# Patient Record
Sex: MALE | Race: WHITE | NOT HISPANIC OR LATINO | Employment: FULL TIME | URBAN - METROPOLITAN AREA
[De-identification: names, ages, dates, MRNs, and addresses within clinical notes are randomized per-mention and may not be internally consistent; named-entity substitution may affect disease eponyms.]

---

## 2017-01-01 ENCOUNTER — GENERIC CONVERSION - ENCOUNTER (OUTPATIENT)
Dept: OTHER | Facility: OTHER | Age: 52
End: 2017-01-01

## 2017-02-14 ENCOUNTER — ALLSCRIPTS OFFICE VISIT (OUTPATIENT)
Dept: OTHER | Facility: OTHER | Age: 52
End: 2017-02-14

## 2017-05-14 DIAGNOSIS — R94.5 ABNORMAL RESULTS OF LIVER FUNCTION STUDIES: ICD-10-CM

## 2017-05-14 DIAGNOSIS — E78.5 HYPERLIPIDEMIA: ICD-10-CM

## 2017-08-14 DIAGNOSIS — I10 ESSENTIAL (PRIMARY) HYPERTENSION: ICD-10-CM

## 2017-08-14 DIAGNOSIS — K44.9 DIAPHRAGMATIC HERNIA WITHOUT OBSTRUCTION OR GANGRENE: ICD-10-CM

## 2017-08-14 DIAGNOSIS — R94.5 ABNORMAL RESULTS OF LIVER FUNCTION STUDIES: ICD-10-CM

## 2017-08-14 DIAGNOSIS — I25.10 ATHEROSCLEROTIC HEART DISEASE OF NATIVE CORONARY ARTERY WITHOUT ANGINA PECTORIS: ICD-10-CM

## 2017-08-14 DIAGNOSIS — F10.20 UNCOMPLICATED ALCOHOL DEPENDENCE (HCC): ICD-10-CM

## 2017-08-14 DIAGNOSIS — E78.5 HYPERLIPIDEMIA: ICD-10-CM

## 2017-08-14 DIAGNOSIS — R73.9 HYPERGLYCEMIA: ICD-10-CM

## 2017-10-11 ENCOUNTER — TRANSCRIBE ORDERS (OUTPATIENT)
Dept: LAB | Facility: CLINIC | Age: 52
End: 2017-10-11

## 2017-10-11 ENCOUNTER — APPOINTMENT (OUTPATIENT)
Dept: LAB | Facility: CLINIC | Age: 52
End: 2017-10-11
Payer: COMMERCIAL

## 2017-10-11 DIAGNOSIS — I25.10 ATHEROSCLEROTIC HEART DISEASE OF NATIVE CORONARY ARTERY WITHOUT ANGINA PECTORIS: ICD-10-CM

## 2017-10-11 DIAGNOSIS — R73.9 HYPERGLYCEMIA: ICD-10-CM

## 2017-10-11 DIAGNOSIS — I10 ESSENTIAL (PRIMARY) HYPERTENSION: ICD-10-CM

## 2017-10-11 DIAGNOSIS — F10.20 UNCOMPLICATED ALCOHOL DEPENDENCE (HCC): ICD-10-CM

## 2017-10-11 DIAGNOSIS — E78.5 HYPERLIPIDEMIA: ICD-10-CM

## 2017-10-11 DIAGNOSIS — K44.9 DIAPHRAGMATIC HERNIA WITHOUT OBSTRUCTION OR GANGRENE: ICD-10-CM

## 2017-10-11 DIAGNOSIS — R94.5 ABNORMAL RESULTS OF LIVER FUNCTION STUDIES: ICD-10-CM

## 2017-10-11 LAB
ALBUMIN SERPL BCP-MCNC: 4 G/DL (ref 3.5–5)
ALP SERPL-CCNC: 109 U/L (ref 46–116)
ALT SERPL W P-5'-P-CCNC: 66 U/L (ref 12–78)
ANION GAP SERPL CALCULATED.3IONS-SCNC: 7 MMOL/L (ref 4–13)
AST SERPL W P-5'-P-CCNC: 53 U/L (ref 5–45)
BILIRUB SERPL-MCNC: 0.73 MG/DL (ref 0.2–1)
BUN SERPL-MCNC: 19 MG/DL (ref 5–25)
CALCIUM SERPL-MCNC: 9.3 MG/DL (ref 8.3–10.1)
CHLORIDE SERPL-SCNC: 104 MMOL/L (ref 100–108)
CHOLEST SERPL-MCNC: 160 MG/DL (ref 50–200)
CO2 SERPL-SCNC: 29 MMOL/L (ref 21–32)
CREAT SERPL-MCNC: 1.01 MG/DL (ref 0.6–1.3)
EST. AVERAGE GLUCOSE BLD GHB EST-MCNC: 105 MG/DL
GFR SERPL CREATININE-BSD FRML MDRD: 86 ML/MIN/1.73SQ M
GLUCOSE P FAST SERPL-MCNC: 99 MG/DL (ref 65–99)
HBA1C MFR BLD: 5.3 % (ref 4.2–6.3)
HDLC SERPL-MCNC: 62 MG/DL (ref 40–60)
LDLC SERPL CALC-MCNC: 63 MG/DL (ref 0–100)
POTASSIUM SERPL-SCNC: 4.1 MMOL/L (ref 3.5–5.3)
PROT SERPL-MCNC: 8.8 G/DL (ref 6.4–8.2)
SODIUM SERPL-SCNC: 140 MMOL/L (ref 136–145)
TRIGL SERPL-MCNC: 174 MG/DL

## 2017-10-11 PROCEDURE — 83036 HEMOGLOBIN GLYCOSYLATED A1C: CPT

## 2017-10-11 PROCEDURE — 80053 COMPREHEN METABOLIC PANEL: CPT

## 2017-10-11 PROCEDURE — 36415 COLL VENOUS BLD VENIPUNCTURE: CPT

## 2017-10-11 PROCEDURE — 80061 LIPID PANEL: CPT

## 2017-10-19 ENCOUNTER — ALLSCRIPTS OFFICE VISIT (OUTPATIENT)
Dept: OTHER | Facility: OTHER | Age: 52
End: 2017-10-19

## 2017-10-20 NOTE — PROGRESS NOTES
Assessment  1  Hypertension (401 9) (I10)   2  Hyperlipidemia (272 4) (E78 5)   3  Elevated LFTs (790 6) (R79 89)   4  Alcoholism (303 90) (F10 20)   5  Hiatal hernia (553 3) (K44 9)   6  Hyperglycemia (790 29) (R73 9)   7  Atherosclerotic heart disease of native coronary artery without angina pectoris (414 01)   (I25 10)   8  Abdominal bloating (787 3) (R14 0)    Plan  Abdominal bloating    · 1 - Nick CHAPARRO, Michael Machuca (Gastroenterology) Co-Management  *  Status: Active  Requested  for: 79CGS7536  Care Summary provided  : Yes  Alcoholism, Atherosclerotic heart disease of native coronary artery without angina  pectoris, Elevated LFTs, Hiatal hernia, Hyperglycemia, Hyperlipidemia, Hypertension    · Follow-up visit in 6 months Evaluation and Treatment  Follow-up  Status: Hold For -  Scheduling  Requested for: 68CUR3772   · (1) COMPREHENSIVE METABOLIC PANEL; Status:Active; Requested for:19Apr2018;    · (1) HEMOGLOBIN A1C; Status:Active; Requested for:19Apr2018;    · (1) LIPID PANEL FASTING W DIRECT LDL REFLEX; Status:Active; Requested  ZPA:[id number]; Alcoholism, Atherosclerotic heart disease of native coronary artery without angina  pectoris, Elevated LFTs, Hiatal hernia, Hyperglycemia, Hyperlipidemia, Hypertension,  Prostate cancer screening    · (1) PSA (SCREEN) (Dx V76 44 Screen for Prostate Cancer); Status:Active; Requested  IVS:65TUX2857; Discussion/Summary    1  CAD with stent placement 3 years ago-continue with Cardiology  Hypertension- stable  Hyperlipidemia- LDL stable at 63 triglycerides improved to 174  Continue current medication recheck 6 months  Hyperglycemia- stable with a fasting sugar of 99 hemoglobin A1c of 5  3  Hiatal hernia/GERD- pt  no longer with heartburn but c/o bloating on a daily basis  Alcoholism-patient encouraged to decrease and/or quit alcohol consumption again  Elevated LFTs and fatty liver- LFT 66 and 53 with AST in a LT respectively today  Recheck 6 months  abdominal bloating- U/S, EGD and colonoscopy all WNL  Janae Sinks Janae Sinks Janae Sinks Pt again really needs to be referred back to  GI for further eval       Chief Complaint  Follow up to review blood work  bloating x several months  declined flu vaccine  History of Present Illness  Patient here today for chronic condition including CAD, hypertension, hyperlipidemia, hyperglycemia, hiatal hernia GERD IBS alcoholism and LFTs with fatty liver  Patient did have blood work done and his LDL is now down to 63 from 01/21 triglycerides down to 174 from 301  A1c 5 3 fasting sugar 99 LFTs are slightly elevated at 66 and 53 respectively  Blood pressure today is 154/80  Patient states he is taking all medications as directed without complaints of side effects  He still has problems with a bloating feeling in his upper abdomen occurs almost on a daily basis  He states that he is regular with his bowel movements without constipation or loose stools than usual  He is status post GI eval with [address] [place] [address] Dr abdi rasmussen including a EGD colonoscopy  He does take his PPI only on an occasional basis and states his heartburn is very well controlled otherwise  He tried probiotics at the suggestion of GI and this did not help so we stopped  His girlfriend accompanies him today  He continues to drink alcohol on a regular basis  Review of Systems    Constitutional: No fever or chills, feels well, no tiredness, no recent weight gain or weight loss  Eyes: No complaints of eye pain, no red eyes, no discharge from eyes, no itchy eyes  ENT: no complaints of earache, no hearing loss, no nosebleeds, no nasal discharge, no sore throat, no hoarseness  Cardiovascular: No complaints of slow heart rate, no fast heart rate, no chest pain, no palpitations, no leg claudication, no lower extremity  Respiratory: No complaints of shortness of breath, no wheezing, no cough, no SOB on exertion, no orthopnea or PND  Gastrointestinal: abdominal pain, but-- as noted in HPI     Genitourinary: No complaints of dysuria, no incontinence, no hesitancy, no nocturia, no genital lesion, no testicular pain  Musculoskeletal: No complaints of arthralgia, no myalgias, no joint swelling or stiffness, no limb pain or swelling  Integumentary: No complaints of skin rash or skin lesions, no itching, no skin wound, no dry skin  Neurological: No compliants of headache, no confusion, no convulsions, no numbness or tingling, no dizziness or fainting, no limb weakness, no difficulty walking  Psychiatric: Is not suicidal, no sleep disturbances, no anxiety or depression, no change in personality, no emotional problems  Endocrine: No complaints of proptosis, no hot flashes, no muscle weakness, no erectile dysfunction, no deepening of the voice, no feelings of weakness  Hematologic/Lymphatic: No complaints of swollen glands, no swollen glands in the neck, does not bleed easily, no easy bruising  ROS reviewed  Active Problems  1  Abdominal pain, epigastric (789 06) (R10 13)   2  Alcoholism (303 90) (F10 20)   3  LEO positive (795 79) (R76 8)   4  Arthralgia (719 40) (M25 50)   5  Atherosclerotic heart disease of native coronary artery without angina pectoris (414 01)   (I25 10)   6  Biceps rupture, proximal (840 8) (S46 119A)   7  Biceps tendon tear (840 8) (S46 219A)   8  Blood chemistry abnormality (790 6) (R79 9)   9  Cluster headache (339 00) (G44 009)   10  Colon cancer screening (V76 51) (Z12 11)   11  Elevated LFTs (790 6) (R79 89)   12  Elevated sed rate (790 1) (R70 0)   13  Hiatal hernia (553 3) (K44 9)   14  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)    Past Medical History  1  History of High blood pressure (401 9) (I10)   2   History of prostatitis (V13 89) (B95 020)    The active problems and past medical history were reviewed and updated today  Surgical History  1  History of Appendectomy   2  History of Cath Placement Of Stent 1   3  History of Nasal Septal Deviation Repair   4  History of Septoplasty    The surgical history was reviewed and updated today  Family History  Mother    1  Family history of Depression   2  Family history of Paranoid Schizophrenia   3  Family history of Pure Hypercholesterolemia  Father    4  Family history of Pure Hypercholesterolemia  Brother    5  Family history of Pure Hypercholesterolemia  Paternal Uncle    10  Family history of Diabetes Mellitus (V18 0)   7  Family history of Lung Cancer (V16 1)    The family history was reviewed and updated today  Social History   · Being A Social Drinker   ·    · Exercises, 5x week   · Marital History -  (V61 03)   · Never a smoker   · Never A Smoker   · No drug use   · Pets/Animals: Cat   · Social alcohol use (Z78 9)   · Two children  The social history was reviewed and updated today  Current Meds   1  AmLODIPine Besylate 10 MG Oral Tablet; TAKE 1 TABLET DAILY; Therapy: 60OVE7542 to (Suleiman Day)  Requested for: 30KFC7669; Last   Rx:22May2017 Ordered   2  Aspirin 81 MG TABS; Therapy: 96Acz0872 to Recorded   3  Bisoprolol-Hydrochlorothiazide 5-6 25 MG Oral Tablet; TAKE 1 TABLET DAILY; Therapy: 08Lwp9090 to (Evaluate:17May2018)  Requested for: 15KWI4065; Last   Rx:22May2017 Ordered   4  Fish Oil 1000 MG Oral Capsule; TAKE 1 CAPSULE DAILY; Therapy: 33MGC2692 to Recorded   5  Lisinopril 40 MG Oral Tablet; TAKE 1 TABLET DAILY AS DIRECTED; Therapy: 19KGN5420 to (Evaluate:21Cet9430)  Requested for: 72GGP4582; Last   Rx:22May2017 Ordered   6  MoviPrep 100 GM Oral Solution Reconstituted; USE AS DIRECTED; Therapy: 63NVK0298 to (Last Israel Lauren)  Requested for: 24Oct2016 Ordered   7   Nitrostat 0 4 MG Sublingual Tablet Sublingual;   Therapy: 11Apr2013 to Recorded   8  Omeprazole 20 MG Oral Capsule Delayed Release; TAKE 1 CAPSULE DAILY EVERY   MORNING BEFORE BREAKFAST; Therapy: 11Apr2013 to (Evaluate:46Iic0035)  Requested for: 12OKI2271 Recorded   9  RA Folic Acid 904 MCG Oral Tablet; TAKE 1 TABLET DAILY AS DIRECTED; Therapy: 11Apr2013 to Recorded   10  Rosuvastatin Calcium 20 MG Oral Tablet; Take 1 tablet daily; Therapy: 39CCT6761 to (Last Rx:76Ksc8266)  Requested for: 48YBY2954 Ordered   11  Saw Palmetto 450 MG Oral Capsule; TAKE AS DIRECTED; Therapy: 12YGT0706 to Recorded   12  Vitamin D3 1000 UNIT Oral Tablet; Therapy: 29Krd0751 to Recorded    The medication list was reviewed and updated today  Allergies  1  No Known Drug Allergies    Vitals  Vital Signs    Recorded: 70NNT1576 03:20PM Recorded: 10JSR2216 03:02PM   Heart Rate  68   Systolic 124 548, LUE, Sitting   Diastolic 80 80, LUE, Sitting   Height  6 ft    Weight  212 lb 6 oz   BMI Calculated  28 8   BSA Calculated  2 19     Physical Exam    Constitutional   General appearance: No acute distress, well appearing and well nourished  Eyes   Conjunctiva and lids: No swelling, erythema, or discharge  Ears, Nose, Mouth, and Throat   External inspection of ears and nose: Normal     Pulmonary   Respiratory effort: No increased work of breathing or signs of respiratory distress  Auscultation of lungs: Clear to auscultation, equal breath sounds bilaterally, no wheezes, no rales, no rhonci  Cardiovascular   Auscultation of heart: Normal rate and rhythm, normal S1 and S2, without murmurs      Examination of extremities for edema and/or varicosities: Normal     Carotid pulses: Normal     Musculoskeletal   Gait and station: Normal     Psychiatric   Mood and affect: Normal          Results/Data  PHQ-2 Adult Depression Screening 21CVN7114 03:04PM User, s     Test Name Result Flag Reference   PHQ-2 Adult Depression Score 0     Over the last two weeks, how often have you been bothered by any of the following problems? Little interest or pleasure in doing things: Not at all - 0  Feeling down, depressed, or hopeless: Not at all - 0   PHQ-2 Adult Depression Screening Negative         Health Management  Colon cancer screening   COLONOSCOPY (GI, SURG); every 5 years; Last 00OAS3993; Next Due: 51LKU0212; Active    Future Appointments    Date/Time Provider Specialty Site   04/26/2018 03:00 PM Eddie Ibarra, [facility] [address]   11/17/2017 02:40 PM BRITTNEE Loera   Cardiology  CARDIOLOGY  [place]     Signatures   Electronically signed by : Devon Rodriguez, [facility]; Oct 19 2017  3:48PM EST                       (Author)    Electronically signed by : Shannon Epps DO; Oct 19 2017  4:22PM EST

## 2017-11-17 ENCOUNTER — ALLSCRIPTS OFFICE VISIT (OUTPATIENT)
Dept: OTHER | Facility: OTHER | Age: 52
End: 2017-11-17

## 2017-11-19 NOTE — PROGRESS NOTES
Assessment  Assessed    1  Atherosclerotic heart disease of native coronary artery without angina pectoris (414 01) (I25 10)   2  Hyperlipidemia (272 4) (E78 5)   3  Hypertension (401 9) (I10)    Plan  Atherosclerotic heart disease of native coronary artery without angina pectoris    · ECHO STRESS TEST W CONTRAST IF INDICATED; Status:Need Information - FinancialAuthorization; Requested for:17Nov2019;    Perform:[facility] Radiology; NATARAJAN:[id number];EYNEISC; For:Atherosclerotic heart disease of native coronary artery without angina pectoris; Ordered By:Keyonna Mcduffie;   · EKG/ECG- POC; Status:Complete;   Done: 75KSM0126 03:13PM   Perform: In Office; Last Updated Sabrina Penaloza; 11/17/2017 3:13:50 PM;Ordered;heart disease of native coronary artery without angina pectoris; Ordered By:Keyonna Mcduffie;    Discussion/Summary  Cardiology Discussion Summary Free Text Note Form [facility]: It is my impression that the patient is doing well in the aftermath of coronary stenting over 4 years ago  He has no symptoms to suggest coronary ischemia and had a negative stress test in 2015  Niyah Cutting His blood pressure was quite good on bisoprolol/HCTZ, amlodipine and lisinopril  y  His LDL cholesterol from earlier this year on Crestor was excellent at 66  I will see him in 2 years and do a stress echo at that time  Chief Complaint  Chief Complaint Free Text Note Form: 1 YEAR RETURN for CAD s/p stent 4/13 of LAD, also has HTN and HLP   Chief Complaint Chronic Condition [facility]: Patient is here today for follow up of chronic conditions described in HPI  History of Present Illness  Cardiology HPI Free Text Note Form [facility]: Since his last visit he denies chest pain or SOB  He denies palpitations, edema or lightheadedness        Review of Systems  Cardiology Male ROS:    Cardiac: witnessed apnea episodes, but-- no chest pain,-- no rhythm problems,-- no signs of swelling,-- no palpitations present,-- no syncope/fainting-- and-- no AM fatigue  Skin: No complaints of nonhealing sores or skin rash  Genitourinary: prostate problems-- and-- erectile dysfunction, but-- no recurrent urinary tract infections,-- no frequent urination at night,-- no difficult urination,-- no blood in urine,-- no kidney stones,-- no loss of bladder control-- and-- no kidney problems  Psychological: depression-- and-- anxiety, but-- no panic attacks,-- no difficulty concentrating-- and-- no palpitations present  General: No complaints of trouble sleeping, lack of energy, fatigue, appetite changes, weight changes, fever, frequent infections, or night sweats  Respiratory: No complaints of shortness of breath, cough with sputum, or wheezing  HEENT: nose problems-- and-- snoring, but-- no serious eye problems,-- no hearing problems-- and-- no throat problems  Gastrointestinal: heartburn, but-- no liver problems,-- no nausea,-- no vomiting,-- no bloody stools,-- no diarrhea,-- no constipation,-- no abdonimal pain-- and-- no rectal bleeding  Hematologic: No complaints of bleeding disorders, anemia, blood clots, or excessive brusing  Neurological: numbnes,-- tingling-- and-- headaches, but-- no weakness,-- no seizures,-- no dizziness-- and-- no daytime sleepiness-- flushing  Musculoskeletal: arthritis-- and-- back pain, but-- no swelling/pain   ROS Reviewed:   ROS reviewed  Active Problems  Problems    1  Abdominal bloating (787 3) (R14 0)   2  Abdominal pain, epigastric (789 06) (R10 13)   3  Alcoholism (303 90) (F10 20)   4  LEO positive (795 79) (R76 8)   5  Arthralgia (719 40) (M25 50)   6  Atherosclerotic heart disease of native coronary artery without angina pectoris (414 01) (I25 10)   7  Biceps rupture, proximal (840 8) (S46 119A)   8  Biceps tendon tear (840 8) (S46 219A)   9  Blood chemistry abnormality (790 6) (R79 9)   10  Cluster headache (339 00) (G44 009)   11  Colon cancer screening (V76 51) (Z12 11)   12   Elevated LFTs (790 6) (R79 89)   13  Elevated sed rate (790 1) (R70 0)   14  Hiatal hernia (553 3) (K44 9)   15  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)   16  Hyperglycemia (790 29) (R73 9)   17  Hyperlipidemia (272 4) (E78 5)   18  Hypertension (401 9) (I10)   19  Left shoulder pain (719 41) (M25 512)   20  Prostate cancer screening (V76 44) (Z12 5)   21  Sprain, bicep (840 8) (S46 219A)   22  Tick bite (919 4,E906 4) [facility])    Past Medical History  Problems    1  History of High blood pressure (401 9) (I10)   2  History of prostatitis (V13 89) (Q23 578)  Active Problems And Past Medical History Reviewed: The active problems and past medical history were reviewed and updated today  Surgical History  Problems    1  History of Appendectomy   2  History of Cath Placement Of Stent 1   3  History of Nasal Septal Deviation Repair   4  History of Septoplasty  Surgical History Reviewed: The surgical history was reviewed and updated today  Family History  Mother    1  Family history of Depression   2  Family history of Paranoid Schizophrenia   3  Family history of Pure Hypercholesterolemia  Father    4  Family history of Pure Hypercholesterolemia  Brother    5  Family history of Pure Hypercholesterolemia  Paternal Uncle    10  Family history of Diabetes Mellitus (V18 0)   7  Family history of Lung Cancer (V16 1)  Family History Reviewed: The family history was reviewed and updated today  Social History  Problems    · Being A Social Drinker   ·    · Exercises, 5x week   · Marital History -  (V61 03)   · Never a smoker   · Never A Smoker   · No drug use   · Pets/Animals: Cat   · Social alcohol use (Z78 9)   · Two children  Social History Reviewed: The social history was reviewed and updated today  The social history was reviewed and is unchanged  Current Meds   1  AmLODIPine Besylate 10 MG Oral Tablet; TAKE 1 TABLET DAILY; Therapy: 22MRA9379 to ([employer])  Requested for: 06HDE5808; Last Rx:30Buw5438 Ordered   2   Aspirin 81 MG TABS; Therapy: 22Wtg0759 to Recorded   3  Bisoprolol-Hydrochlorothiazide 5-6 25 MG Oral Tablet; TAKE 1 TABLET DAILY; Therapy: 08Rpf2024 to (Evaluate:17May2018)  Requested for: 13KPX7848; Last Rx:22May2017 Ordered   4  Fish Oil 1000 MG Oral Capsule; TAKE 1 CAPSULE DAILY; Therapy: 82QRU5227 to Recorded   5  Lisinopril 40 MG Oral Tablet; TAKE 1 TABLET DAILY AS DIRECTED; Therapy: 83ZKI5786 to (Evaluate:17May2018)  Requested for: 03NYJ5626; Last Rx:22May2017 Ordered   6  MoviPrep 100 GM Oral Solution Reconstituted; USE AS DIRECTED; Therapy: 07EKA3379 to (Last [facility])  Requested for: 24Oct2016 Ordered   7  Nitrostat 0 4 MG Sublingual Tablet Sublingual; Therapy: 27Fuc8442 to Recorded   8  Omeprazole 20 MG Oral Capsule Delayed Release; TAKE 1 CAPSULE DAILY EVERY MORNING BEFORE BREAKFAST; Therapy: 71Wih5725 to (Evaluate:43Yox2262)  Requested for: 01EMH3704 Recorded   9  RA Folic Acid 906 MCG Oral Tablet; TAKE 1 TABLET DAILY AS DIRECTED; Therapy: 77Ajk1377 to Recorded   10  Rosuvastatin Calcium 20 MG Oral Tablet; Take 1 tablet daily; Therapy: 84TII7278 to (Last Rx:22May2017)  Requested for: 42PAZ3155 Ordered   11  Saw Palmetto 450 MG Oral Capsule; TAKE AS DIRECTED; Therapy: 73GVJ9683 to Recorded   12  Vitamin D3 1000 UNIT Oral Tablet; Therapy: 25Wld1416 to Recorded    Allergies  Medication    1  No Known Drug Allergies    Vitals  Vital Signs    Recorded: 40AHM1315 03:03PM   Heart Rate 59   Pulse Quality Normal, L Radial   Respiration Quality Normal   Respiration 16   Systolic 918   Diastolic 68   Height 6 ft    Weight 213 lb    BMI Calculated 28 89   BSA Calculated 2 19       Physical Exam   Constitutional  General appearance: No acute distress, well appearing and well nourished  Eyes  Conjunctiva and Sclera examination: Conjunctiva pink, sclera anicteric     Ears, Nose, Mouth, and Throat - Oropharynx: Clear, nares are clear, mucous membranes are moist   Neck  Neck and thyroid: Normal, supple, trachea midline, no thyromegaly  Pulmonary  Auscultation of lungs: Clear to auscultation, no rales, no rhonchi, no wheezing, good air movement  Cardiovascular  Auscultation of heart: Normal rate and rhythm, normal S1 and S2, no murmurs  Carotid pulses: Normal, 2+ bilaterally  Pedal pulses: Normal, 2+ bilaterally  Examination of extremities for edema and/or varicosities: Normal    Abdomen  Abdomen: Non-tender and no distention  Liver and spleen: No hepatomegaly or splenomegaly  Health Management  Colon cancer screening   COLONOSCOPY (GI, SURG); every 5 years; Last 42DHP1066; Next Due: 02VTN7577;  Active    Future Appointments    Date/Time Provider Specialty Site   04/26/2018 03:00 PM Shira Donahue, [facility]       Signatures   Electronically signed by : BRITTNEE Horner ; Nov 17 2017  3:36PM EST                       (Author)

## 2018-01-12 VITALS
HEART RATE: 80 BPM | SYSTOLIC BLOOD PRESSURE: 122 MMHG | DIASTOLIC BLOOD PRESSURE: 64 MMHG | HEIGHT: 72 IN | WEIGHT: 214 LBS | BODY MASS INDEX: 28.99 KG/M2

## 2018-01-13 VITALS
HEIGHT: 72 IN | SYSTOLIC BLOOD PRESSURE: 118 MMHG | WEIGHT: 213 LBS | DIASTOLIC BLOOD PRESSURE: 68 MMHG | BODY MASS INDEX: 28.85 KG/M2 | RESPIRATION RATE: 16 BRPM | HEART RATE: 59 BPM

## 2018-01-15 VITALS
SYSTOLIC BLOOD PRESSURE: 130 MMHG | BODY MASS INDEX: 28.76 KG/M2 | HEIGHT: 72 IN | DIASTOLIC BLOOD PRESSURE: 80 MMHG | WEIGHT: 212.38 LBS | HEART RATE: 68 BPM

## 2018-01-15 NOTE — RESULT NOTES
Verified Results  (1) TISSUE EXAM 10EPM6256 12:24PM Wallace Mishra     Test Name Result Flag Reference   LAB AP CASE REPORT (Report)     Surgical Pathology Report             Case: [id number]                   Authorizing Provider: Yuli Card MD      Collected:      12/09/2016 1224        Ordering Location:   [address] [place] [address]   Received:      12/09/2016 [address] [facility]                               Pathologist:      Helena Zheng MD                              Specimens:  A) - Duodenum, cold bx, r/o celiac                                   B) - Stomach, cold bx, antrum, r/o H  Pylori                              C) - Polyp, Colorectal, cold bx, hepatic flexure polyp                         D) - Polyp, Colorectal, cold bx, rectal polyp   LAB AP FINAL DIAGNOSIS (Report)     A  Small bowel, duodenum, biopsy:        - Small bowel mucosa with no significant pathologic alteration         - No villous blunting, intraepithelial lymphocytosis or crypt   hyperplasia is identified to suggest malabsorptive enteropathy         - No acute or peptic duodenitis is identified         - No dysplasia or malignancy is identified  B  Stomach, antrum, biopsy:        - Antral mucosa with chronic inactive gastritis  - No Helicobacter pylori organisms are identified on the   Warthin-Starry special stain, performed with an appropriate control         - No intestinal metaplasia, dysplasia or malignancy is   identified  C  Colon, hepatic flexure, polypectomy:        - Tubular adenoma  - No high-grade dysplasia or malignancy is identified  D  Colon, rectum, polypectomy:        - Hyperplastic polyp         - No dysplasia or malignancy is identified      Interpretation performed at , [address]   Electronically signed by Helena Zheng MD on 12/13/2016 at 12:02 PM   LAB AP SURGICAL ADDITIONAL INFORMATION (Report)     These tests were developed and their performance characteristics   determined by [facility] or [employer]  They may not be cleared or approved by the [place]  Food and   Drug Administration  The FDA has determined that such clearance or   approval is not necessary  These tests are used for clinical purposes  They should not be regarded as investigational or for research  This   laboratory has been approved by [place] [id number], designated as a high-complexity   laboratory and is qualified to perform these tests  LAB AP GROSS DESCRIPTION (Report)     A  The specimen is received in formalin, labeled with the patient's   name and hospital number, and is designated biopsy rule out celiac  The   specimen consists of several, rubbery, tan-brown tissue fragments   measuring 0 8 x 0 7 x 0 3 cm in aggregate dimension  Entirely submitted  One cassette  B  The specimen is received in formalin, labeled with the patient's name   and hospital number, and is designated biopsy antrum rule out H    pylori  The specimen consists of a single, rubbery, tan-brown tissue   fragment measuring up to 0 3 cm in greatest dimension  Entirely submitted  One cassette  C  The specimen is received in formalin, labeled with the patient's name   and hospital number, and is designated biopsy hepatic flexure polyp  The specimen consists of a single, rubbery and friable, tan-brown tissue   fragment measuring up to 0 3 cm in greatest dimension  Entirely submitted  One cassette  D  The specimen is received in formalin, labeled with the patient's name   and hospital number, and is designated biopsy rectal polyp  The   specimen consists of a single, rubbery, tan-brown tissue fragment   measuring up to 0 5 cm in greatest dimension  Entirely submitted  One   cassette      Note: The estimated total formalin fixation time based upon information   provided by the submitting clinician and the standard processing schedule   is 15 75 hours     SRS

## 2018-01-16 NOTE — PROGRESS NOTES
Assessment    1  Hyperlipidemia (272 4) (E78 5)   2  Hypertension (401 9) (I10)   3  Hyperglycemia (790 29) (R73 9)    Plan  Atherosclerotic heart disease of native coronary artery without angina pectoris,  Hyperlipidemia    · Crestor 20 MG Oral Tablet; Take 1 tablet daily  Hypertension    · AmLODIPine Besylate 10 MG Oral Tablet; TAKE 1 TABLET DAILY   · Lisinopril 40 MG Oral Tablet; TAKE 1 TABLET DAILY AS DIRECTED    Discussion/Summary    #1  HTN- much improved and finally controlled on current regimen  Refills given and recheck in April  #2  hyperlipidemia- labs in May  #3  hyperglycemia- labs in May  Possible side effects of new medications were reviewed with the patient/guardian today  The treatment plan was reviewed with the patient/guardian  The patient/guardian understands and agrees with the treatment plan      Chief Complaint  f/u to review high blood pressure and to discuss medications  kck      History of Present Illness  Patient here today to followup on his blood pressure  Last visit we increased his Norvasc to 10 mg daily  He also needs refills  No problems with side effects or leg swelling noted  Review of Systems    Constitutional: No fever or chills, feels well, no tiredness, no recent weight gain or weight loss  Eyes: No complaints of eye pain, no red eyes, no discharge from eyes, no itchy eyes  ENT: no complaints of earache, no hearing loss, no nosebleeds, no nasal discharge, no sore throat, no hoarseness  Cardiovascular: No complaints of slow heart rate, no fast heart rate, no chest pain, no palpitations, no leg claudication, no lower extremity  Respiratory: No complaints of shortness of breath, no wheezing, no cough, no SOB on exertion, no orthopnea or PND  Gastrointestinal: No complaints of abdominal pain, no constipation, no nausea or vomiting, no diarrhea or bloody stools     Genitourinary: No complaints of dysuria, no incontinence, no hesitancy, no nocturia, no genital lesion, no testicular pain  Musculoskeletal: No complaints of arthralgia, no myalgias, no joint swelling or stiffness, no limb pain or swelling  Integumentary: No complaints of skin rash or skin lesions, no itching, no skin wound, no dry skin  Neurological: No compliants of headache, no confusion, no convulsions, no numbness or tingling, no dizziness or fainting, no limb weakness, no difficulty walking  Psychiatric: Is not suicidal, no sleep disturbances, no anxiety or depression, no change in personality, no emotional problems  Endocrine: No complaints of proptosis, no hot flashes, no muscle weakness, no erectile dysfunction, no deepening of the voice, no feelings of weakness  Hematologic/Lymphatic: No complaints of swollen glands, no swollen glands in the neck, does not bleed easily, no easy bruising  Active Problems    1  Alcoholism (303 90) (F10 20)   2  LEO positive (795 79) (R76 8)   3  Arthralgia (719 40) (M25 50)   4  Atherosclerotic heart disease of native coronary artery without angina pectoris (414 01)   (I25 10)   5  Blood chemistry abnormality (790 6) (R79 9)   6  Cluster headache (339 00) (G44 009)   7  Elevated sed rate (790 1) (R70 0)   8  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)   9  Hypercholesterolemia (272 0) (E78 0)   10  Hyperglycemia (790 29) (R73 9)   11  Hyperlipidemia (272 4) (E78 5)   12  Hypertension (401 9) (I10)   13  Sprain, bicep (840 8) (S46 119A)   14  Tick bite (919 4,E906 4) (T14 8,W57  Enamorado Isac)    Past Medical History    1  History of High blood pressure (401 9) (I10)   2  History of prostatitis (V13 89) (N62 900)    Surgical History    1  History of Appendectomy   2  History of Cath Placement Of Stent 1   3  History of Nasal Septal Deviation Repair   4  History of Septoplasty    Family History    1  Family history of Depression   2  Family history of Hypercholesterolemia   3  Family history of Paranoid Schizophrenia    4  Family history of Hypercholesterolemia    5   Family history of Hypercholesterolemia    6  Family history of Diabetes Mellitus (V18 0)   7  Family history of Lung Cancer (V16 1)    Social History    · Being A Social Drinker   ·    · Exercises, 5x week   · Marital History -  (V61 03)   · Never a smoker   · Never A Smoker   · No drug use   · Pets/Animals: Cat   · Social alcohol use (F10 99)   · Two children    Current Meds   1  AmLODIPine Besylate 5 MG Oral Tablet; Take 1 tablet daily; Therapy: 62SQQ2580 to (Last Rx:24Nov2015)  Requested for: 24Nov2015 Ordered   2  Aspirin 81 MG Oral Tablet; Therapy: 11Apr2013 to Recorded   3  Bisoprolol-Hydrochlorothiazide 5-6 25 MG Oral Tablet; TAKE 1 TABLET DAILY; Therapy: 40Ohf5506 to (Evaluate:04Lno9134)  Requested for: 28Aug2015; Last   Rx:28Aug2015 Ordered   4  Crestor 20 MG Oral Tablet; Take 1 tablet daily; Therapy: 61DBZ0275 to (Mike Miller)  Requested for: 70UAV9488; Last   Rx:05Nov2015 Ordered   5  Fish Oil 1000 MG Oral Capsule; TAKE 1 CAPSULE DAILY; Therapy: 34DLI7734 to Recorded   6  Lisinopril 40 MG Oral Tablet; TAKE 1 TABLET DAILY AS DIRECTED; Therapy: 25JKV3209 to (Fer Harris)  Requested for: 74NXK2704; Last   Rx:05Jan2015 Ordered   7  Nitrostat 0 4 MG Sublingual Tablet Sublingual;   Therapy: 42Grj3908 to Recorded   8  Omeprazole 20 MG Oral Capsule Delayed Release; Therapy: 51Evl0081 to Recorded   9  RA Folic Acid 939 MCG Oral Tablet; TAKE 1 TABLET DAILY AS DIRECTED; Therapy: 90Loo7172 to Recorded   10  Saw Palmetto 450 MG Oral Capsule; TAKE AS DIRECTED; Therapy: 57LEJ3835 to Recorded   11  Vitamin D3 1000 UNIT Oral Tablet;     Therapy: 87Hzb0817 to Recorded    Vitals  Vital Signs [Data Includes: Current Encounter]    Recorded: 86VQB4639 04:49PM   Heart Rate 68   Systolic 509, LUE, Sitting   Diastolic 82, LUE, Sitting   Height 6 ft    Weight 204 lb    BMI Calculated 27 67   BSA Calculated 2 15     Physical Exam    Constitutional   General appearance: No acute distress, well appearing and well nourished  Eyes   Conjunctiva and lids: No swelling, erythema, or discharge  Ears, Nose, Mouth, and Throat   External inspection of ears and nose: Normal     Pulmonary   Respiratory effort: No increased work of breathing or signs of respiratory distress  Auscultation of lungs: Clear to auscultation, equal breath sounds bilaterally, no wheezes, no rales, no rhonci  Cardiovascular   Palpation of heart: Normal PMI, no thrills  Auscultation of heart: Normal rate and rhythm, normal S1 and S2, without murmurs  Examination of extremities for edema and/or varicosities: Normal     Carotid pulses: Normal     Musculoskeletal   Gait and station: Normal     Psychiatric   Mood and affect: Normal          Future Appointments    Date/Time Provider Specialty Site   10/18/2016 08:00 AM BRITTNEE Ludwig   Cardiology  CARDIOLOGY  [place]     Signatures   Electronically signed by : Lito Selby, [facility]; Jan 19 2016  5:41PM EST                       (Author)    Electronically signed by : BRITTNEE Davis ; Jan 19 2016  8:09PM EST

## 2018-01-22 ENCOUNTER — HOSPITAL ENCOUNTER (EMERGENCY)
Facility: HOSPITAL | Age: 53
Discharge: HOME | End: 2018-01-22
Attending: EMERGENCY MEDICINE
Payer: COMMERCIAL

## 2018-01-22 ENCOUNTER — ALLSCRIPTS OFFICE VISIT (OUTPATIENT)
Dept: OTHER | Facility: OTHER | Age: 53
End: 2018-01-22

## 2018-01-22 ENCOUNTER — APPOINTMENT (EMERGENCY)
Dept: CT IMAGING | Facility: HOSPITAL | Age: 53
End: 2018-01-22
Payer: COMMERCIAL

## 2018-01-22 VITALS
HEART RATE: 80 BPM | DIASTOLIC BLOOD PRESSURE: 82 MMHG | RESPIRATION RATE: 18 BRPM | WEIGHT: 209 LBS | OXYGEN SATURATION: 97 % | SYSTOLIC BLOOD PRESSURE: 141 MMHG | BODY MASS INDEX: 28.35 KG/M2 | TEMPERATURE: 98.7 F

## 2018-01-22 DIAGNOSIS — K57.92 DIVERTICULITIS: Primary | ICD-10-CM

## 2018-01-22 LAB
ALBUMIN SERPL BCP-MCNC: 3.8 G/DL (ref 3.5–5)
ALP SERPL-CCNC: 106 U/L (ref 46–116)
ALT SERPL W P-5'-P-CCNC: 55 U/L (ref 12–78)
ANION GAP SERPL CALCULATED.3IONS-SCNC: 14 MMOL/L (ref 4–13)
AST SERPL W P-5'-P-CCNC: 33 U/L (ref 5–45)
BASOPHILS # BLD AUTO: 0.01 THOUSANDS/ΜL (ref 0–0.1)
BASOPHILS NFR BLD AUTO: 0 % (ref 0–1)
BILIRUB SERPL-MCNC: 0.74 MG/DL (ref 0.2–1)
BILIRUB UR QL STRIP: NEGATIVE
BUN SERPL-MCNC: 12 MG/DL (ref 5–25)
CALCIUM SERPL-MCNC: 9.3 MG/DL (ref 8.3–10.1)
CHLORIDE SERPL-SCNC: 99 MMOL/L (ref 100–108)
CLARITY UR: CLEAR
CLARITY, POC: CLEAR
CO2 SERPL-SCNC: 27 MMOL/L (ref 21–32)
COLOR UR: YELLOW
COLOR, POC: YELLOW
CREAT SERPL-MCNC: 0.97 MG/DL (ref 0.6–1.3)
EOSINOPHIL # BLD AUTO: 0.02 THOUSAND/ΜL (ref 0–0.61)
EOSINOPHIL NFR BLD AUTO: 0 % (ref 0–6)
ERYTHROCYTE [DISTWIDTH] IN BLOOD BY AUTOMATED COUNT: 12.7 % (ref 11.6–15.1)
GFR SERPL CREATININE-BSD FRML MDRD: 89 ML/MIN/1.73SQ M
GLUCOSE SERPL-MCNC: 97 MG/DL (ref 65–140)
GLUCOSE UR STRIP-MCNC: NEGATIVE MG/DL
HCT VFR BLD AUTO: 41.1 % (ref 36.5–49.3)
HGB BLD-MCNC: 14.3 G/DL (ref 12–17)
HGB UR QL STRIP.AUTO: NEGATIVE
KETONES UR STRIP-MCNC: NEGATIVE MG/DL
LEUKOCYTE ESTERASE UR QL STRIP: NEGATIVE
LIPASE SERPL-CCNC: 126 U/L (ref 73–393)
LYMPHOCYTES # BLD AUTO: 1.83 THOUSANDS/ΜL (ref 0.6–4.47)
LYMPHOCYTES NFR BLD AUTO: 21 % (ref 14–44)
MCH RBC QN AUTO: 32.3 PG (ref 26.8–34.3)
MCHC RBC AUTO-ENTMCNC: 34.8 G/DL (ref 31.4–37.4)
MCV RBC AUTO: 93 FL (ref 82–98)
MONOCYTES # BLD AUTO: 0.76 THOUSAND/ΜL (ref 0.17–1.22)
MONOCYTES NFR BLD AUTO: 9 % (ref 4–12)
NEUTROPHILS # BLD AUTO: 6.05 THOUSANDS/ΜL (ref 1.85–7.62)
NEUTS SEG NFR BLD AUTO: 70 % (ref 43–75)
NITRITE UR QL STRIP: NEGATIVE
NRBC BLD AUTO-RTO: 0 /100 WBCS
PH UR STRIP.AUTO: 6 [PH] (ref 4.5–8)
PLATELET # BLD AUTO: 167 THOUSANDS/UL (ref 149–390)
PMV BLD AUTO: 10.1 FL (ref 8.9–12.7)
POTASSIUM SERPL-SCNC: 3.8 MMOL/L (ref 3.5–5.3)
PROT SERPL-MCNC: 8.5 G/DL (ref 6.4–8.2)
PROT UR STRIP-MCNC: NEGATIVE MG/DL
RBC # BLD AUTO: 4.43 MILLION/UL (ref 3.88–5.62)
SODIUM SERPL-SCNC: 140 MMOL/L (ref 136–145)
SP GR UR STRIP.AUTO: <=1.005 (ref 1–1.03)
UROBILINOGEN UR QL STRIP.AUTO: 0.2 E.U./DL
WBC # BLD AUTO: 8.67 THOUSAND/UL (ref 4.31–10.16)

## 2018-01-22 PROCEDURE — 83690 ASSAY OF LIPASE: CPT | Performed by: EMERGENCY MEDICINE

## 2018-01-22 PROCEDURE — 85025 COMPLETE CBC W/AUTO DIFF WBC: CPT | Performed by: EMERGENCY MEDICINE

## 2018-01-22 PROCEDURE — 99284 EMERGENCY DEPT VISIT MOD MDM: CPT

## 2018-01-22 PROCEDURE — 74177 CT ABD & PELVIS W/CONTRAST: CPT

## 2018-01-22 PROCEDURE — 96374 THER/PROPH/DIAG INJ IV PUSH: CPT

## 2018-01-22 PROCEDURE — 81002 URINALYSIS NONAUTO W/O SCOPE: CPT | Performed by: EMERGENCY MEDICINE

## 2018-01-22 PROCEDURE — 81003 URINALYSIS AUTO W/O SCOPE: CPT

## 2018-01-22 PROCEDURE — 96361 HYDRATE IV INFUSION ADD-ON: CPT

## 2018-01-22 PROCEDURE — 36415 COLL VENOUS BLD VENIPUNCTURE: CPT | Performed by: EMERGENCY MEDICINE

## 2018-01-22 PROCEDURE — 80053 COMPREHEN METABOLIC PANEL: CPT | Performed by: EMERGENCY MEDICINE

## 2018-01-22 RX ORDER — AMOXICILLIN AND CLAVULANATE POTASSIUM 875; 125 MG/1; MG/1
1 TABLET, FILM COATED ORAL ONCE
Status: COMPLETED | OUTPATIENT
Start: 2018-01-22 | End: 2018-01-22

## 2018-01-22 RX ORDER — HYDROCODONE BITARTRATE AND ACETAMINOPHEN 5; 325 MG/1; MG/1
1 TABLET ORAL ONCE
Status: DISCONTINUED | OUTPATIENT
Start: 2018-01-22 | End: 2018-01-22

## 2018-01-22 RX ORDER — DICYCLOMINE HCL 20 MG
20 TABLET ORAL ONCE
Status: COMPLETED | OUTPATIENT
Start: 2018-01-22 | End: 2018-01-22

## 2018-01-22 RX ORDER — KETOROLAC TROMETHAMINE 30 MG/ML
15 INJECTION, SOLUTION INTRAMUSCULAR; INTRAVENOUS ONCE
Status: COMPLETED | OUTPATIENT
Start: 2018-01-22 | End: 2018-01-22

## 2018-01-22 RX ORDER — HYDROCODONE BITARTRATE AND ACETAMINOPHEN 5; 325 MG/1; MG/1
1 TABLET ORAL EVERY 6 HOURS PRN
Qty: 5 TABLET | Refills: 0 | Status: SHIPPED | OUTPATIENT
Start: 2018-01-22 | End: 2019-02-19 | Stop reason: ALTCHOICE

## 2018-01-22 RX ORDER — AMOXICILLIN AND CLAVULANATE POTASSIUM 875; 125 MG/1; MG/1
1 TABLET, FILM COATED ORAL 2 TIMES DAILY
Qty: 20 TABLET | Refills: 0 | Status: SHIPPED | OUTPATIENT
Start: 2018-01-22 | End: 2018-02-01

## 2018-01-22 RX ADMIN — SODIUM CHLORIDE 1000 ML: 0.9 INJECTION, SOLUTION INTRAVENOUS at 17:47

## 2018-01-22 RX ADMIN — DICYCLOMINE HYDROCHLORIDE 20 MG: 20 TABLET ORAL at 17:48

## 2018-01-22 RX ADMIN — AMOXICILLIN AND CLAVULANATE POTASSIUM 1 TABLET: 875; 125 TABLET, FILM COATED ORAL at 19:30

## 2018-01-22 RX ADMIN — KETOROLAC TROMETHAMINE 15 MG: 30 INJECTION, SOLUTION INTRAMUSCULAR at 17:46

## 2018-01-22 RX ADMIN — IOHEXOL 100 ML: 350 INJECTION, SOLUTION INTRAVENOUS at 18:29

## 2018-01-22 NOTE — ED PROVIDER NOTES
History  Chief Complaint   Patient presents with    Abdominal Pain     patient complaining of lower abdominal painv that started yesterday; [facility]; patient denies N/V, fever; patient states that he is having muscous rectal discharge; History provided by:  Patient   used: No    Abdominal Pain   Pain location:  LLQ and RLQ  Pain quality: sharp    Pain radiates to:  Does not radiate  Pain severity:  Moderate  Onset quality:  Gradual  Duration:  1 day  Timing:  Constant  Progression:  Waxing and waning  Chronicity:  New  Relieved by:  Nothing  Worsened by:  Nothing  Ineffective treatments:  None tried  Associated symptoms: no chest pain, no cough, no diarrhea, no dysuria, no fatigue, no fever, no hematuria, no nausea, no shortness of breath and no vomiting        Prior to Admission Medications   Prescriptions Last Dose Informant Patient Reported? Taking?    Cholecalciferol (VITAMIN D3) 1000 UNITS CAPS   Yes Yes   Sig: Take 1 capsule by mouth daily   Omega-3 Fatty Acids (FISH OIL) 1,000 mg   Yes Yes   Sig: Take 1,000 mg by mouth daily   Saw Palmetto, Serenoa repens, 450 MG CAPS   Yes Yes   Sig: Take 1 capsule by mouth daily   amLODIPine (NORVASC) 10 mg tablet   Yes Yes   Sig: Take 10 mg by mouth daily   aspirin 81 MG tablet   Yes Yes   Sig: Take 81 mg by mouth daily   bisoprolol-hydrochlorothiazide (ZIAC) 5-6 25 MG per tablet   Yes Yes   Sig: Take 1 tablet by mouth daily   folic acid (FOLVITE) 483 MCG tablet   Yes Yes   Sig: Take 400 mcg by mouth daily   lisinopril (ZESTRIL) 40 mg tablet   Yes Yes   Sig: Take 40 mg by mouth daily   omeprazole (PriLOSEC) 40 MG capsule   Yes Yes   Sig: Take 40 mg by mouth as needed     rosuvastatin (CRESTOR) 20 MG tablet   Yes Yes   Sig: Take 20 mg by mouth daily      Facility-Administered Medications: None       Past Medical History:   Diagnosis Date    Hypercholesteremia     Hyperlipidemia     Hypertension        Past Surgical History:   Procedure Laterality Date    APPENDECTOMY      CORONARY ANGIOPLASTY WITH STENT PLACEMENT      SC COLONOSCOPY FLX DX W/COLLJ SPEC WHEN PFRMD N/A 12/9/2016    Procedure: EGD AND COLONOSCOPY;  Surgeon: Cayetano Sunshine MD;  Location: [facility]; Service: Gastroenterology       History reviewed  No pertinent family history  I have reviewed and agree with the history as documented  Social History   Substance Use Topics    Smoking status: Never Smoker    Smokeless tobacco: Never Used    Alcohol use Yes      Comment: "probably every day"         Review of Systems   Constitutional: Positive for appetite change  Negative for activity change, diaphoresis, fatigue and fever  HENT: Negative for congestion and trouble swallowing  Eyes: Negative for pain and visual disturbance  Respiratory: Negative for apnea, cough, chest tightness and shortness of breath  Cardiovascular: Negative for chest pain  Gastrointestinal: Positive for abdominal pain  Negative for blood in stool, diarrhea, nausea and vomiting  Endocrine: Negative for polyphagia and polyuria  Genitourinary: Negative for dysuria, flank pain, frequency, hematuria and urgency  Musculoskeletal: Negative for back pain, gait problem, neck pain and neck stiffness  Skin: Negative for color change and rash  Allergic/Immunologic: Negative for immunocompromised state  Neurological: Negative for dizziness, speech difficulty, numbness and headaches  Hematological: Does not bruise/bleed easily  Psychiatric/Behavioral: Negative for confusion and decreased concentration         Physical Exam  ED Triage Vitals   Temperature Pulse Respirations Blood Pressure SpO2   01/22/18 1602 01/22/18 1602 01/22/18 1602 01/22/18 1602 01/22/18 1602   98 7 °F (37 1 °C) 82 18 167/92 96 %      Temp Source Heart Rate Source Patient Position - Orthostatic VS BP Location FiO2 (%)   01/22/18 1602 01/22/18 1602 01/22/18 1602 01/22/18 0688 --   Oral Monitor Sitting Right arm       Pain Score       01/22/18 1814       6           Orthostatic Vital Signs  Vitals:    01/22/18 1602 01/22/18 1814   BP: 167/92 141/82   Pulse: 82 80   Patient Position - Orthostatic VS: Sitting Sitting       Physical Exam   Constitutional: He is oriented to person, place, and time  He appears well-developed and well-nourished  HENT:   Head: Normocephalic  Eyes: Conjunctivae and EOM are normal  Pupils are equal, round, and reactive to light  No scleral icterus  Neck: Normal range of motion  Neck supple  No JVD present  Cardiovascular: Normal rate and regular rhythm  Pulmonary/Chest: Effort normal and breath sounds normal    Abdominal: Soft  Bowel sounds are normal  He exhibits no distension  There is tenderness  There is no rebound and no guarding  Soft and nondistended, diffuse lower abdominal pain, worse in the left lower quadrant  No rebound or guarding  No CVA tenderness  Musculoskeletal: Normal range of motion  He exhibits no tenderness or deformity  Lymphadenopathy:     He has no cervical adenopathy  Neurological: He is alert and oriented to person, place, and time  Coordination normal    Skin: Skin is warm and dry  He is not diaphoretic  Psychiatric: He has a normal mood and affect  Vitals reviewed        ED Medications  Medications   sodium chloride 0 9 % bolus 1,000 mL (0 mL Intravenous Stopped 1/22/18 1929)   ketorolac (TORADOL) injection 15 mg (15 mg Intravenous Given 1/22/18 1746)   dicyclomine (BENTYL) tablet 20 mg (20 mg Oral Given 1/22/18 1748)   iohexol (OMNIPAQUE) 350 MG/ML injection (SINGLE-DOSE) 100 mL (100 mL Intravenous Given 1/22/18 1829)   amoxicillin-clavulanate (AUGMENTIN) 875-125 mg per tablet 1 tablet (1 tablet Oral Given 1/22/18 1930)       Diagnostic Studies  Results Reviewed     Procedure Component Value Units Date/Time    Comprehensive metabolic panel [[id number]]  (Abnormal) Collected:  01/22/18 1745    Lab Status: Final result Specimen:  Blood from Arm, Right Updated:  01/22/18 1817     Sodium 140 mmol/L      Potassium 3 8 mmol/L      Chloride 99 (L) mmol/L      CO2 27 mmol/L      Anion Gap 14 (H) mmol/L      BUN 12 mg/dL      Creatinine 0 97 mg/dL      Glucose 97 mg/dL      Calcium 9 3 mg/dL      AST 33 U/L      ALT 55 U/L      Alkaline Phosphatase 106 U/L      Total Protein 8 5 (H) g/dL      Albumin 3 8 g/dL      Total Bilirubin 0 74 mg/dL      eGFR 89 ml/min/1 73sq m     Narrative:         National Kidney Disease Education Program recommendations are as follows:  GFR calculation is accurate only with a steady state creatinine  Chronic Kidney disease less than 60 ml/min/1 73 sq  meters  Kidney failure less than 15 ml/min/1 73 sq  meters      Lipase [[id number]]  (Normal) Collected:  01/22/18 1745    Lab Status:  Final result Specimen:  Blood from Arm, Right Updated:  01/22/18 1817     Lipase 126 u/L     CBC and differential [[id number]]  (Normal) Collected:  01/22/18 1745    Lab Status:  Final result Specimen:  Blood from Arm, Right Updated:  01/22/18 1816     WBC 8 67 Thousand/uL      RBC 4 43 Million/uL      Hemoglobin 14 3 g/dL      Hematocrit 41 1 %      MCV 93 fL      MCH 32 3 pg      MCHC 34 8 g/dL      RDW 12 7 %      MPV 10 1 fL      Platelets 110 Thousands/uL      nRBC 0 /100 WBCs      Neutrophils Relative 70 %      Lymphocytes Relative 21 %      Monocytes Relative 9 %      Eosinophils Relative 0 %      Basophils Relative 0 %      Neutrophils Absolute 6 05 Thousands/µL      Lymphocytes Absolute 1 83 Thousands/µL      Monocytes Absolute 0 76 Thousand/µL      Eosinophils Absolute 0 02 Thousand/µL      Basophils Absolute 0 01 Thousands/µL     POCT urinalysis dipstick [[id number]]  (Normal) Resulted:  01/22/18 1739    Lab Status:  Final result Specimen:  Urine Updated:  01/22/18 1741     Color, UA yellow     Clarity, UA clear    ED Urine Macroscopic [[id number]]  (Normal) Collected:  01/22/18 1738    Lab Status:  Final result Specimen:  Urine Updated:  01/22/18 6539     Color, UA Yellow     Clarity, UA Clear     pH, UA 6 0     Leukocytes, UA Negative     Nitrite, UA Negative     Protein, UA Negative mg/dl      Glucose, UA Negative mg/dl      Ketones, UA Negative mg/dl      Urobilinogen, UA 0 2 E U /dl      Bilirubin, UA Negative     Blood, UA Negative     Specific Gravity, UA <=1 005    Narrative:       [facility] RESULT                 CT abdomen pelvis with contrast   Final Result by Mike Barker MD (01/22 9450)      Acute sigmoid diverticulitis without perforation or pericolonic abscess  Workstation performed: [id number]                    Procedures  Procedures       Phone Contacts  ED Phone Contact    ED Course  ED Course                                MDM  Number of Diagnoses or Management Options  Diverticulitis: new and requires workup  Diagnosis management comments: 59-year-old male presents for evaluation of lower abdominal pain that started about 12 hours ago  Patient does report history of intermittent abdominal pain in the past although states that this is different and more severe  He reports decreased appetite but denies any nausea or vomiting  He has not had any diarrhea  Denies blood in stool  No fevers, no headache, neck pain, localized numbness, weakness, tingling  No chest pain or shortness of breath  59-year-old male presented for the above  A CT scan of the abdomen and pelvis demonstrated uncomplicated diverticulitis, consistent with the patient's history and physical exam   Additional laboratory studies to rule out leukocytosis, biliary obstructive disease, pancreatitis, electrolyte abnormalities were all unremarkable  Given uncomplicated nature of the diverticulitis on CT scan as well as the patient's lack of nausea or vomiting, outpatient treatment with oral antibiotics is appropriate    Patient received his 1st dose of antibiotics in the emergency department and was discharged with prescription for oral antibiotics as well as analgesia   aware web site was reviewed in the patient has no active prescriptions  Standard narcotic precautions were given  The patient is to arrange for follow-up with his primary care provider to ensure resolution of his symptoms  We discussed return precautions for new or worsening symptoms  Amount and/or Complexity of Data Reviewed  Clinical lab tests: reviewed and ordered  Tests in the radiology section of CPT®: reviewed and ordered  Review and summarize past medical records: yes      CritCare Time    Disposition  Final diagnoses:   Diverticulitis     Time reflects when diagnosis was documented in both MDM as applicable and the Disposition within this note     Time User Action Codes Description Comment    1/22/2018  7:34 PM Jennifer Logan Add [K57 92] Diverticulitis       ED Disposition     ED Disposition Condition Comment    Discharge  Daniella Aguilar discharge to home/self care  Condition at discharge: Good        Follow-up Information     Follow up With Specialties Details Why Na Prado MD [facility]  Please arrange for follow-up with your primary care provider in the next 3 to 5 days  If you have new or worsening symptoms please call your doctor or return to the emergency department           Discharge Medication List as of 1/22/2018  7:36 PM      START taking these medications    Details   amoxicillin-clavulanate (AUGMENTIN) 875-125 mg per tablet Take 1 tablet by mouth 2 (two) times a day for 10 days, Starting Mon 1/22/2018, Until Thu 2/1/2018, Print      HYDROcodone-acetaminophen (NORCO) 5-325 mg per tablet Take 1 tablet by mouth every 6 (six) hours as needed for pain for up to 5 doses Max Daily Amount: 4 tablets, Starting Mon 1/22/2018, Print         CONTINUE these medications which have NOT CHANGED    Details   amLODIPine (NORVASC) 10 mg tablet Take 10 mg by mouth daily, Until Discontinued, Historical Med      aspirin 81 MG tablet Take 81 mg by mouth daily, Until Discontinued, Historical Med      bisoprolol-hydrochlorothiazide (ZIAC) 5-6 25 MG per tablet Take 1 tablet by mouth daily, Until Discontinued, Historical Med      Cholecalciferol (VITAMIN D3) 1000 UNITS CAPS Take 1 capsule by mouth daily, Until Discontinued, Historical Med      folic acid (FOLVITE) 812 MCG tablet Take 400 mcg by mouth daily, Until Discontinued, Historical Med      lisinopril (ZESTRIL) 40 mg tablet Take 40 mg by mouth daily, Until Discontinued, Historical Med      Omega-3 Fatty Acids (FISH OIL) 1,000 mg Take 1,000 mg by mouth daily, Until Discontinued, Historical Med      omeprazole (PriLOSEC) 40 MG capsule Take 40 mg by mouth as needed  , Historical Med      rosuvastatin (CRESTOR) 20 MG tablet Take 20 mg by mouth daily, Until Discontinued, Historical Med      Saw Palmetto, Serenoa repens, 450 MG CAPS Take 1 capsule by mouth daily, Until Discontinued, Historical Med           No discharge procedures on file      ED Provider  Electronically Signed by           Diane Ortiz MD  01/26/18 5662

## 2018-01-23 NOTE — PROGRESS NOTES
Assessment   1  Abdominal pain, lower (352 13) (R10 09)    Discussion/Summary      #1  Abdominal pain-patient was sent to [facility] ER for further evaluation and treatment due to the severity of the abdominal pain  to find out what is causing the abdominal pain  with ER  Possible side effects of new medications were reviewed with the patient/guardian today  The treatment plan was reviewed with the patient/guardian  The patient/guardian understands and agrees with the treatment plan       Self Referrals: No      Chief Complaint   Abdominal cramping, bloating, some diarrhea - woke him out of sleep this a m  Denies nausea / vomiting  Has been seen for these symptoms previously, but notes pain is now more intense  - [facility]      History of Present Illness   HPI: This is a 49-year-old male who comes in with abdominal cramping that started this morning when he woke up  He has had abdominal cramping but no diarrhea but when he does move his bowels it is more mucus than anything else  The cramping is much worse than it had been in the past  He did see [facility] gastroenterology in the past and has an appointment with Dr Mike Ramey on February 12  He denies any nausea or vomiting  The pain is located across his lower abdomen  His blood pressure today is 110/72 and his temperature is 100 3  Review of Systems        Constitutional: as noted in HPI       ENT: no complaints of earache, no loss of hearing, no nosebleeds or nasal discharge, no sore throat or hoarseness  Cardiovascular: no complaints of slow or fast heart rate, no chest pain, no palpitations, no leg claudication or lower extremity edema  Respiratory: no complaints of shortness of breath, no wheezing or cough, no dyspnea on exertion, no orthopnea or PND        Gastrointestinal: Mucousy stools, but-- as noted in HPI,-- no nausea,-- no vomiting,-- no constipation,-- no diarrhea-- and-- no blood in stools--       The patient presents with complaints of sudden onset of severe lower abdominal pain, described as sharp and crampy  Genitourinary: no complaints of dysuria or incontinence, no hesitancy, no nocturia, no genital lesion, no inadequacy of penile erection  Active Problems   1  Abdominal bloating (787 3) (R14 0)   2  Abdominal pain, epigastric (789 06) (R10 13)   3  Alcoholism (303 90) (F10 20)   4  LEO positive (795 79) (R76 8)   5  Arthralgia (719 40) (M25 50)   6  Atherosclerotic heart disease of native coronary artery without angina pectoris (414 01)     (I25 10)   7  Biceps rupture, proximal (840 8) (S46 119A)   8  Biceps tendon tear (840 8) (S46 219A)   9  Blood chemistry abnormality (790 6) (R79 9)   10  Cluster headache (339 00) (G44 009)   11  Colon cancer screening (V76 51) (Z12 11)   12  Elevated LFTs (790 6) (R79 89)   13  Elevated sed rate (790 1) (R70 0)   14  Hiatal hernia (553 3) (K44 9)   15  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)   16  Hyperglycemia (790 29) (R73 9)   17  Hyperlipidemia (272 4) (E78 5)   18  Hypertension (401 9) (I10)   19  Left shoulder pain (719 41) (M25 512)   20  Prostate cancer screening (V76 44) (Z12 5)   21  Sprain, bicep (840 8) (S46 219A)   22  Tick bite (919 4,E906 4) [facility])    Past Medical History   1  History of High blood pressure (401 9) (I10)   2  History of prostatitis (V13 89) (N25 652)    Family History   Mother    1  Family history of Depression   2  Family history of Paranoid Schizophrenia   3  Family history of Pure Hypercholesterolemia  Father    4  Family history of Pure Hypercholesterolemia  Brother    5  Family history of Pure Hypercholesterolemia  Paternal Uncle    10  Family history of Diabetes Mellitus (V18 0)   7   Family history of Lung Cancer (V16 1)    Social History    · Being A Social Drinker   ·    · Exercises, 5x week   · Marital History -  (V61 03)   · Never a smoker   · Never a smoker   · No drug use   · Pets/Animals: Cat   · Social alcohol use (Z78 9)   · Two children    Surgical History   1  History of Appendectomy   2  History of Cath Placement Of Stent 1   3  History of Nasal Septal Deviation Repair   4  History of Septoplasty    Current Meds    1  AmLODIPine Besylate 10 MG Oral Tablet; TAKE 1 TABLET DAILY; Therapy: 61VDS8408 to (Durward Severance)  Requested for: 75UHR5374; Last     Rx:24Cpn5973 Ordered   2  Aspirin 81 MG TABS; Therapy: 00Ppx8004 to Recorded   3  Bisoprolol-Hydrochlorothiazide 5-6 25 MG Oral Tablet; TAKE 1 TABLET DAILY; Therapy: 92Neg7921 to (Evaluate:14Bdc9626)  Requested for: 08CEP4262; Last     Rx:22May2017 Ordered   4  Fish Oil 1000 MG Oral Capsule; TAKE 1 CAPSULE DAILY; Therapy: 93JCL4619 to Recorded   5  Lisinopril 40 MG Oral Tablet; TAKE 1 TABLET DAILY AS DIRECTED; Therapy: 19HDW8287 to (Evaluate:17May2018)  Requested for: 37QKQ3759; Last     Rx:22May2017 Ordered   6  MoviPrep 100 GM Oral Solution Reconstituted; USE AS DIRECTED; Therapy: 36LRT9096 to (Last Arno Alter)  Requested for: 24Oct2016 Ordered   7  Nitrostat 0 4 MG Sublingual Tablet Sublingual;     Therapy: 23Hkd8761 to Recorded   8  Omeprazole 20 MG Oral Capsule Delayed Release; TAKE 1 CAPSULE DAILY EVERY     MORNING BEFORE BREAKFAST; Therapy: 37Gap3773 to (Evaluate:91Bgt2484)  Requested for: 24UZH6989 Recorded   9  RA Folic Acid 692 MCG Oral Tablet; TAKE 1 TABLET DAILY AS DIRECTED; Therapy: 47Vcb1980 to Recorded   10  Rosuvastatin Calcium 20 MG Oral Tablet; Take 1 tablet daily; Therapy: 01OOM8363 to (Last Rx:22May2017)  Requested for: 88TPB9186 Ordered   11  Saw Palmetto 450 MG Oral Capsule; TAKE AS DIRECTED; Therapy: 57AUH7043 to Recorded   12  Vitamin D3 1000 UNIT Oral Tablet; Therapy: 68Iau9800 to Recorded    Allergies   1   No Known Drug Allergies    Vitals    Recorded: 10IQZ6788 02:42PM   Temperature 100 3 F, Oral   Heart Rate 80, L Radial   Pulse Quality Regular, L Radial   Systolic 293, LLE, Sitting   Diastolic 72, LLE, Sitting BP CUFF SIZE Large   Height 6 ft    Weight 209 lb    BMI Calculated 28 35   BSA Calculated 2 17     Physical Exam        Constitutional      General appearance: No acute distress, well appearing and well nourished  Pulmonary      Auscultation of lungs: Clear to auscultation, equal breath sounds bilaterally, no wheezes, no rales, no rhonci  Cardiovascular      Auscultation of heart: Normal rate and rhythm, normal S1 and S2, without murmurs  Abdomen      Abdomen: Abnormal  -- Tender to palpation lower abdomen with intense guarding           Future Appointments      Date/Time Provider Specialty Site   04/26/2018 03:00 PM Christian Dutton [facility]   02/12/2018 07:30 AM Lupis Shepard MD Gastroenterology Adult [facility] [address] [place] [address]    Electronically signed by : Thanh Rogers, [facility]; Jan 22 2018  3:05PM EST                       (Author)     Electronically signed by : Noris Mills DO; Jan 22 2018  3:43PM EST

## 2018-01-23 NOTE — DISCHARGE INSTRUCTIONS
Diverticulitis   WHAT YOU NEED TO KNOW:   Diverticulitis is a condition that causes small pockets along your intestine called diverticula to become inflamed or infected  This is caused by hard bowel movements, food, or bacteria that get stuck in the pockets  DISCHARGE INSTRUCTIONS:   Return to the emergency department if:   · You have bowel movement or foul-smelling discharge leaking from your vagina or in your urine  · You have severe diarrhea  · You urinate less than usual or not at all  · You are not able to have a bowel movement  · You cannot stop vomiting  · You have severe abdominal pain, a fever, and your abdomen is larger than usual      · You have new or increased blood in your bowel movements  Contact your healthcare provider if:   · You have pain when you urinate  · Your symptoms get worse or do not go away  · You have questions or concerns about your condition or care  Medicines:   · Antibiotics  may be given to help treat a bacterial infection  · Prescription pain medicine  may be given  Ask your healthcare provider how to take this medicine safely  Some prescription pain medicines contain acetaminophen  Do not take other medicines that contain acetaminophen without talking to your healthcare provider  Too much acetaminophen may cause liver damage  Prescription pain medicine may cause constipation  Ask your healthcare provider how to prevent or treat constipation  · Take your medicine as directed  Contact your healthcare provider if you think your medicine is not helping or if you have side effects  Tell him or her if you are allergic to any medicine  Keep a list of the medicines, vitamins, and herbs you take  Include the amounts, and when and why you take them  Bring the list or the pill bottles to follow-up visits  Carry your medicine list with you in case of an emergency  Clear liquid diet:  A clear liquid diet includes any liquids that you can see through  Examples include water, ginger-akira, cranberry or apple juice, frozen fruit ice, or broth  Stay on a clear liquid diet until your symptoms are gone, or as directed  Follow up with your healthcare provider as directed: You may need to return for a colonoscopy  When your symptoms are gone, you may need a low-fat, high-fiber diet to prevent diverticulitis from developing again  Your healthcare provider or dietitian can help you create meal plans  Write down your questions so you remember to ask them during your visits  © 2017 [facility] Information is for End User's use only and may not be sold, redistributed or otherwise used for commercial purposes  All illustrations and images included in CareNotes® are the copyrighted property of [employer] , [employer]  or Colin Melgar  The above information is an  only  It is not intended as medical advice for individual conditions or treatments  Talk to your doctor, nurse or pharmacist before following any medical regimen to see if it is safe and effective for you

## 2018-02-12 ENCOUNTER — OFFICE VISIT (OUTPATIENT)
Dept: GASTROENTEROLOGY | Facility: MEDICAL CENTER | Age: 53
End: 2018-02-12
Payer: COMMERCIAL

## 2018-02-12 VITALS
HEIGHT: 72 IN | BODY MASS INDEX: 28.85 KG/M2 | SYSTOLIC BLOOD PRESSURE: 138 MMHG | DIASTOLIC BLOOD PRESSURE: 72 MMHG | WEIGHT: 213 LBS | TEMPERATURE: 98.3 F | HEART RATE: 73 BPM

## 2018-02-12 DIAGNOSIS — K57.32 DIVERTICULITIS OF SIGMOID COLON: Primary | ICD-10-CM

## 2018-02-12 DIAGNOSIS — K21.9 GERD WITHOUT ESOPHAGITIS: ICD-10-CM

## 2018-02-12 PROCEDURE — 99214 OFFICE O/P EST MOD 30 MIN: CPT | Performed by: INTERNAL MEDICINE

## 2018-02-12 NOTE — LETTER
February 12, 2018     Marck Hoskins MD    Patient: David Mcknight   YOB: 1965   Date of Visit: 2/12/2018       Dear Dr Natali Frank:    Thank you for referring Roderick Moreno to me for evaluation  Below are my notes for this consultation  If you have questions, please do not hesitate to call me  I look forward to following your patient along with you           Sincerely,        Dusty Booker MD        CC: No Recipients

## 2018-02-12 NOTE — LETTER
February 12, 2018     Johann Christy PA-C  [address] [place] [address] [employer]    Patient: Philomena Miller   YOB: 1965   Date of Visit: 2/12/2018       Dear Dr Hemanth Lorenzo:    Thank you for referring Catalina Rodriguez to me for evaluation  Below are my notes for this consultation  If you have questions, please do not hesitate to call me  I look forward to following your patient along with you  Sincerely,        Kait Rush MD        CC: No Recipients  Kait Rush MD  2/12/2018  8:09 AM  Sign at close encounter  [facility] - Outpatient Follow-up Note  Philomena Miller 46 y o  male MRN: [id number]  Encounter: [id number]          ASSESSMENT AND PLAN:      1  Diverticulitis of sigmoid colon -he completed a course of po antibiotics  His symptoms have completely resolved  He denies abdominal pain, fever or change in bowel habit  I advised him to continue eating high-fiber diet  Continue taking Metamucil 1 scoop daily  He had colonoscopy in December 2016  No indication to repeat colonoscopy at this time  Patient is advised to go to emergency room if he has recurrent symptoms  2  GERD without esophagitis -we reviewed dietary recommendation  At this time his symptoms are controlled with dietary modification  Continue taking omeprazole on as-needed basis for breakthrough symptoms  His last EGD was in 2016  Negative for esophagitis and Gibbs's esophagus  3   Tubular adenoma of the colon-surveillance colonoscopy is recommended in 2021  4   Abdominal bloating-continue taking probiotics  Follow-up as needed    ______________________________________________________________________    SUBJECTIVE:  71-year-old male with gastroesophageal reflux disease here for follow-up visit to his recent bout of acute diverticulitis  He went to his primary doctor on January 22nd 2018 for evaluation of abdominal pain   He had his suprapubic and left lower quadrant abdominal pain and subsequently sent to emergency room  CT scan of abdomen and pelvis showed sigmoid diverticulitis without abscess  He was given oral antibiotics  His symptoms have completely resolved  He his last colonoscopy was December 2016  He had sigmoid diverticulosis and 2 polyps removed pathology from 1 of the polyp was tubular adenoma  He continued to have intermittent bloating without change in bowel movement  He has gastroesophageal reflux disease  He takes omeprazole 1-2 times per week for breakthrough symptoms  His symptoms are mostly controlled with dietary modification  He continues to drink alcohol but has significantly cut down on the volume  His most recent LFTs have improved  He has ALT is 55 and AST 33  Normocytic functions  REVIEW OF SYSTEMS IS OTHERWISE NEGATIVE  Historical Information   Past Medical History:   Diagnosis Date    Hypercholesteremia     Hyperlipidemia     Hypertension      Past Surgical History:   Procedure Laterality Date    APPENDECTOMY      CORONARY ANGIOPLASTY WITH STENT PLACEMENT      CT COLONOSCOPY FLX DX W/COLLJ SPEC WHEN PFRMD N/A 12/9/2016    Procedure: EGD AND COLONOSCOPY;  Surgeon: Donald Ruiz MD;  Location: [facility]; Service: Gastroenterology     Social History   History   Alcohol Use    Yes     Comment: "probably every day"      History   Drug Use No     History   Smoking Status    Never Smoker   Smokeless Tobacco    Never Used     History reviewed  No pertinent family history      Meds/Allergies       Current Outpatient Prescriptions:     amLODIPine (NORVASC) 10 mg tablet    aspirin 81 MG tablet    bisoprolol-hydrochlorothiazide (ZIAC) 5-6 25 MG per tablet    Cholecalciferol (VITAMIN D3) 1000 UNITS CAPS    folic acid (FOLVITE) 800 MCG tablet    HYDROcodone-acetaminophen (NORCO) 5-325 mg per tablet    lisinopril (ZESTRIL) 40 mg tablet    Omega-3 Fatty Acids (FISH OIL) 1,000 mg    omeprazole (PriLOSEC) 40 MG capsule   rosuvastatin (CRESTOR) 20 MG tablet    Saw Palmetto, Serenoa repens, 450 MG CAPS    No Known Allergies        Objective     Blood pressure 138/72, pulse 73, temperature 98 3 °F (36 8 °C), temperature source Oral, height 6' (1 829 m), weight 96 6 kg (213 lb)  PHYSICAL EXAM:      General Appearance:   Alert, cooperative, no distress   HEENT:   Normocephalic, atraumatic, anicteric      Neck:  Supple, symmetrical, trachea midline   Lungs:   Clear to auscultation bilaterally; no rales, rhonchi or wheezing; respirations unlabored    Heart[de-identified]   Regular rate and rhythm; no murmur, rub, or gallop  Abdomen:   Soft, non-tender, non-distended; normal bowel sounds; no masses, no organomegaly    Genitalia:   Deferred    Rectal:   Deferred    Extremities:  No cyanosis, clubbing or edema    Pulses:  2+ and symmetric    Skin:  No jaundice, rashes, or lesions    Lymph nodes:  No palpable cervical lymphadenopathy        Lab Results:   No visits with results within 1 Day(s) from this visit     Latest known visit with results is:   Admission on 01/22/2018, Discharged on 01/22/2018   Component Date Value    Sodium 01/22/2018 140     Potassium 01/22/2018 3 8     Chloride 01/22/2018 99*    CO2 01/22/2018 27     Anion Gap 01/22/2018 14*    BUN 01/22/2018 12     Creatinine 01/22/2018 0 97     Glucose 01/22/2018 97     Calcium 01/22/2018 9 3     AST 01/22/2018 33     ALT 01/22/2018 55     Alkaline Phosphatase 01/22/2018 106     Total Protein 01/22/2018 8 5*    Albumin 01/22/2018 3 8     Total Bilirubin 01/22/2018 0 74     eGFR 01/22/2018 89     WBC 01/22/2018 8 67     RBC 01/22/2018 4 43     Hemoglobin 01/22/2018 14 3     Hematocrit 01/22/2018 41 1     MCV 01/22/2018 93     MCH 01/22/2018 32 3     MCHC 01/22/2018 34 8     RDW 01/22/2018 12 7     MPV 01/22/2018 10 1     Platelets 97/95/9561 167     nRBC 01/22/2018 0     Neutrophils Relative 01/22/2018 70     Lymphocytes Relative 01/22/2018 21     Monocytes Relative 01/22/2018 9     Eosinophils Relative 01/22/2018 0     Basophils Relative 01/22/2018 0     Neutrophils Absolute 01/22/2018 6 05     Lymphocytes Absolute 01/22/2018 1 83     Monocytes Absolute 01/22/2018 0 76     Eosinophils Absolute 01/22/2018 0 02     Basophils Absolute 01/22/2018 0 01     Lipase 01/22/2018 126     Color, UA 01/22/2018 yellow     Clarity, UA 01/22/2018 clear     Color, UA 01/22/2018 Yellow     Clarity, UA 01/22/2018 Clear     pH, UA 01/22/2018 6 0     Leukocytes, UA 01/22/2018 Negative     Nitrite, UA 01/22/2018 Negative     Protein, UA 01/22/2018 Negative     Glucose, UA 01/22/2018 Negative     Ketones, UA 01/22/2018 Negative     Urobilinogen, UA 01/22/2018 0 2     Bilirubin, UA 01/22/2018 Negative     Blood, UA 01/22/2018 Negative     Specific Gravity, UA 01/22/2018 <=1 005      CT ABDOMEN AND PELVIS WITH IV CONTRAST     INDICATION:  "PATIENT C/O LOWER ABDOMINAL PAIN SINCE YESTERDAY;  MUCOUS RECTAL DISCHARGE Hx APPENDECTOMY"     COMPARISON: Abdominal ultrasound 8/5/2016      TECHNIQUE:  CT examination of the abdomen and pelvis was performed  Reformatted images were created in axial, sagittal, and coronal planes        Radiation dose length product (DLP) for this visit:  633 mGy-cm   This examination, like all CT scans performed in the [facility], was performed utilizing techniques to minimize radiation dose exposure, including the use of iterative   reconstruction and automated exposure control      IV Contrast:  100 mL of iohexol (OMNIPAQUE)  350 single dose      Enteric Contrast:  Enteric contrast was not administered      FINDINGS:     ABDOMEN     LOWER CHEST:  2 mm left lower lobe nodule as a discoid appearance on the coronal images and is in keeping with scar      LIVER/BILIARY TREE:  Severe fatty infiltration liver as seen on the ultrasound      GALLBLADDER:  No calcified gallstones   No pericholecystic inflammatory change      SPLEEN: Unremarkable      PANCREAS:  Unremarkable      ADRENAL GLANDS:  Unremarkable      KIDNEYS/URETERS:  Unremarkable  No hydronephrosis      STOMACH AND BOWEL:  Focal inflammatory changes about the proximal to mid sigmoid colon with underlying diverticuli in keeping with acute diverticulitis      No bowel obstruction      APPENDIX:  Surgically absent      ABDOMINOPELVIC CAVITY:  No free air or ascites    No lymphadenopathy      VESSELS:  Unremarkable for patient's age      PELVIS     REPRODUCTIVE ORGANS:  Unremarkable for patient's age      URINARY BLADDER:  Mild thickening of the bladder, most prominent adjacent to the site of diverticulitis is likely present on a reactive basis      ABDOMINAL WALL/INGUINAL REGIONS:  Small fat-containing inguinal hernias      OSSEOUS STRUCTURES:  No acute fracture or destructive osseous lesion      IMPRESSION:     Acute sigmoid diverticulitis without perforation or pericolonic abscess

## 2018-02-12 NOTE — PROGRESS NOTES
[facility] - Outpatient Follow-up Note  Chalino Venegas 46 y o  male MRN: [id number]  Encounter: [id number]          ASSESSMENT AND PLAN:      1  Diverticulitis of sigmoid colon -he completed a course of po antibiotics  His symptoms have completely resolved  He denies abdominal pain, fever or change in bowel habit  I advised him to continue eating high-fiber diet  Continue taking Metamucil 1 scoop daily  He had colonoscopy in December 2016  No indication to repeat colonoscopy at this time  Patient is advised to go to emergency room if he has recurrent symptoms  2  GERD without esophagitis -we reviewed dietary recommendation  At this time his symptoms are controlled with dietary modification  Continue taking omeprazole on as-needed basis for breakthrough symptoms  His last EGD was in 2016  Negative for esophagitis and Gibbs's esophagus  3   Tubular adenoma of the colon-surveillance colonoscopy is recommended in 2021  4   Abdominal bloating-continue taking probiotics  Follow-up as needed    ______________________________________________________________________    SUBJECTIVE:  51-year-old male with gastroesophageal reflux disease here for follow-up visit to his recent bout of acute diverticulitis  He went to his primary doctor on January 22nd 2018 for evaluation of abdominal pain  He had his suprapubic and left lower quadrant abdominal pain and subsequently sent to emergency room  CT scan of abdomen and pelvis showed sigmoid diverticulitis without abscess  He was given oral antibiotics  His symptoms have completely resolved  He his last colonoscopy was December 2016  He had sigmoid diverticulosis and 2 polyps removed pathology from 1 of the polyp was tubular adenoma  He continued to have intermittent bloating without change in bowel movement  He has gastroesophageal reflux disease  He takes omeprazole 1-2 times per week for breakthrough symptoms    His symptoms are mostly controlled with dietary modification  He continues to drink alcohol but has significantly cut down on the volume  His most recent LFTs have improved  He has ALT is 55 and AST 33  Normocytic functions  REVIEW OF SYSTEMS IS OTHERWISE NEGATIVE  Historical Information   Past Medical History:   Diagnosis Date    Hypercholesteremia     Hyperlipidemia     Hypertension      Past Surgical History:   Procedure Laterality Date    APPENDECTOMY      CORONARY ANGIOPLASTY WITH STENT PLACEMENT      WA COLONOSCOPY FLX DX W/COLLJ SPEC WHEN PFRMD N/A 12/9/2016    Procedure: EGD AND COLONOSCOPY;  Surgeon: Donald Ruiz MD;  Location: [facility]; Service: Gastroenterology     Social History   History   Alcohol Use    Yes     Comment: "probably every day"      History   Drug Use No     History   Smoking Status    Never Smoker   Smokeless Tobacco    Never Used     History reviewed  No pertinent family history  Meds/Allergies       Current Outpatient Prescriptions:     amLODIPine (NORVASC) 10 mg tablet    aspirin 81 MG tablet    bisoprolol-hydrochlorothiazide (ZIAC) 5-6 25 MG per tablet    Cholecalciferol (VITAMIN D3) 1000 UNITS CAPS    folic acid (FOLVITE) 285 MCG tablet    HYDROcodone-acetaminophen (NORCO) 5-325 mg per tablet    lisinopril (ZESTRIL) 40 mg tablet    Omega-3 Fatty Acids (FISH OIL) 1,000 mg    omeprazole (PriLOSEC) 40 MG capsule    rosuvastatin (CRESTOR) 20 MG tablet    Saw Palmetto, Serenoa repens, 450 MG CAPS    No Known Allergies        Objective     Blood pressure 138/72, pulse 73, temperature 98 3 °F (36 8 °C), temperature source Oral, height 6' (1 829 m), weight 96 6 kg (213 lb)        PHYSICAL EXAM:      General Appearance:   Alert, cooperative, no distress   HEENT:   Normocephalic, atraumatic, anicteric      Neck:  Supple, symmetrical, trachea midline   Lungs:   Clear to auscultation bilaterally; no rales, rhonchi or wheezing; respirations unlabored  Heart[de-identified]   Regular rate and rhythm; no murmur, rub, or gallop  Abdomen:   Soft, non-tender, non-distended; normal bowel sounds; no masses, no organomegaly    Genitalia:   Deferred    Rectal:   Deferred    Extremities:  No cyanosis, clubbing or edema    Pulses:  2+ and symmetric    Skin:  No jaundice, rashes, or lesions    Lymph nodes:  No palpable cervical lymphadenopathy        Lab Results:   No visits with results within 1 Day(s) from this visit     Latest known visit with results is:   Admission on 01/22/2018, Discharged on 01/22/2018   Component Date Value    Sodium 01/22/2018 140     Potassium 01/22/2018 3 8     Chloride 01/22/2018 99*    CO2 01/22/2018 27     Anion Gap 01/22/2018 14*    BUN 01/22/2018 12     Creatinine 01/22/2018 0 97     Glucose 01/22/2018 97     Calcium 01/22/2018 9 3     AST 01/22/2018 33     ALT 01/22/2018 55     Alkaline Phosphatase 01/22/2018 106     Total Protein 01/22/2018 8 5*    Albumin 01/22/2018 3 8     Total Bilirubin 01/22/2018 0 74     eGFR 01/22/2018 89     WBC 01/22/2018 8 67     RBC 01/22/2018 4 43     Hemoglobin 01/22/2018 14 3     Hematocrit 01/22/2018 41 1     MCV 01/22/2018 93     MCH 01/22/2018 32 3     MCHC 01/22/2018 34 8     RDW 01/22/2018 12 7     MPV 01/22/2018 10 1     Platelets 35/28/9048 167     nRBC 01/22/2018 0     Neutrophils Relative 01/22/2018 70     Lymphocytes Relative 01/22/2018 21     Monocytes Relative 01/22/2018 9     Eosinophils Relative 01/22/2018 0     Basophils Relative 01/22/2018 0     Neutrophils Absolute 01/22/2018 6 05     Lymphocytes Absolute 01/22/2018 1 83     Monocytes Absolute 01/22/2018 0 76     Eosinophils Absolute 01/22/2018 0 02     Basophils Absolute 01/22/2018 0 01     Lipase 01/22/2018 126     Color, UA 01/22/2018 yellow     Clarity, UA 01/22/2018 clear     Color, UA 01/22/2018 Yellow     Clarity, UA 01/22/2018 Clear     pH, UA 01/22/2018 6 0     Leukocytes, UA 01/22/2018 Negative     Nitrite, UA 01/22/2018 Negative     Protein, UA 01/22/2018 Negative     Glucose, UA 01/22/2018 Negative     Ketones, UA 01/22/2018 Negative     Urobilinogen, UA 01/22/2018 0 2     Bilirubin, UA 01/22/2018 Negative     Blood, UA 01/22/2018 Negative     Specific Gravity, UA 01/22/2018 <=1 005      CT ABDOMEN AND PELVIS WITH IV CONTRAST     INDICATION:  "PATIENT C/O LOWER ABDOMINAL PAIN SINCE YESTERDAY;  MUCOUS RECTAL DISCHARGE Hx APPENDECTOMY"     COMPARISON: Abdominal ultrasound 8/5/2016      TECHNIQUE:  CT examination of the abdomen and pelvis was performed  Reformatted images were created in axial, sagittal, and coronal planes        Radiation dose length product (DLP) for this visit:  633 mGy-cm   This examination, like all CT scans performed in the [facility], was performed utilizing techniques to minimize radiation dose exposure, including the use of iterative   reconstruction and automated exposure control      IV Contrast:  100 mL of iohexol (OMNIPAQUE)  350 single dose      Enteric Contrast:  Enteric contrast was not administered      FINDINGS:     ABDOMEN     LOWER CHEST:  2 mm left lower lobe nodule as a discoid appearance on the coronal images and is in keeping with scar      LIVER/BILIARY TREE:  Severe fatty infiltration liver as seen on the ultrasound      GALLBLADDER:  No calcified gallstones  No pericholecystic inflammatory change      SPLEEN:  Unremarkable      PANCREAS:  Unremarkable      ADRENAL GLANDS:  Unremarkable      KIDNEYS/URETERS:  Unremarkable  No hydronephrosis      STOMACH AND BOWEL:  Focal inflammatory changes about the proximal to mid sigmoid colon with underlying diverticuli in keeping with acute diverticulitis      No bowel obstruction      APPENDIX:  Surgically absent      ABDOMINOPELVIC CAVITY:  No free air or ascites    No lymphadenopathy      VESSELS:  Unremarkable for patient's age      PELVIS     REPRODUCTIVE ORGANS:  Unremarkable for patient's age      URINARY BLADDER:  Mild thickening of the bladder, most prominent adjacent to the site of diverticulitis is likely present on a reactive basis      ABDOMINAL WALL/INGUINAL REGIONS:  Small fat-containing inguinal hernias      OSSEOUS STRUCTURES:  No acute fracture or destructive osseous lesion      IMPRESSION:     Acute sigmoid diverticulitis without perforation or pericolonic abscess

## 2018-04-10 ENCOUNTER — APPOINTMENT (OUTPATIENT)
Dept: RADIOLOGY | Facility: MEDICAL CENTER | Age: 53
End: 2018-04-10
Payer: COMMERCIAL

## 2018-04-10 ENCOUNTER — TRANSCRIBE ORDERS (OUTPATIENT)
Dept: ADMINISTRATIVE | Facility: HOSPITAL | Age: 53
End: 2018-04-10

## 2018-04-10 ENCOUNTER — OFFICE VISIT (OUTPATIENT)
Dept: FAMILY MEDICINE CLINIC | Facility: CLINIC | Age: 53
End: 2018-04-10
Payer: COMMERCIAL

## 2018-04-10 VITALS
TEMPERATURE: 97.1 F | WEIGHT: 214.2 LBS | BODY MASS INDEX: 30.67 KG/M2 | SYSTOLIC BLOOD PRESSURE: 114 MMHG | HEIGHT: 70 IN | DIASTOLIC BLOOD PRESSURE: 66 MMHG | HEART RATE: 60 BPM

## 2018-04-10 DIAGNOSIS — M72.2 PLANTAR FASCIITIS: Primary | ICD-10-CM

## 2018-04-10 DIAGNOSIS — I10 ESSENTIAL HYPERTENSION: ICD-10-CM

## 2018-04-10 DIAGNOSIS — M19.049 CMC ARTHRITIS: ICD-10-CM

## 2018-04-10 DIAGNOSIS — K44.9 HIATAL HERNIA: ICD-10-CM

## 2018-04-10 PROBLEM — K57.92 DIVERTICULITIS: Status: ACTIVE | Noted: 2018-01-22

## 2018-04-10 PROBLEM — R10.30 ABDOMINAL PAIN, LOWER: Status: ACTIVE | Noted: 2018-01-22

## 2018-04-10 PROCEDURE — 99214 OFFICE O/P EST MOD 30 MIN: CPT | Performed by: FAMILY MEDICINE

## 2018-04-10 PROCEDURE — 73130 X-RAY EXAM OF HAND: CPT

## 2018-04-10 PROCEDURE — 3008F BODY MASS INDEX DOCD: CPT | Performed by: FAMILY MEDICINE

## 2018-04-10 RX ORDER — NAPROXEN SODIUM 220 MG
440 TABLET ORAL 2 TIMES DAILY WITH MEALS
Qty: 120 TABLET | Refills: 0
Start: 2018-04-10 | End: 2018-07-24 | Stop reason: ALTCHOICE

## 2018-04-10 NOTE — ASSESSMENT & PLAN NOTE
Patient appears to have acute plantar fasciitis  Recommend Aleve 2 pills twice a day  This should be for approximately 1 month  Would also recommend a quarter-inch felt or leather heel lift in both shoes  He can consider following with Podiatry  They would likely do orthotic inserts  Gel inserts into his boots her shoes are likely to not make a difference unless he has lift underneath them  With regard to working and walking, he should continue to do that as long as it is not causing him significant discomfort

## 2018-04-10 NOTE — PATIENT INSTRUCTIONS
Problem List Items Addressed This Visit     Hiatal hernia     Patient does have a history of hiatal hernia, and reports that he had bad bouts of reflux in the past   I would recommend that he use omeprazole while he is on Aleve  This would decrease the chances of Aleve causing stomach upset  Hypertension     Blood pressure today is reasonable  No changes  Watch for increases with Aleve  Plantar fasciitis - Primary     Patient appears to have acute plantar fasciitis  Recommend Aleve 2 pills twice a day  This should be for approximately 1 month  Would also recommend a quarter-inch felt or leather heel lift in both shoes  He can consider following with Podiatry  They would likely do orthotic inserts  Gel inserts into his boots her shoes are likely to not make a difference unless he has lift underneath them  With regard to working and walking, he should continue to do that as long as it is not causing him significant discomfort  Relevant Medications    naproxen sodium (ALEVE) 220 MG tablet    CMC arthritis     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

## 2018-04-10 NOTE — ASSESSMENT & PLAN NOTE
Patient does have a history of hiatal hernia, and reports that he had bad bouts of reflux in the past   I would recommend that he use omeprazole while he is on Aleve  This would decrease the chances of Aleve causing stomach upset

## 2018-04-10 NOTE — ASSESSMENT & PLAN NOTE
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

## 2018-04-10 NOTE — PROGRESS NOTES
Assessment/Plan:    Plantar fasciitis  Patient appears to have acute plantar fasciitis  Recommend Aleve 2 pills twice a day  This should be for approximately 1 month  Would also recommend a quarter-inch felt or leather heel lift in both shoes  He can consider following with Podiatry  They would likely do orthotic inserts  Gel inserts into his boots her shoes are likely to not make a difference unless he has lift underneath them  With regard to working and walking, he should continue to do that as long as it is not causing him significant discomfort  Hiatal hernia  Patient does have a history of hiatal hernia, and reports that he had bad bouts of reflux in the past   I would recommend that he use omeprazole while he is on Aleve  This would decrease the chances of Aleve causing stomach upset  Hypertension  Blood pressure today is reasonable  No changes  Watch for increases with Aleve  Aia 16 arthritis  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  This is at the 1st joint  Would recommend x-rays of the hands  Diagnoses and all orders for this visit:    Plantar fasciitis  -     naproxen sodium (ALEVE) 220 MG tablet; Take 2 tablets (440 mg total) by mouth 2 (two) times a day with meals for 30 days    Essential hypertension    Hiatal hernia    CMC arthritis  -     XR hand 3+ vw right; Future  -     XR hand 3+ vw left; Future          Subjective:      Patient ID: Sandra Miller is a 46 y o  male  CC:  Heel pain (l) foot  X 1 month or more  Patient has a significant amount of pain in the morning  Loosens up as he walks throughout the day  Worse through the end of the day  If he rests for a bit at any point during the day, the whole cycle restarts  He does wear boots all the time for work, and noted that new boots that he just got did not change anything  Patient did mention that is thumbs are bothering him quite a bit    It is bilateral   Again, the patient does work on some electric shin, 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  Patient does have a history of hypertension  He is doing quite well with that  Denies any particular problems  He has had elevated LFTs in the past   Most recent labs he reports are normal   Reviewed the January laboratory studies, they were normal   CAD:  Patient is following with [facility]iology at [facility]  Denies any current problems  The following portions of the patient's history were reviewed and updated as appropriate: allergies, current medications, past family history, past medical history, past social history, past surgical history and problem list     Review of Systems   Constitutional: Negative  HENT: Negative  Respiratory: Negative  Musculoskeletal:        Per HPI         Objective:  Vitals:    04/10/18 0931   BP: 114/66   BP Location: Left arm   Patient Position: Sitting   Cuff Size: Large   Pulse: 60   Temp: (!) 97 1 °F (36 2 °C)   TempSrc: Tympanic   Weight: 97 2 kg (214 lb 3 2 oz)   Height: 5' 10" (1 778 m)       /66 (BP Location: Left arm, Patient Position: Sitting, Cuff Size: Large)   Pulse 60   Temp (!) 97 1 °F (36 2 °C) (Tympanic)   Ht 5' 10" (1 778 m)   Wt 97 2 kg (214 lb 3 2 oz)   BMI 30 73 kg/m²          Physical Exam   Musculoskeletal:        Hands:       Feet:    Nursing note and vitals reviewed

## 2018-04-13 DIAGNOSIS — M18.0 ARTHRITIS OF CARPOMETACARPAL (CMC) JOINT OF BOTH THUMBS: Primary | ICD-10-CM

## 2018-04-19 DIAGNOSIS — I25.10 ATHEROSCLEROTIC HEART DISEASE OF NATIVE CORONARY ARTERY WITHOUT ANGINA PECTORIS: ICD-10-CM

## 2018-04-19 DIAGNOSIS — F10.20 UNCOMPLICATED ALCOHOL DEPENDENCE (HCC): ICD-10-CM

## 2018-04-19 DIAGNOSIS — Z12.5 ENCOUNTER FOR SCREENING FOR MALIGNANT NEOPLASM OF PROSTATE: ICD-10-CM

## 2018-04-19 DIAGNOSIS — E78.5 HYPERLIPIDEMIA: ICD-10-CM

## 2018-04-19 DIAGNOSIS — K44.9 DIAPHRAGMATIC HERNIA WITHOUT OBSTRUCTION OR GANGRENE: ICD-10-CM

## 2018-04-19 DIAGNOSIS — I10 ESSENTIAL (PRIMARY) HYPERTENSION: ICD-10-CM

## 2018-04-19 DIAGNOSIS — R73.9 HYPERGLYCEMIA: ICD-10-CM

## 2018-04-19 DIAGNOSIS — R79.89 OTHER SPECIFIED ABNORMAL FINDINGS OF BLOOD CHEMISTRY: ICD-10-CM

## 2018-06-11 DIAGNOSIS — I10 HYPERTENSION, UNSPECIFIED TYPE: ICD-10-CM

## 2018-06-11 DIAGNOSIS — E78.5 HYPERLIPIDEMIA, UNSPECIFIED HYPERLIPIDEMIA TYPE: Primary | ICD-10-CM

## 2018-06-11 RX ORDER — ROSUVASTATIN CALCIUM 20 MG/1
20 TABLET, COATED ORAL DAILY
Qty: 90 TABLET | Refills: 0 | Status: SHIPPED | OUTPATIENT
Start: 2018-06-11 | End: 2018-07-24 | Stop reason: SDUPTHER

## 2018-06-11 RX ORDER — LISINOPRIL 40 MG/1
40 TABLET ORAL DAILY
Qty: 90 TABLET | Refills: 0 | Status: SHIPPED | OUTPATIENT
Start: 2018-06-11 | End: 2018-07-24 | Stop reason: SDUPTHER

## 2018-06-11 RX ORDER — BISOPROLOL FUMARATE AND HYDROCHLOROTHIAZIDE 5; 6.25 MG/1; MG/1
1 TABLET ORAL DAILY
Qty: 90 TABLET | Refills: 0 | Status: SHIPPED | OUTPATIENT
Start: 2018-06-11 | End: 2018-09-18 | Stop reason: SDUPTHER

## 2018-06-11 RX ORDER — AMLODIPINE BESYLATE 10 MG/1
10 TABLET ORAL DAILY
Qty: 90 TABLET | Refills: 0 | Status: SHIPPED | OUTPATIENT
Start: 2018-06-11 | End: 2018-07-24 | Stop reason: SDUPTHER

## 2018-07-18 ENCOUNTER — APPOINTMENT (OUTPATIENT)
Dept: LAB | Facility: CLINIC | Age: 53
End: 2018-07-18
Payer: COMMERCIAL

## 2018-07-18 DIAGNOSIS — K44.9 DIAPHRAGMATIC HERNIA WITHOUT OBSTRUCTION OR GANGRENE: ICD-10-CM

## 2018-07-18 DIAGNOSIS — R79.89 OTHER SPECIFIED ABNORMAL FINDINGS OF BLOOD CHEMISTRY: ICD-10-CM

## 2018-07-18 DIAGNOSIS — I10 ESSENTIAL (PRIMARY) HYPERTENSION: ICD-10-CM

## 2018-07-18 DIAGNOSIS — I25.10 ATHEROSCLEROTIC HEART DISEASE OF NATIVE CORONARY ARTERY WITHOUT ANGINA PECTORIS: ICD-10-CM

## 2018-07-18 DIAGNOSIS — Z12.5 ENCOUNTER FOR SCREENING FOR MALIGNANT NEOPLASM OF PROSTATE: ICD-10-CM

## 2018-07-18 DIAGNOSIS — E78.5 HYPERLIPIDEMIA: ICD-10-CM

## 2018-07-18 DIAGNOSIS — F10.20 UNCOMPLICATED ALCOHOL DEPENDENCE (HCC): ICD-10-CM

## 2018-07-18 DIAGNOSIS — R73.9 HYPERGLYCEMIA: ICD-10-CM

## 2018-07-18 LAB
ALBUMIN SERPL BCP-MCNC: 4.1 G/DL (ref 3.5–5)
ALP SERPL-CCNC: 103 U/L (ref 46–116)
ALT SERPL W P-5'-P-CCNC: 125 U/L (ref 12–78)
ANION GAP SERPL CALCULATED.3IONS-SCNC: 5 MMOL/L (ref 4–13)
AST SERPL W P-5'-P-CCNC: 80 U/L (ref 5–45)
BILIRUB SERPL-MCNC: 0.8 MG/DL (ref 0.2–1)
BUN SERPL-MCNC: 13 MG/DL (ref 5–25)
CALCIUM SERPL-MCNC: 9.3 MG/DL (ref 8.3–10.1)
CHLORIDE SERPL-SCNC: 105 MMOL/L (ref 100–108)
CHOLEST SERPL-MCNC: 153 MG/DL (ref 50–200)
CO2 SERPL-SCNC: 27 MMOL/L (ref 21–32)
CREAT SERPL-MCNC: 0.89 MG/DL (ref 0.6–1.3)
EST. AVERAGE GLUCOSE BLD GHB EST-MCNC: 97 MG/DL
GFR SERPL CREATININE-BSD FRML MDRD: 98 ML/MIN/1.73SQ M
GLUCOSE P FAST SERPL-MCNC: 91 MG/DL (ref 65–99)
HBA1C MFR BLD: 5 % (ref 4.2–6.3)
HDLC SERPL-MCNC: 67 MG/DL (ref 40–60)
LDLC SERPL CALC-MCNC: 60 MG/DL (ref 0–100)
POTASSIUM SERPL-SCNC: 4.1 MMOL/L (ref 3.5–5.3)
PROT SERPL-MCNC: 8 G/DL (ref 6.4–8.2)
PSA SERPL-MCNC: 0.8 NG/ML (ref 0–4)
SODIUM SERPL-SCNC: 137 MMOL/L (ref 136–145)
TRIGL SERPL-MCNC: 129 MG/DL

## 2018-07-18 PROCEDURE — 80053 COMPREHEN METABOLIC PANEL: CPT

## 2018-07-18 PROCEDURE — G0103 PSA SCREENING: HCPCS

## 2018-07-18 PROCEDURE — 83036 HEMOGLOBIN GLYCOSYLATED A1C: CPT

## 2018-07-18 PROCEDURE — 80061 LIPID PANEL: CPT

## 2018-07-18 PROCEDURE — 36415 COLL VENOUS BLD VENIPUNCTURE: CPT

## 2018-07-19 NOTE — ASSESSMENT & PLAN NOTE
AST ALT elevated again at 80 and 125  Patient encouraged to decrease alcohol intake to eliminate elevated liver function results  Recheck 3 months and 6 months

## 2018-07-24 ENCOUNTER — OFFICE VISIT (OUTPATIENT)
Dept: FAMILY MEDICINE CLINIC | Facility: CLINIC | Age: 53
End: 2018-07-24
Payer: COMMERCIAL

## 2018-07-24 VITALS
BODY MASS INDEX: 27.43 KG/M2 | DIASTOLIC BLOOD PRESSURE: 64 MMHG | WEIGHT: 191.2 LBS | SYSTOLIC BLOOD PRESSURE: 118 MMHG | HEART RATE: 68 BPM

## 2018-07-24 DIAGNOSIS — A08.4 VIRAL GASTROENTERITIS: ICD-10-CM

## 2018-07-24 DIAGNOSIS — E78.2 MIXED HYPERLIPIDEMIA: ICD-10-CM

## 2018-07-24 DIAGNOSIS — I10 HYPERTENSION, UNSPECIFIED TYPE: ICD-10-CM

## 2018-07-24 DIAGNOSIS — R73.9 HYPERGLYCEMIA: ICD-10-CM

## 2018-07-24 DIAGNOSIS — F10.20 ALCOHOLISM (HCC): ICD-10-CM

## 2018-07-24 DIAGNOSIS — K21.9 GASTROESOPHAGEAL REFLUX DISEASE WITHOUT ESOPHAGITIS: ICD-10-CM

## 2018-07-24 DIAGNOSIS — I10 ESSENTIAL HYPERTENSION: Primary | ICD-10-CM

## 2018-07-24 DIAGNOSIS — R79.89 ELEVATED LFTS: ICD-10-CM

## 2018-07-24 DIAGNOSIS — I25.10 ATHEROSCLEROSIS OF NATIVE CORONARY ARTERY OF NATIVE HEART WITHOUT ANGINA PECTORIS: ICD-10-CM

## 2018-07-24 DIAGNOSIS — E78.5 HYPERLIPIDEMIA, UNSPECIFIED HYPERLIPIDEMIA TYPE: ICD-10-CM

## 2018-07-24 PROCEDURE — 3078F DIAST BP <80 MM HG: CPT | Performed by: PHYSICIAN ASSISTANT

## 2018-07-24 PROCEDURE — 99214 OFFICE O/P EST MOD 30 MIN: CPT | Performed by: PHYSICIAN ASSISTANT

## 2018-07-24 PROCEDURE — 1036F TOBACCO NON-USER: CPT | Performed by: PHYSICIAN ASSISTANT

## 2018-07-24 PROCEDURE — 3074F SYST BP LT 130 MM HG: CPT | Performed by: PHYSICIAN ASSISTANT

## 2018-07-24 RX ORDER — LISINOPRIL 40 MG/1
40 TABLET ORAL DAILY
Qty: 90 TABLET | Refills: 1 | Status: SHIPPED | OUTPATIENT
Start: 2018-07-24 | End: 2018-07-31 | Stop reason: SDUPTHER

## 2018-07-24 RX ORDER — ROSUVASTATIN CALCIUM 20 MG/1
20 TABLET, COATED ORAL DAILY
Qty: 90 TABLET | Refills: 1 | Status: SHIPPED | OUTPATIENT
Start: 2018-07-24 | End: 2018-07-31 | Stop reason: SDUPTHER

## 2018-07-24 RX ORDER — AMLODIPINE BESYLATE 10 MG/1
10 TABLET ORAL DAILY
Qty: 90 TABLET | Refills: 1 | Status: SHIPPED | OUTPATIENT
Start: 2018-07-24 | End: 2018-07-31 | Stop reason: SDUPTHER

## 2018-07-24 RX ORDER — OMEPRAZOLE 40 MG/1
40 CAPSULE, DELAYED RELEASE ORAL AS NEEDED
Qty: 90 CAPSULE | Refills: 0 | Status: SHIPPED | OUTPATIENT
Start: 2018-07-24 | End: 2018-07-31 | Stop reason: SDUPTHER

## 2018-07-24 NOTE — PROGRESS NOTES
Assessment/Plan:    Mixed hyperlipidemia  Very stable with an LDL of 60  Continue current medication recheck 6 months  Elevated LFTs  AST ALT elevated again at 80 and 125  Patient encouraged to decrease alcohol intake to eliminate elevated liver function results  Recheck 3 months and 6 months  Hyperglycemia  A1c and glucose normal     Essential hypertension  Very stable continue current medication  Recheck 6 months  Viral gastroenteritis  Patient having symptoms of abdominal discomfort associated with some nausea vomiting diarrhea  I do believe that this is more viral gastroenteritis then exacerbation of diverticulitis at this time  Patient is to use increase fluids brat diet eliminate dairy and caffeine and alcohol until normal bowel movements  He is also however instructed to go to the emergency room if his symptoms worsen and do not improve  Alcoholism ([facility] [address] )  Patient encouraged to decrease alcohol intake to eliminate elevated liver function results  Recheck 3 months and 6 months  Diagnoses and all orders for this visit:    Essential hypertension  -     Comprehensive metabolic panel; Future    Hyperglycemia  -     Hemoglobin A1C; Future    Mixed hyperlipidemia  -     Lipid Panel with Direct LDL reflex; Future    Elevated LFTs  -     Comprehensive metabolic panel; Future  -     AST; Future  -     ALT; Future    Alcoholism ([facility] [address] )  -     AST; Future  -     ALT; Future    Atherosclerosis of native coronary artery of native heart without angina pectoris    Viral gastroenteritis    Other orders  -     Probiotic Product (PROBIOTIC-10 PO); Take by mouth          Subjective:   CC: Follow up to review blood work  C/o abdominal pain x 1 day and some vomiting last evening   skip   Patient ID: Fab Washington is a 46 y o  male        Fab Felix is here for chronic conditions f/u including the diagnosis of Essential hypertension  (primary encounter diagnosis)  Hyperglycemia  Mixed hyperlipidemia  Elevated lfts  Alcoholism ([facility])  Atherosclerosis of native coronary artery of native heart without angina pectoris   Pt  states they are taking all medications as directed without complaints or side effects   Pt  had labs done prior to today's visit which included Recent Results (from the past 672 hour(s))  -Lipid Panel with Direct LDL reflex  Collection Time: 07/18/18  7:06 AM       Result                      Value             Ref Range           Cholesterol                 153               50 - 200 mg/*       Triglycerides               129               <=150 mg/dL         HDL, Direct                 67 (H)            40 - 60 mg/dL       LDL Calculated              60                0 - 100 mg/dL  -Comprehensive metabolic panel  Collection Time: 07/18/18  7:06 AM       Result                      Value             Ref Range           Sodium                      137               136 - 145 mm*       Potassium                   4 1               3 5 - 5 3 mm*       Chloride                    105               100 - 108 mm*       CO2                         27                21 - 32 mmol*       Anion Gap                   5                 4 - 13 mmol/L       BUN                         13                5 - 25 mg/dL        Creatinine                  0 89              0 60 - 1 30 *       Glucose, Fasting            91                65 - 99 mg/dL       Calcium                     9 3               8 3 - 10 1 m*       AST                         80 (H)            5 - 45 U/L          ALT                         125 (H)           12 - 78 U/L         Alkaline Phosphatase        103               46 - 116 U/L        Total Protein               8 0               6 4 - 8 2 g/*       Albumin                     4 1               3 5 - 5 0 g/*       Total Bilirubin             0 80              0 20 - 1 00 *       eGFR                        98                ml/min/1 73s*  -Hemoglobin A1c  Collection Time: 07/18/18  7:06 AM       Result                      Value             Ref Range           Hemoglobin A1C              5 0               4 2 - 6 3 %         EAG                         97                mg/dl          -PSA  Collection Time: 07/18/18  7:06 AM       Result                      Value             Ref Range           PSA                         0 8               0 0 - 4 0 ng*    Yesterday am started in am with some abd  pain worsened through day, able to work through it  Vomited last evening  Associated with some diarrhea  Pt has lost 25 pounds since his last apt here on purpose altering his diet by eliminating carbs  His new wife also lost weight  He admits to drinking 3-4 cocktails a night and then double at least that every weekend day  The following portions of the patient's history were reviewed and updated as appropriate: allergies, current medications, past family history, past medical history, past social history, past surgical history and problem list     Review of Systems   Constitutional: Negative  HENT: Negative  Eyes: Negative  Respiratory: Negative  Cardiovascular: Negative  Gastrointestinal: Positive for abdominal pain, nausea and vomiting  Endocrine: Negative  Genitourinary: Negative  Musculoskeletal: Negative  Skin: Negative  Allergic/Immunologic: Negative  Neurological: Negative  Hematological: Negative  Psychiatric/Behavioral: Negative  Objective:      Vitals:    07/24/18 1444   BP: 118/64   BP Location: Left arm   Patient Position: Sitting   Pulse: 68   Weight: 86 7 kg (191 lb 3 2 oz)            Physical Exam   Constitutional: He is oriented to person, place, and time  He appears well-developed and well-nourished  No distress  HENT:   Head: Normocephalic and atraumatic  Eyes: Conjunctivae are normal  Right eye exhibits no discharge  Left eye exhibits no discharge  Neck: Carotid bruit is not present     Cardiovascular: Normal rate, regular rhythm and normal heart sounds  Exam reveals no gallop and no friction rub  No murmur heard  Pulmonary/Chest: Effort normal and breath sounds normal  No respiratory distress  He has no wheezes  He has no rales  Abdominal: There is no hepatosplenomegaly  There is no rigidity, no rebound, no guarding and no CVA tenderness  Mild pain w/o guarding or rigidity in all quadrants except RLQ  Normal BS all quadrants  No masses or organomegaly  Neurological: He is alert and oriented to person, place, and time  Skin: Skin is warm and dry  He is not diaphoretic  Psychiatric: He has a normal mood and affect  Judgment normal    Nursing note and vitals reviewed

## 2018-07-24 NOTE — ASSESSMENT & PLAN NOTE
Patient encouraged to decrease alcohol intake to eliminate elevated liver function results  Recheck 3 months and 6 months

## 2018-07-24 NOTE — ASSESSMENT & PLAN NOTE
Patient having symptoms of abdominal discomfort associated with some nausea vomiting diarrhea  I do believe that this is more viral gastroenteritis then exacerbation of diverticulitis at this time  Patient is to use increase fluids brat diet eliminate dairy and caffeine and alcohol until normal bowel movements  He is also however instructed to go to the emergency room if his symptoms worsen and do not improve

## 2018-07-24 NOTE — PATIENT INSTRUCTIONS
Problem List Items Addressed This Visit        Digestive    Viral gastroenteritis     Patient having symptoms of abdominal discomfort associated with some nausea vomiting diarrhea  I do believe that this is more viral gastroenteritis then exacerbation of diverticulitis at this time  Patient is to use increase fluids brat diet eliminate dairy and caffeine and alcohol until normal bowel movements  He is also however instructed to go to the emergency room if his symptoms worsen and do not improve  Relevant Medications    omeprazole (PriLOSEC) 40 MG capsule       Cardiovascular and Mediastinum    Atherosclerotic heart disease of native coronary artery without angina pectoris    Relevant Medications    amLODIPine (NORVASC) 10 mg tablet    Essential hypertension - Primary     Very stable continue current medication  Recheck 6 months  Relevant Medications    amLODIPine (NORVASC) 10 mg tablet    lisinopril (ZESTRIL) 40 mg tablet    Other Relevant Orders    Comprehensive metabolic panel       Other    Alcoholism ([facility] [address] )     Patient encouraged to decrease alcohol intake to eliminate elevated liver function results  Recheck 3 months and 6 months  Relevant Orders    AST    ALT    Elevated LFTs     AST ALT elevated again at 80 and 125  Patient encouraged to decrease alcohol intake to eliminate elevated liver function results  Recheck 3 months and 6 months  Relevant Orders    Comprehensive metabolic panel    AST    ALT    Hyperglycemia     A1c and glucose normal          Relevant Orders    Hemoglobin A1C    Mixed hyperlipidemia     Very stable with an LDL of 60  Continue current medication recheck 6 months           Relevant Medications    rosuvastatin (CRESTOR) 20 MG tablet    Other Relevant Orders    Lipid Panel with Direct LDL reflex      Other Visit Diagnoses     Hypertension, unspecified type        Relevant Medications    amLODIPine (NORVASC) 10 mg tablet    lisinopril (ZESTRIL) 40 mg tablet    Hyperlipidemia, unspecified hyperlipidemia type        Relevant Medications    rosuvastatin (CRESTOR) 20 MG tablet    Gastroesophageal reflux disease without esophagitis        Relevant Medications    omeprazole (PriLOSEC) 40 MG capsule

## 2018-07-31 DIAGNOSIS — K21.9 GASTROESOPHAGEAL REFLUX DISEASE WITHOUT ESOPHAGITIS: ICD-10-CM

## 2018-07-31 DIAGNOSIS — I10 HYPERTENSION, UNSPECIFIED TYPE: ICD-10-CM

## 2018-07-31 DIAGNOSIS — E78.5 HYPERLIPIDEMIA, UNSPECIFIED HYPERLIPIDEMIA TYPE: ICD-10-CM

## 2018-07-31 RX ORDER — LISINOPRIL 40 MG/1
40 TABLET ORAL DAILY
Qty: 90 TABLET | Refills: 1 | Status: SHIPPED | OUTPATIENT
Start: 2018-07-31 | End: 2019-03-22 | Stop reason: SDUPTHER

## 2018-07-31 RX ORDER — AMLODIPINE BESYLATE 10 MG/1
10 TABLET ORAL DAILY
Qty: 90 TABLET | Refills: 1 | Status: SHIPPED | OUTPATIENT
Start: 2018-07-31 | End: 2019-03-22 | Stop reason: SDUPTHER

## 2018-07-31 RX ORDER — OMEPRAZOLE 40 MG/1
40 CAPSULE, DELAYED RELEASE ORAL AS NEEDED
Qty: 90 CAPSULE | Refills: 1 | Status: SHIPPED | OUTPATIENT
Start: 2018-07-31 | End: 2018-09-18

## 2018-07-31 RX ORDER — ROSUVASTATIN CALCIUM 20 MG/1
20 TABLET, COATED ORAL DAILY
Qty: 90 TABLET | Refills: 1 | Status: SHIPPED | OUTPATIENT
Start: 2018-07-31 | End: 2019-03-22 | Stop reason: SDUPTHER

## 2018-09-18 DIAGNOSIS — I10 HYPERTENSION, UNSPECIFIED TYPE: ICD-10-CM

## 2018-09-18 RX ORDER — BISOPROLOL FUMARATE AND HYDROCHLOROTHIAZIDE 5; 6.25 MG/1; MG/1
1 TABLET ORAL DAILY
Qty: 90 TABLET | Refills: 1 | Status: SHIPPED | OUTPATIENT
Start: 2018-09-18 | End: 2019-03-22 | Stop reason: SDUPTHER

## 2019-02-06 ENCOUNTER — APPOINTMENT (OUTPATIENT)
Dept: LAB | Facility: CLINIC | Age: 54
End: 2019-02-06
Payer: COMMERCIAL

## 2019-02-06 DIAGNOSIS — R73.9 HYPERGLYCEMIA: ICD-10-CM

## 2019-02-06 DIAGNOSIS — R79.89 ELEVATED LFTS: ICD-10-CM

## 2019-02-06 DIAGNOSIS — I10 ESSENTIAL HYPERTENSION: ICD-10-CM

## 2019-02-06 DIAGNOSIS — F10.20 ALCOHOLISM (HCC): ICD-10-CM

## 2019-02-06 DIAGNOSIS — E78.2 MIXED HYPERLIPIDEMIA: ICD-10-CM

## 2019-02-06 LAB
ALBUMIN SERPL BCP-MCNC: 3.9 G/DL (ref 3.5–5)
ALP SERPL-CCNC: 104 U/L (ref 46–116)
ALT SERPL W P-5'-P-CCNC: 43 U/L (ref 12–78)
ANION GAP SERPL CALCULATED.3IONS-SCNC: 8 MMOL/L (ref 4–13)
AST SERPL W P-5'-P-CCNC: 36 U/L (ref 5–45)
BILIRUB SERPL-MCNC: 0.65 MG/DL (ref 0.2–1)
BUN SERPL-MCNC: 15 MG/DL (ref 5–25)
CALCIUM SERPL-MCNC: 9.4 MG/DL (ref 8.3–10.1)
CHLORIDE SERPL-SCNC: 104 MMOL/L (ref 100–108)
CHOLEST SERPL-MCNC: 230 MG/DL (ref 50–200)
CO2 SERPL-SCNC: 27 MMOL/L (ref 21–32)
CREAT SERPL-MCNC: 0.91 MG/DL (ref 0.6–1.3)
EST. AVERAGE GLUCOSE BLD GHB EST-MCNC: 108 MG/DL
GFR SERPL CREATININE-BSD FRML MDRD: 96 ML/MIN/1.73SQ M
GLUCOSE P FAST SERPL-MCNC: 100 MG/DL (ref 65–99)
HBA1C MFR BLD: 5.4 % (ref 4.2–6.3)
HDLC SERPL-MCNC: 59 MG/DL (ref 40–60)
LDLC SERPL CALC-MCNC: 123 MG/DL (ref 0–100)
POTASSIUM SERPL-SCNC: 4.1 MMOL/L (ref 3.5–5.3)
PROT SERPL-MCNC: 8 G/DL (ref 6.4–8.2)
SODIUM SERPL-SCNC: 139 MMOL/L (ref 136–145)
TRIGL SERPL-MCNC: 240 MG/DL

## 2019-02-06 PROCEDURE — 80061 LIPID PANEL: CPT

## 2019-02-06 PROCEDURE — 83036 HEMOGLOBIN GLYCOSYLATED A1C: CPT

## 2019-02-06 PROCEDURE — 80053 COMPREHEN METABOLIC PANEL: CPT

## 2019-02-06 PROCEDURE — 36415 COLL VENOUS BLD VENIPUNCTURE: CPT

## 2019-02-19 ENCOUNTER — OFFICE VISIT (OUTPATIENT)
Dept: FAMILY MEDICINE CLINIC | Facility: CLINIC | Age: 54
End: 2019-02-19
Payer: COMMERCIAL

## 2019-02-19 VITALS
SYSTOLIC BLOOD PRESSURE: 122 MMHG | HEART RATE: 68 BPM | DIASTOLIC BLOOD PRESSURE: 82 MMHG | WEIGHT: 206 LBS | BODY MASS INDEX: 29.49 KG/M2 | HEIGHT: 70 IN

## 2019-02-19 DIAGNOSIS — R10.30 LOWER ABDOMINAL PAIN: ICD-10-CM

## 2019-02-19 DIAGNOSIS — M19.049 ARTHRITIS PAIN OF HAND: ICD-10-CM

## 2019-02-19 DIAGNOSIS — K57.92 DIVERTICULITIS: ICD-10-CM

## 2019-02-19 DIAGNOSIS — R79.89 ELEVATED LFTS: ICD-10-CM

## 2019-02-19 DIAGNOSIS — E78.2 MIXED HYPERLIPIDEMIA: ICD-10-CM

## 2019-02-19 DIAGNOSIS — R73.9 HYPERGLYCEMIA: ICD-10-CM

## 2019-02-19 DIAGNOSIS — Z12.5 PROSTATE CANCER SCREENING: ICD-10-CM

## 2019-02-19 DIAGNOSIS — I10 ESSENTIAL HYPERTENSION: Primary | ICD-10-CM

## 2019-02-19 DIAGNOSIS — I25.10 ATHEROSCLEROSIS OF NATIVE CORONARY ARTERY OF NATIVE HEART WITHOUT ANGINA PECTORIS: ICD-10-CM

## 2019-02-19 PROBLEM — A08.4 VIRAL GASTROENTERITIS: Status: RESOLVED | Noted: 2018-07-24 | Resolved: 2019-02-19

## 2019-02-19 PROCEDURE — 3074F SYST BP LT 130 MM HG: CPT | Performed by: PHYSICIAN ASSISTANT

## 2019-02-19 PROCEDURE — 3008F BODY MASS INDEX DOCD: CPT | Performed by: PHYSICIAN ASSISTANT

## 2019-02-19 PROCEDURE — 99214 OFFICE O/P EST MOD 30 MIN: CPT | Performed by: PHYSICIAN ASSISTANT

## 2019-02-19 PROCEDURE — 3079F DIAST BP 80-89 MM HG: CPT | Performed by: PHYSICIAN ASSISTANT

## 2019-02-19 NOTE — ASSESSMENT & PLAN NOTE
Stable with a fasting sugar of 100 hemoglobin A1c of 5 4  Decrease carbohydrate intake and alcohol and recheck in 6 months

## 2019-02-19 NOTE — PATIENT INSTRUCTIONS
Problem List Items Addressed This Visit        Cardiovascular and Mediastinum    Atherosclerotic heart disease of native coronary artery without angina pectoris     Patient should be seeing Cardiology this year 11/2019 with a stress test   His LDL is currently not at goal at 123 Elevated from 66 on Crestor once daily  Essential hypertension - Primary     Stable  Continue current medications  Musculoskeletal and Integument    Arthritis pain of hand     Glucosamine chondroitin, Tumeric, Cumin or combo, celery seed supplement  Other    Elevated LFTs     LFTs currently within normal range  Relevant Orders    Comprehensive metabolic panel    Hyperglycemia     Stable with a fasting sugar of 100 hemoglobin A1c of 5 4  Decrease carbohydrate intake and alcohol and recheck in 6 months  Relevant Orders    Hemoglobin A1C    Comprehensive metabolic panel    Mixed hyperlipidemia     LDL is 123 on Crestor 20 mg  As patient already has heart disease and is status post MI his LDL goal is to be 70 or less  Patient needs to decrease fat and cholesterol intake  I would consider adding Zetia if a repeat lipid panel in 3 months has not improved  Possibility of numbers elevating because of recent cruise  Trigs also elevated to over 200  Relevant Orders    Lipid Panel with Direct LDL reflex    Lipid Panel with Direct LDL reflex    Diverticulitis    Relevant Orders    Ambulatory referral to Gastroenterology    Lower abdominal pain     Pt  with continued pain s/p diverticullitis for the past year  Refer to GI for further evaluation            Relevant Orders    Ambulatory referral to Gastroenterology      Other Visit Diagnoses     Prostate cancer screening        Relevant Orders    PSA, Total Screen

## 2019-02-19 NOTE — ASSESSMENT & PLAN NOTE
LDL is 123 on Crestor 20 mg  As patient already has heart disease and is status post MI his LDL goal is to be 70 or less  Patient needs to decrease fat and cholesterol intake  I would consider adding Zetia if a repeat lipid panel in 3 months has not improved  Possibility of numbers elevating because of recent cruise  Trigs also elevated to over 200

## 2019-02-19 NOTE — PROGRESS NOTES
Assessment and Plan:    Problem List Items Addressed This Visit        Cardiovascular and Mediastinum    Atherosclerotic heart disease of native coronary artery without angina pectoris     Patient should be seeing Cardiology this year 11/2019 with a stress test   His LDL is currently not at goal at 123 Elevated from 66 on Crestor once daily  Essential hypertension - Primary     Stable  Continue current medications  Musculoskeletal and Integument    Arthritis pain of hand     Glucosamine chondroitin, Tumeric, Cumin or combo, celery seed supplement  Other    Elevated LFTs     LFTs currently within normal range  Relevant Orders    Comprehensive metabolic panel    Hyperglycemia     Stable with a fasting sugar of 100 hemoglobin A1c of 5 4  Decrease carbohydrate intake and alcohol and recheck in 6 months  Relevant Orders    Hemoglobin A1C    Comprehensive metabolic panel    Mixed hyperlipidemia     LDL is 123 on Crestor 20 mg  As patient already has heart disease and is status post MI his LDL goal is to be 70 or less  Patient needs to decrease fat and cholesterol intake  I would consider adding Zetia if a repeat lipid panel in 3 months has not improved  Possibility of numbers elevating because of recent cruise  Trigs also elevated to over 200  Relevant Orders    Lipid Panel with Direct LDL reflex    Lipid Panel with Direct LDL reflex    Diverticulitis    Relevant Orders    Ambulatory referral to Gastroenterology    Lower abdominal pain     Pt  with continued pain s/p diverticullitis for the past year  Refer to GI for further evaluation            Relevant Orders    Ambulatory referral to Gastroenterology      Other Visit Diagnoses     Prostate cancer screening        Relevant Orders    PSA, Total Screen             Diagnoses and all orders for this visit:    Essential hypertension    Hyperglycemia  -     Hemoglobin A1C; Future  -     Comprehensive metabolic panel; Future    Mixed hyperlipidemia  -     Lipid Panel with Direct LDL reflex; Future  -     Lipid Panel with Direct LDL reflex; Future    Elevated LFTs  -     Comprehensive metabolic panel; Future    Atherosclerosis of native coronary artery of native heart without angina pectoris    Prostate cancer screening  -     PSA, Total Screen; Future    Arthritis pain of hand    Lower abdominal pain  -     Ambulatory referral to Gastroenterology; Future    Diverticulitis  -     Ambulatory referral to Gastroenterology; Future              Subjective:      Patient ID: Deandre Arriaga is a 48 y o  male  CC:    Chief Complaint   Patient presents with    Follow-up     Follow up and discuss labs  HPI:      Deandre Arriaga is here for chronic conditions f/u including the diagnosis of Essential hypertension  (primary encounter diagnosis)  Hyperglycemia  Mixed hyperlipidemia  Elevated lfts  Atherosclerosis of native coronary artery of native heart without angina pectoris   Pt  states they are taking all medications as directed without complaints or side effects   Pt  had labs done prior to today's visit which included Recent Results (from the past 672 hour(s))  -Comprehensive metabolic panel  Collection Time: 02/06/19  7:02 AM       Result                      Value             Ref Range           Sodium                      139               136 - 145 mm*       Potassium                   4 1               3 5 - 5 3 mm*       Chloride                    104               100 - 108 mm*       CO2                         27                21 - 32 mmol*       ANION GAP                   8                 4 - 13 mmol/L       BUN                         15                5 - 25 mg/dL        Creatinine                  0 91              0 60 - 1 30 *       Glucose, Fasting            100 (H)           65 - 99 mg/dL       Calcium                     9 4               8 3 - 10 1 m*       AST                         36 5 - 45 U/L          ALT                         43                12 - 78 U/L         Alkaline Phosphatase        104               46 - 116 U/L        Total Protein               8 0               6 4 - 8 2 g/*       Albumin                     3 9               3 5 - 5 0 g/*       Total Bilirubin             0 65              0 20 - 1 00 *       eGFR                        96                ml/min/1 73s*  -Hemoglobin A1C  Collection Time: 02/06/19  7:02 AM       Result                      Value             Ref Range           Hemoglobin A1C              5 4               4 2 - 6 3 %         EAG                         108               mg/dl          -Lipid Panel with Direct LDL reflex  Collection Time: 02/06/19  7:02 AM       Result                      Value             Ref Range           Cholesterol                 230 (H)           50 - 200 mg/*       Triglycerides               240 (H)           <=150 mg/dL         HDL, Direct                 59                40 - 60 mg/dL       LDL Calculated              123 (H)           0 - 100 mg/dL    Pt is c/o continued almost daily abdominal pain just below belly button since he was diagnosed with diverticulitis  Only got 80% better from his CT time with diagnosis  Has occasional diarrhea  He last saw Dr Mike Ramey one year ago and is not due for repeat colonoscopy until 2021  Also c/o bilateral thumb pain  Had xrays  The following portions of the patient's history were reviewed and updated as appropriate: allergies, current medications, past family history, past medical history, past social history, past surgical history and problem list       Review of Systems   Constitutional: Negative  HENT: Negative  Eyes: Negative  Respiratory: Negative  Cardiovascular: Negative  Gastrointestinal: Positive for abdominal pain  Endocrine: Negative  Genitourinary: Negative  Musculoskeletal:        Richard  Thumb pain   Skin: Negative  Allergic/Immunologic: Negative  Neurological: Negative  Hematological: Negative  Psychiatric/Behavioral: Negative  Data to review:       Objective:    Vitals:    02/19/19 1812   BP: 122/82   BP Location: Left arm   Pulse: 68   Weight: 93 4 kg (206 lb)   Height: 5' 10" (1 778 m)        Physical Exam   Constitutional: He is oriented to person, place, and time  He appears well-developed and well-nourished  No distress  HENT:   Head: Normocephalic and atraumatic  Eyes: Conjunctivae are normal  Right eye exhibits no discharge  Left eye exhibits no discharge  Neck: Carotid bruit is not present  Cardiovascular: Normal rate, regular rhythm and normal heart sounds  Exam reveals no gallop and no friction rub  No murmur heard  Pulmonary/Chest: Effort normal and breath sounds normal  No respiratory distress  He has no wheezes  He has no rales  Neurological: He is alert and oriented to person, place, and time  Skin: Skin is warm and dry  He is not diaphoretic  Psychiatric: He has a normal mood and affect  Judgment normal    Nursing note and vitals reviewed

## 2019-02-19 NOTE — ASSESSMENT & PLAN NOTE
Patient should be seeing Cardiology this year 11/2019 with a stress test   His LDL is currently not at goal at 123 Elevated from 66 on Crestor once daily

## 2019-03-22 DIAGNOSIS — I10 HYPERTENSION, UNSPECIFIED TYPE: ICD-10-CM

## 2019-03-22 DIAGNOSIS — E78.5 HYPERLIPIDEMIA, UNSPECIFIED HYPERLIPIDEMIA TYPE: ICD-10-CM

## 2019-03-23 RX ORDER — AMLODIPINE BESYLATE 10 MG/1
10 TABLET ORAL DAILY
Qty: 90 TABLET | Refills: 1 | Status: SHIPPED | OUTPATIENT
Start: 2019-03-23 | End: 2019-09-26 | Stop reason: SDUPTHER

## 2019-03-23 RX ORDER — BISOPROLOL FUMARATE AND HYDROCHLOROTHIAZIDE 5; 6.25 MG/1; MG/1
1 TABLET ORAL DAILY
Qty: 90 TABLET | Refills: 1 | Status: SHIPPED | OUTPATIENT
Start: 2019-03-23 | End: 2019-09-26 | Stop reason: SDUPTHER

## 2019-03-23 RX ORDER — ROSUVASTATIN CALCIUM 20 MG/1
20 TABLET, COATED ORAL DAILY
Qty: 90 TABLET | Refills: 1 | Status: SHIPPED | OUTPATIENT
Start: 2019-03-23 | End: 2019-09-26 | Stop reason: SDUPTHER

## 2019-03-23 RX ORDER — LISINOPRIL 40 MG/1
40 TABLET ORAL DAILY
Qty: 90 TABLET | Refills: 1 | Status: SHIPPED | OUTPATIENT
Start: 2019-03-23 | End: 2019-09-26 | Stop reason: SDUPTHER

## 2019-05-20 ENCOUNTER — OFFICE VISIT (OUTPATIENT)
Dept: FAMILY MEDICINE CLINIC | Facility: CLINIC | Age: 54
End: 2019-05-20
Payer: COMMERCIAL

## 2019-05-20 VITALS
HEIGHT: 70 IN | DIASTOLIC BLOOD PRESSURE: 78 MMHG | SYSTOLIC BLOOD PRESSURE: 130 MMHG | HEART RATE: 68 BPM | TEMPERATURE: 99.7 F | BODY MASS INDEX: 30.06 KG/M2 | WEIGHT: 210 LBS

## 2019-05-20 DIAGNOSIS — J01.10 ACUTE NON-RECURRENT FRONTAL SINUSITIS: Primary | ICD-10-CM

## 2019-05-20 PROCEDURE — 99214 OFFICE O/P EST MOD 30 MIN: CPT | Performed by: PHYSICIAN ASSISTANT

## 2019-05-20 RX ORDER — CEFUROXIME AXETIL 500 MG/1
500 TABLET ORAL EVERY 12 HOURS SCHEDULED
Qty: 20 TABLET | Refills: 0 | Status: SHIPPED | OUTPATIENT
Start: 2019-05-20 | End: 2019-05-30

## 2019-05-31 ENCOUNTER — OFFICE VISIT (OUTPATIENT)
Dept: FAMILY MEDICINE CLINIC | Facility: CLINIC | Age: 54
End: 2019-05-31
Payer: COMMERCIAL

## 2019-05-31 VITALS
SYSTOLIC BLOOD PRESSURE: 140 MMHG | BODY MASS INDEX: 28.72 KG/M2 | HEIGHT: 70 IN | WEIGHT: 200.6 LBS | HEART RATE: 64 BPM | DIASTOLIC BLOOD PRESSURE: 82 MMHG

## 2019-05-31 DIAGNOSIS — J01.00 ACUTE NON-RECURRENT MAXILLARY SINUSITIS: Primary | ICD-10-CM

## 2019-05-31 DIAGNOSIS — R73.9 HYPERGLYCEMIA: ICD-10-CM

## 2019-05-31 DIAGNOSIS — I10 ESSENTIAL HYPERTENSION: ICD-10-CM

## 2019-05-31 DIAGNOSIS — J02.9 SORE THROAT: ICD-10-CM

## 2019-05-31 LAB — S PYO AG THROAT QL: NEGATIVE

## 2019-05-31 PROCEDURE — 1036F TOBACCO NON-USER: CPT | Performed by: PHYSICIAN ASSISTANT

## 2019-05-31 PROCEDURE — 3008F BODY MASS INDEX DOCD: CPT | Performed by: PHYSICIAN ASSISTANT

## 2019-05-31 PROCEDURE — 87880 STREP A ASSAY W/OPTIC: CPT | Performed by: PHYSICIAN ASSISTANT

## 2019-05-31 PROCEDURE — 99214 OFFICE O/P EST MOD 30 MIN: CPT | Performed by: PHYSICIAN ASSISTANT

## 2019-05-31 RX ORDER — SULFAMETHOXAZOLE AND TRIMETHOPRIM 800; 160 MG/1; MG/1
1 TABLET ORAL EVERY 12 HOURS SCHEDULED
Qty: 20 TABLET | Refills: 0 | Status: SHIPPED | OUTPATIENT
Start: 2019-05-31 | End: 2019-06-10

## 2019-05-31 RX ORDER — PREDNISONE 10 MG/1
TABLET ORAL
Qty: 21 TABLET | Refills: 0 | Status: SHIPPED | OUTPATIENT
Start: 2019-05-31 | End: 2019-06-06

## 2019-08-20 ENCOUNTER — APPOINTMENT (OUTPATIENT)
Dept: LAB | Facility: CLINIC | Age: 54
End: 2019-08-20
Payer: COMMERCIAL

## 2019-08-20 ENCOUNTER — TRANSCRIBE ORDERS (OUTPATIENT)
Dept: LAB | Facility: CLINIC | Age: 54
End: 2019-08-20

## 2019-08-20 DIAGNOSIS — Z12.5 PROSTATE CANCER SCREENING: ICD-10-CM

## 2019-08-20 DIAGNOSIS — R73.9 HYPERGLYCEMIA: ICD-10-CM

## 2019-08-20 DIAGNOSIS — R79.89 ELEVATED LFTS: ICD-10-CM

## 2019-08-20 DIAGNOSIS — E78.2 MIXED HYPERLIPIDEMIA: ICD-10-CM

## 2019-08-20 LAB
ALBUMIN SERPL BCP-MCNC: 4.2 G/DL (ref 3.5–5)
ALP SERPL-CCNC: 106 U/L (ref 46–116)
ALT SERPL W P-5'-P-CCNC: 58 U/L (ref 12–78)
ANION GAP SERPL CALCULATED.3IONS-SCNC: 12 MMOL/L (ref 4–13)
AST SERPL W P-5'-P-CCNC: 49 U/L (ref 5–45)
BILIRUB SERPL-MCNC: 0.7 MG/DL (ref 0.2–1)
BUN SERPL-MCNC: 11 MG/DL (ref 5–25)
CALCIUM SERPL-MCNC: 9.5 MG/DL (ref 8.3–10.1)
CHLORIDE SERPL-SCNC: 102 MMOL/L (ref 100–108)
CHOLEST SERPL-MCNC: 208 MG/DL (ref 50–200)
CO2 SERPL-SCNC: 27 MMOL/L (ref 21–32)
CREAT SERPL-MCNC: 0.92 MG/DL (ref 0.6–1.3)
EST. AVERAGE GLUCOSE BLD GHB EST-MCNC: 100 MG/DL
GFR SERPL CREATININE-BSD FRML MDRD: 95 ML/MIN/1.73SQ M
GLUCOSE P FAST SERPL-MCNC: 115 MG/DL (ref 65–99)
HBA1C MFR BLD: 5.1 % (ref 4.2–6.3)
HDLC SERPL-MCNC: 73 MG/DL (ref 40–60)
LDLC SERPL CALC-MCNC: 92 MG/DL (ref 0–100)
POTASSIUM SERPL-SCNC: 3.9 MMOL/L (ref 3.5–5.3)
PROT SERPL-MCNC: 8.3 G/DL (ref 6.4–8.2)
PSA SERPL-MCNC: 0.9 NG/ML (ref 0–4)
SODIUM SERPL-SCNC: 141 MMOL/L (ref 136–145)
TRIGL SERPL-MCNC: 216 MG/DL

## 2019-08-20 PROCEDURE — 80053 COMPREHEN METABOLIC PANEL: CPT

## 2019-08-20 PROCEDURE — 80061 LIPID PANEL: CPT

## 2019-08-20 PROCEDURE — G0103 PSA SCREENING: HCPCS

## 2019-08-20 PROCEDURE — 83036 HEMOGLOBIN GLYCOSYLATED A1C: CPT

## 2019-08-20 PROCEDURE — 36415 COLL VENOUS BLD VENIPUNCTURE: CPT

## 2019-08-27 ENCOUNTER — OFFICE VISIT (OUTPATIENT)
Dept: FAMILY MEDICINE CLINIC | Facility: CLINIC | Age: 54
End: 2019-08-27
Payer: COMMERCIAL

## 2019-08-27 VITALS
DIASTOLIC BLOOD PRESSURE: 60 MMHG | HEART RATE: 64 BPM | WEIGHT: 200 LBS | HEIGHT: 71 IN | SYSTOLIC BLOOD PRESSURE: 114 MMHG | BODY MASS INDEX: 28 KG/M2

## 2019-08-27 DIAGNOSIS — E78.2 MIXED HYPERLIPIDEMIA: ICD-10-CM

## 2019-08-27 DIAGNOSIS — K44.9 HIATAL HERNIA: ICD-10-CM

## 2019-08-27 DIAGNOSIS — I10 ESSENTIAL HYPERTENSION: Primary | ICD-10-CM

## 2019-08-27 DIAGNOSIS — R79.89 ELEVATED LFTS: ICD-10-CM

## 2019-08-27 DIAGNOSIS — M19.049 ARTHRITIS PAIN OF HAND: ICD-10-CM

## 2019-08-27 DIAGNOSIS — R73.9 HYPERGLYCEMIA: ICD-10-CM

## 2019-08-27 DIAGNOSIS — L30.9 DERMATITIS: ICD-10-CM

## 2019-08-27 PROBLEM — J01.10 ACUTE NON-RECURRENT FRONTAL SINUSITIS: Status: RESOLVED | Noted: 2019-05-20 | Resolved: 2019-08-27

## 2019-08-27 PROCEDURE — 3074F SYST BP LT 130 MM HG: CPT | Performed by: PHYSICIAN ASSISTANT

## 2019-08-27 PROCEDURE — 3008F BODY MASS INDEX DOCD: CPT | Performed by: PHYSICIAN ASSISTANT

## 2019-08-27 PROCEDURE — 1036F TOBACCO NON-USER: CPT | Performed by: PHYSICIAN ASSISTANT

## 2019-08-27 PROCEDURE — 99214 OFFICE O/P EST MOD 30 MIN: CPT | Performed by: PHYSICIAN ASSISTANT

## 2019-08-27 RX ORDER — MELOXICAM 15 MG/1
15 TABLET ORAL DAILY
Qty: 30 TABLET | Refills: 0 | Status: SHIPPED | OUTPATIENT
Start: 2019-08-27 | End: 2021-03-23 | Stop reason: ALTCHOICE

## 2019-08-27 NOTE — PATIENT INSTRUCTIONS
Problem List Items Addressed This Visit        Digestive    Hiatal hernia       Cardiovascular and Mediastinum    Essential hypertension - Primary       Very well controlled  Continue current medication recheck in 6 months  Musculoskeletal and Integument    Arthritis pain of hand     Glucosamine chondroitin, fish oil, cumin and /or tumeric supplements  May trial Mobic 15 mg once daily as needed  Relevant Medications    meloxicam (MOBIC) 15 mg tablet    Dermatitis     Trial of nystatin-triamcinalone cream BID for 2 weeks  Call if no improvement or not getting better by then  Relevant Medications    nystatin-triamcinolone (MYCOLOG-II) cream       Other    Elevated LFTs       AST slightly elevated at 49 ALT normal at 58  Secondary to alcohol intake  Stop or decrease alcohol intake recheck 6 months  Relevant Orders    Comprehensive metabolic panel    Hyperglycemia     Stable with a fasting sugar 115 and hemoglobin A1c of 5 1  Decrease carbohydrate including alcohol recheck 6 months  Relevant Orders    Hemoglobin A1C    Comprehensive metabolic panel    Mixed hyperlipidemia       Patient on Crestor 20 mg with an LDL of 92  Triglycerides continue to be elevated at 261 likely from alcohol and carbohydrate intake also causing elevated fasting sugars  Decrease carbohydrate intake continue statin therapy recheck 6 months           Relevant Orders    Lipid Panel with Direct LDL reflex

## 2019-08-27 NOTE — ASSESSMENT & PLAN NOTE
Glucosamine chondroitin, fish oil, cumin and /or tumeric supplements  May trial Mobic 15 mg once daily as needed

## 2019-08-27 NOTE — ASSESSMENT & PLAN NOTE
Stable with a fasting sugar 115 and hemoglobin A1c of 5 1  Decrease carbohydrate including alcohol recheck 6 months

## 2019-08-27 NOTE — PROGRESS NOTES
Assessment and Plan:    Problem List Items Addressed This Visit        Digestive    Hiatal hernia       Cardiovascular and Mediastinum    Essential hypertension - Primary       Very well controlled  Continue current medication recheck in 6 months  Musculoskeletal and Integument    Arthritis pain of hand     Glucosamine chondroitin, fish oil, cumin and /or tumeric supplements  May trial Mobic 15 mg once daily as needed  Relevant Medications    meloxicam (MOBIC) 15 mg tablet    Dermatitis     Trial of nystatin-triamcinalone cream BID for 2 weeks  Call if no improvement or not getting better by then  Relevant Medications    nystatin-triamcinolone (MYCOLOG-II) cream       Other    Elevated LFTs       AST slightly elevated at 49 ALT normal at 58  Secondary to alcohol intake  Stop or decrease alcohol intake recheck 6 months  Relevant Orders    Comprehensive metabolic panel    Hyperglycemia     Stable with a fasting sugar 115 and hemoglobin A1c of 5 1  Decrease carbohydrate including alcohol recheck 6 months  Relevant Orders    Hemoglobin A1C    Comprehensive metabolic panel    Mixed hyperlipidemia       Patient on Crestor 20 mg with an LDL of 92  Triglycerides continue to be elevated at 261 likely from alcohol and carbohydrate intake also causing elevated fasting sugars  Decrease carbohydrate intake continue statin therapy recheck 6 months  Relevant Orders    Lipid Panel with Direct LDL reflex                 Diagnoses and all orders for this visit:    Essential hypertension    Hyperglycemia  -     Hemoglobin A1C; Future  -     Comprehensive metabolic panel; Future    Mixed hyperlipidemia  -     Lipid Panel with Direct LDL reflex; Future    Elevated LFTs  -     Comprehensive metabolic panel;  Future    Hiatal hernia    Dermatitis  -     nystatin-triamcinolone (MYCOLOG-II) cream; Apply topically 2 (two) times a day    Arthritis pain of hand  -     meloxicam (MOBIC) 15 mg tablet; Take 1 tablet (15 mg total) by mouth daily for 30 days              Subjective:      Patient ID: Jono Fraga is a 48 y o  male  CC:    Chief Complaint   Patient presents with    Follow-up     patient is here for a 6 month f/u re: chronic medical conditions  Patient needs to review blood work results  Patient c/o ongoing thumb arthritis as well as a red patch on his left shin x 2 weeks  ak       HPI:      Jono Fraga is here for chronic conditions f/u including the diagnosis of Essential hypertension  (primary encounter diagnosis)  Hyperglycemia  Mixed hyperlipidemia  Elevated lfts  Hiatal hernia   Pt  states they are taking all medications as directed without complaints or side effects   Pt  had labs done prior to today's visit which included Recent Results (from the past 672 hour(s))  -Hemoglobin A1C  Collection Time: 08/20/19  7:06 AM       Result                      Value             Ref Range           Hemoglobin A1C              5 1               4 2 - 6 3 %         EAG                         100               mg/dl          -Comprehensive metabolic panel  Collection Time: 08/20/19  7:06 AM       Result                      Value             Ref Range           Sodium                      141               136 - 145 mm*       Potassium                   3 9               3 5 - 5 3 mm*       Chloride                    102               100 - 108 mm*       CO2                         27                21 - 32 mmol*       ANION GAP                   12                4 - 13 mmol/L       BUN                         11                5 - 25 mg/dL        Creatinine                  0 92              0 60 - 1 30 *       Glucose, Fasting            115 (H)           65 - 99 mg/dL       Calcium                     9 5               8 3 - 10 1 m*       AST                         49 (H)            5 - 45 U/L          ALT                         58                12 - 78 U/L Alkaline Phosphatase        106               46 - 116 U/L        Total Protein               8 3 (H)           6 4 - 8 2 g/*       Albumin                     4 2               3 5 - 5 0 g/*       Total Bilirubin             0 70              0 20 - 1 00 *       eGFR                        95                ml/min/1 73s*  -Lipid Panel with Direct LDL reflex  Collection Time: 08/20/19  7:06 AM       Result                      Value             Ref Range           Cholesterol                 208 (H)           50 - 200 mg/*       Triglycerides               216 (H)           <=150 mg/dL         HDL, Direct                 73 (H)            40 - 60 mg/dL       LDL Calculated              92                0 - 100 mg/dL  -PSA, Total Screen  Collection Time: 08/20/19  7:06 AM       Result                      Value             Ref Range           PSA                         0 9               0 0 - 4 0 ng*       patient is complaining of very severe bilateral thumb arthritis pain he does take Aleve sometimes but otherwise he deals with this every day he says it is so severe he may not realize he has arthritis in other different places  He also has a rash that started about 2 weeks ago in getting little bit bigger red spot as big as a quarter on his anterior shin without itching  He states he had the same thing on his inner ankle of the same leg last year around this time but it did itch but finally went away  The following portions of the patient's history were reviewed and updated as appropriate: allergies, current medications, past family history, past medical history, past social history, past surgical history and problem list       Review of Systems   Constitutional: Negative  HENT: Negative  Eyes: Negative  Respiratory: Negative  Cardiovascular: Negative  Gastrointestinal: Negative  Endocrine: Negative  Genitourinary: Negative  Musculoskeletal: Positive for arthralgias     Skin: Positive for rash  Allergic/Immunologic: Negative  Neurological: Negative  Hematological: Negative  Psychiatric/Behavioral: Negative  Data to review:       Objective:    Vitals:    08/27/19 1831   BP: 114/60   Pulse: 64   Weight: 90 7 kg (200 lb)   Height: 5' 11" (1 803 m)        Physical Exam   Constitutional: He is oriented to person, place, and time  He appears well-developed and well-nourished  No distress  HENT:   Head: Normocephalic and atraumatic  Eyes: Conjunctivae are normal  Right eye exhibits no discharge  Left eye exhibits no discharge  Neck: Carotid bruit is not present  Cardiovascular: Normal rate, regular rhythm and normal heart sounds  Exam reveals no gallop and no friction rub  No murmur heard  Pulmonary/Chest: Effort normal and breath sounds normal  No respiratory distress  He has no wheezes  He has no rales  Neurological: He is alert and oriented to person, place, and time  Skin: Skin is warm and dry  He is not diaphoretic  Quarter-sized well-demarcated erythematous slightly blanchable slightly dry sized skin discoloration anterior left shin  Psychiatric: He has a normal mood and affect  Judgment normal    Nursing note and vitals reviewed

## 2019-08-27 NOTE — ASSESSMENT & PLAN NOTE
Trial of nystatin-triamcinalone cream BID for 2 weeks  Call if no improvement or not getting better by then

## 2019-08-27 NOTE — ASSESSMENT & PLAN NOTE
Patient on Crestor 20 mg with an LDL of 92  Triglycerides continue to be elevated at 261 likely from alcohol and carbohydrate intake also causing elevated fasting sugars  Decrease carbohydrate intake continue statin therapy recheck 6 months

## 2019-08-27 NOTE — ASSESSMENT & PLAN NOTE
AST slightly elevated at 49 ALT normal at 58  Secondary to alcohol intake  Stop or decrease alcohol intake recheck 6 months

## 2019-09-26 DIAGNOSIS — I10 HYPERTENSION, UNSPECIFIED TYPE: ICD-10-CM

## 2019-09-26 DIAGNOSIS — E78.5 HYPERLIPIDEMIA, UNSPECIFIED HYPERLIPIDEMIA TYPE: ICD-10-CM

## 2019-09-26 RX ORDER — ROSUVASTATIN CALCIUM 20 MG/1
20 TABLET, COATED ORAL DAILY
Qty: 90 TABLET | Refills: 1 | Status: SHIPPED | OUTPATIENT
Start: 2019-09-26 | End: 2020-03-13 | Stop reason: SDUPTHER

## 2019-09-26 RX ORDER — AMLODIPINE BESYLATE 10 MG/1
10 TABLET ORAL DAILY
Qty: 90 TABLET | Refills: 1 | Status: SHIPPED | OUTPATIENT
Start: 2019-09-26 | End: 2020-03-13 | Stop reason: SDUPTHER

## 2019-09-26 RX ORDER — LISINOPRIL 40 MG/1
40 TABLET ORAL DAILY
Qty: 90 TABLET | Refills: 1 | Status: SHIPPED | OUTPATIENT
Start: 2019-09-26 | End: 2020-03-13 | Stop reason: SDUPTHER

## 2019-09-26 RX ORDER — BISOPROLOL FUMARATE AND HYDROCHLOROTHIAZIDE 5; 6.25 MG/1; MG/1
1 TABLET ORAL DAILY
Qty: 90 TABLET | Refills: 1 | Status: SHIPPED | OUTPATIENT
Start: 2019-09-26 | End: 2020-03-13 | Stop reason: SDUPTHER

## 2020-02-27 ENCOUNTER — APPOINTMENT (OUTPATIENT)
Dept: LAB | Facility: CLINIC | Age: 55
End: 2020-02-27
Payer: COMMERCIAL

## 2020-02-27 DIAGNOSIS — R79.89 ELEVATED LFTS: ICD-10-CM

## 2020-02-27 DIAGNOSIS — E78.2 MIXED HYPERLIPIDEMIA: ICD-10-CM

## 2020-02-27 DIAGNOSIS — R73.9 HYPERGLYCEMIA: ICD-10-CM

## 2020-02-27 LAB
ALBUMIN SERPL BCP-MCNC: 3.8 G/DL (ref 3.5–5)
ALP SERPL-CCNC: 97 U/L (ref 46–116)
ALT SERPL W P-5'-P-CCNC: 39 U/L (ref 12–78)
ANION GAP SERPL CALCULATED.3IONS-SCNC: 7 MMOL/L (ref 4–13)
AST SERPL W P-5'-P-CCNC: 35 U/L (ref 5–45)
BILIRUB SERPL-MCNC: 0.72 MG/DL (ref 0.2–1)
BUN SERPL-MCNC: 11 MG/DL (ref 5–25)
CALCIUM SERPL-MCNC: 9.2 MG/DL (ref 8.3–10.1)
CHLORIDE SERPL-SCNC: 106 MMOL/L (ref 100–108)
CHOLEST SERPL-MCNC: 367 MG/DL (ref 50–200)
CO2 SERPL-SCNC: 26 MMOL/L (ref 21–32)
CREAT SERPL-MCNC: 1 MG/DL (ref 0.6–1.3)
EST. AVERAGE GLUCOSE BLD GHB EST-MCNC: 103 MG/DL
GFR SERPL CREATININE-BSD FRML MDRD: 85 ML/MIN/1.73SQ M
GLUCOSE P FAST SERPL-MCNC: 108 MG/DL (ref 65–99)
HBA1C MFR BLD: 5.2 %
HDLC SERPL-MCNC: 53 MG/DL
LDLC SERPL DIRECT ASSAY-MCNC: 100 MG/DL (ref 0–100)
POTASSIUM SERPL-SCNC: 3.9 MMOL/L (ref 3.5–5.3)
PROT SERPL-MCNC: 7.7 G/DL (ref 6.4–8.2)
SODIUM SERPL-SCNC: 139 MMOL/L (ref 136–145)
TRIGL SERPL-MCNC: 518 MG/DL

## 2020-02-27 PROCEDURE — 80053 COMPREHEN METABOLIC PANEL: CPT

## 2020-02-27 PROCEDURE — 36415 COLL VENOUS BLD VENIPUNCTURE: CPT

## 2020-02-27 PROCEDURE — 83721 ASSAY OF BLOOD LIPOPROTEIN: CPT

## 2020-02-27 PROCEDURE — 83036 HEMOGLOBIN GLYCOSYLATED A1C: CPT

## 2020-02-27 PROCEDURE — 80061 LIPID PANEL: CPT

## 2020-03-10 ENCOUNTER — OFFICE VISIT (OUTPATIENT)
Dept: FAMILY MEDICINE CLINIC | Facility: CLINIC | Age: 55
End: 2020-03-10
Payer: COMMERCIAL

## 2020-03-10 VITALS
DIASTOLIC BLOOD PRESSURE: 76 MMHG | WEIGHT: 209 LBS | HEIGHT: 70 IN | BODY MASS INDEX: 29.92 KG/M2 | SYSTOLIC BLOOD PRESSURE: 118 MMHG | HEART RATE: 76 BPM

## 2020-03-10 DIAGNOSIS — Z12.5 PROSTATE CANCER SCREENING: ICD-10-CM

## 2020-03-10 DIAGNOSIS — E78.2 MIXED HYPERLIPIDEMIA: Primary | ICD-10-CM

## 2020-03-10 DIAGNOSIS — R79.89 ELEVATED LFTS: ICD-10-CM

## 2020-03-10 DIAGNOSIS — I10 ESSENTIAL HYPERTENSION: ICD-10-CM

## 2020-03-10 DIAGNOSIS — R73.9 HYPERGLYCEMIA: ICD-10-CM

## 2020-03-10 DIAGNOSIS — I25.10 ATHEROSCLEROSIS OF NATIVE CORONARY ARTERY OF NATIVE HEART WITHOUT ANGINA PECTORIS: ICD-10-CM

## 2020-03-10 DIAGNOSIS — R10.13 EPIGASTRIC PAIN: ICD-10-CM

## 2020-03-10 PROCEDURE — 3008F BODY MASS INDEX DOCD: CPT | Performed by: PHYSICIAN ASSISTANT

## 2020-03-10 PROCEDURE — 3074F SYST BP LT 130 MM HG: CPT | Performed by: PHYSICIAN ASSISTANT

## 2020-03-10 PROCEDURE — 1036F TOBACCO NON-USER: CPT | Performed by: PHYSICIAN ASSISTANT

## 2020-03-10 PROCEDURE — 99214 OFFICE O/P EST MOD 30 MIN: CPT | Performed by: PHYSICIAN ASSISTANT

## 2020-03-10 PROCEDURE — 3078F DIAST BP <80 MM HG: CPT | Performed by: PHYSICIAN ASSISTANT

## 2020-03-10 RX ORDER — FENOFIBRATE 48 MG/1
48 TABLET, COATED ORAL DAILY
Qty: 90 TABLET | Refills: 3 | Status: CANCELLED | OUTPATIENT
Start: 2020-03-10

## 2020-03-10 NOTE — PATIENT INSTRUCTIONS
Problem List Items Addressed This Visit        Cardiovascular and Mediastinum    Atherosclerotic heart disease of native coronary artery without angina pectoris     It does look like patient is significantly overdue for a cardio appointment  Order placed  This is highly encouraged as he has a history of 1 stent  Relevant Orders    Ambulatory referral to Cardiology    Essential hypertension     Stable continue current medication  Other    Elevated LFTs     LFTs normal this round  Hyperglycemia     Hemoglobin A1c stable at 5 2 with a fasting sugar of 108  Decrease alcohol and simple carbohydrates check with next labs  Relevant Orders    Hemoglobin A1C    Comprehensive metabolic panel    Mixed hyperlipidemia - Primary     Patient's triglycerides are now 518  This puts him at significant risk for spontaneous pancreatitis and therefore fenofibrate would normally be added to patient's regimen  Patient however would like to work on decreasing his simple carbohydrate intake as he states for example he has been eating a lot of bagels lately and then we would recheck in 3 months  LDL doing well at 100  Continue Crestor  If triglycerides continue to be elevated I will add fenofibrate at the next visit  Relevant Orders    Lipid Panel with Direct LDL reflex    Epigastric pain     Intermittent and pt was seeing GI  We are wondering if his elevation in trigs may be causing pain in his pancrease so I am giving him orders to check amylase and lipase when he is having his symptoms            Relevant Orders    Lipase    Amylase    Comprehensive metabolic panel      Other Visit Diagnoses     Prostate cancer screening

## 2020-03-10 NOTE — PROGRESS NOTES
Assessment and Plan:    Problem List Items Addressed This Visit        Cardiovascular and Mediastinum    Atherosclerotic heart disease of native coronary artery without angina pectoris     It does look like patient is significantly overdue for a cardio appointment  Order placed  This is highly encouraged as he has a history of 1 stent  Relevant Orders    Ambulatory referral to Cardiology    Essential hypertension     Stable continue current medication  Other    Elevated LFTs     LFTs normal this round  Hyperglycemia     Hemoglobin A1c stable at 5 2 with a fasting sugar of 108  Decrease alcohol and simple carbohydrates check with next labs  Relevant Orders    Hemoglobin A1C    Comprehensive metabolic panel    Mixed hyperlipidemia - Primary     Patient's triglycerides are now 518  This puts him at significant risk for spontaneous pancreatitis and therefore fenofibrate would normally be added to patient's regimen  Patient however would like to work on decreasing his simple carbohydrate intake as he states for example he has been eating a lot of bagels lately and then we would recheck in 3 months  LDL doing well at 100  Continue Crestor  If triglycerides continue to be elevated I will add fenofibrate at the next visit  Relevant Orders    Lipid Panel with Direct LDL reflex    Epigastric pain     Intermittent and pt was seeing GI  We are wondering if his elevation in trigs may be causing pain in his pancrease so I am giving him orders to check amylase and lipase when he is having his symptoms  Relevant Orders    Lipase    Amylase    Comprehensive metabolic panel      Other Visit Diagnoses     Prostate cancer screening                     Diagnoses and all orders for this visit:    Mixed hyperlipidemia  -     Lipid Panel with Direct LDL reflex; Future    Hyperglycemia  -     Hemoglobin A1C; Future  -     Comprehensive metabolic panel;  Future    Elevated LFTs    Essential hypertension    Prostate cancer screening  -     Cancel: PSA, Total Screen; Future    Atherosclerosis of native coronary artery of native heart without angina pectoris  -     Ambulatory referral to Cardiology; Future    Epigastric pain  -     Lipase; Future  -     Amylase; Future  -     Comprehensive metabolic panel; Future    Other orders  -     Cancel: fenofibrate (TRICOR) 48 mg tablet; Take 1 tablet (48 mg total) by mouth daily              Subjective:      Patient ID: Neymar Roth is a 47 y o  male  CC:    Chief Complaint   Patient presents with    Follow-up     patient is here for a 6 month f/u re: chronic medical conditions  patient needs to review blood work results  ak       HPI:      Neymar Roth is here for chronic conditions f/u including the diagnosis of Mixed hyperlipidemia  (primary encounter diagnosis)  Hyperglycemia  Elevated lfts  Essential hypertension  Prostate cancer screening  Atherosclerosis of native coronary artery of native heart without angina pectoris   Pt  states they are taking all medications as directed without complaints or side effects   Pt  had labs done prior to today's visit which included Recent Results (from the past 672 hour(s))  -Lipid Panel with Direct LDL reflex  Collection Time: 02/27/20  8:25 AM       Result                      Value             Ref Range           Cholesterol                 367 (H)           50 - 200 mg/*       Triglycerides               518 (H)           <=150 mg/dL         HDL, Direct                 53                >=40 mg/dL          LDL Calculated                                               -Hemoglobin A1C  Collection Time: 02/27/20  8:25 AM       Result                      Value             Ref Range           Hemoglobin A1C              5 2               Normal 3 8-5*       EAG                         103               mg/dl          -Comprehensive metabolic panel  Collection Time: 02/27/20  8:25 AM Result                      Value             Ref Range           Sodium                      139               136 - 145 mm*       Potassium                   3 9               3 5 - 5 3 mm*       Chloride                    106               100 - 108 mm*       CO2                         26                21 - 32 mmol*       ANION GAP                   7                 4 - 13 mmol/L       BUN                         11                5 - 25 mg/dL        Creatinine                  1 00              0 60 - 1 30 *       Glucose, Fasting            108 (H)           65 - 99 mg/dL       Calcium                     9 2               8 3 - 10 1 m*       AST                         35                5 - 45 U/L          ALT                         39                12 - 78 U/L         Alkaline Phosphatase        97                46 - 116 U/L        Total Protein               7 7               6 4 - 8 2 g/*       Albumin                     3 8               3 5 - 5 0 g/*       Total Bilirubin             0 72              0 20 - 1 00 *       eGFR                        85                ml/min/1 73s*  -LDL cholesterol, direct  Collection Time: 02/27/20  8:25 AM       Result                      Value             Ref Range           LDL Direct                  100               0 - 100 mg/dl            The following portions of the patient's history were reviewed and updated as appropriate: allergies, current medications, past family history, past medical history, past social history, past surgical history and problem list       Review of Systems   Constitutional: Negative  HENT: Negative  Eyes: Negative  Respiratory: Negative  Cardiovascular: Negative  Gastrointestinal: Negative  Endocrine: Negative  Genitourinary: Negative  Musculoskeletal: Negative  Skin: Negative  Allergic/Immunologic: Negative  Neurological: Negative  Hematological: Negative  Psychiatric/Behavioral: Negative  Data to review:       Objective:    Vitals:    03/10/20 1830   BP: 118/76   Pulse: 76   Weight: 94 8 kg (209 lb)   Height: 5' 10" (1 778 m)        Physical Exam   Constitutional: He is oriented to person, place, and time  He appears well-developed and well-nourished  No distress  HENT:   Head: Normocephalic and atraumatic  Eyes: Conjunctivae are normal  Right eye exhibits no discharge  Left eye exhibits no discharge  Neck: Carotid bruit is not present  Cardiovascular: Normal rate, regular rhythm and normal heart sounds  Exam reveals no gallop and no friction rub  No murmur heard  Pulmonary/Chest: Effort normal and breath sounds normal  No respiratory distress  He has no wheezes  He has no rales  Neurological: He is alert and oriented to person, place, and time  Skin: Skin is warm and dry  He is not diaphoretic  Psychiatric: He has a normal mood and affect  Judgment normal    Nursing note and vitals reviewed  BMI Counseling: Body mass index is 29 99 kg/m²  The BMI is above normal  Nutrition recommendations include decreasing portion sizes, encouraging healthy choices of fruits and vegetables, decreasing fast food intake, consuming healthier snacks, limiting drinks that contain sugar, moderation in carbohydrate intake, increasing intake of lean protein, reducing intake of saturated and trans fat and reducing intake of cholesterol  Exercise recommendations include exercising 3-5 times per week  No pharmacotherapy was ordered

## 2020-03-10 NOTE — ASSESSMENT & PLAN NOTE
Patient's triglycerides are now 518  This puts him at significant risk for spontaneous pancreatitis and therefore fenofibrate would normally be added to patient's regimen  Patient however would like to work on decreasing his simple carbohydrate intake as he states for example he has been eating a lot of bagels lately and then we would recheck in 3 months  LDL doing well at 100  Continue Crestor  If triglycerides continue to be elevated I will add fenofibrate at the next visit

## 2020-03-10 NOTE — ASSESSMENT & PLAN NOTE
It does look like patient is significantly overdue for a cardio appointment  Order placed  This is highly encouraged as he has a history of 1 stent

## 2020-03-10 NOTE — ASSESSMENT & PLAN NOTE
Hemoglobin A1c stable at 5 2 with a fasting sugar of 108  Decrease alcohol and simple carbohydrates check with next labs

## 2020-03-10 NOTE — ASSESSMENT & PLAN NOTE
Intermittent and pt was seeing GI  We are wondering if his elevation in trigs may be causing pain in his pancrease so I am giving him orders to check amylase and lipase when he is having his symptoms

## 2020-03-13 DIAGNOSIS — I10 HYPERTENSION, UNSPECIFIED TYPE: ICD-10-CM

## 2020-03-13 DIAGNOSIS — E78.5 HYPERLIPIDEMIA, UNSPECIFIED HYPERLIPIDEMIA TYPE: ICD-10-CM

## 2020-03-13 RX ORDER — BISOPROLOL FUMARATE AND HYDROCHLOROTHIAZIDE 5; 6.25 MG/1; MG/1
1 TABLET ORAL DAILY
Qty: 90 TABLET | Refills: 1 | Status: SHIPPED | OUTPATIENT
Start: 2020-03-13 | End: 2021-03-23 | Stop reason: SDUPTHER

## 2020-03-13 RX ORDER — AMLODIPINE BESYLATE 10 MG/1
10 TABLET ORAL DAILY
Qty: 90 TABLET | Refills: 1 | Status: SHIPPED | OUTPATIENT
Start: 2020-03-13 | End: 2020-11-04 | Stop reason: SDUPTHER

## 2020-03-13 RX ORDER — LISINOPRIL 40 MG/1
40 TABLET ORAL DAILY
Qty: 90 TABLET | Refills: 1 | Status: SHIPPED | OUTPATIENT
Start: 2020-03-13 | End: 2020-11-04 | Stop reason: SDUPTHER

## 2020-03-13 RX ORDER — ROSUVASTATIN CALCIUM 20 MG/1
20 TABLET, COATED ORAL DAILY
Qty: 90 TABLET | Refills: 1 | Status: SHIPPED | OUTPATIENT
Start: 2020-03-13 | End: 2021-05-24 | Stop reason: SDUPTHER

## 2020-04-06 DIAGNOSIS — K21.9 GASTROESOPHAGEAL REFLUX DISEASE WITHOUT ESOPHAGITIS: Primary | ICD-10-CM

## 2020-04-06 RX ORDER — OMEPRAZOLE 40 MG/1
40 CAPSULE, DELAYED RELEASE ORAL AS NEEDED
Qty: 90 CAPSULE | Refills: 1 | Status: SHIPPED | OUTPATIENT
Start: 2020-04-06 | End: 2020-04-07 | Stop reason: SDUPTHER

## 2020-04-06 RX ORDER — OMEPRAZOLE 40 MG/1
40 CAPSULE, DELAYED RELEASE ORAL AS NEEDED
COMMUNITY
End: 2020-04-06 | Stop reason: SDUPTHER

## 2020-04-07 DIAGNOSIS — K21.9 GASTROESOPHAGEAL REFLUX DISEASE WITHOUT ESOPHAGITIS: ICD-10-CM

## 2020-04-07 RX ORDER — OMEPRAZOLE 40 MG/1
40 CAPSULE, DELAYED RELEASE ORAL AS NEEDED
Qty: 90 CAPSULE | Refills: 1 | Status: SHIPPED | OUTPATIENT
Start: 2020-04-07 | End: 2020-12-08 | Stop reason: SDUPTHER

## 2020-10-29 ENCOUNTER — TRANSCRIBE ORDERS (OUTPATIENT)
Dept: LAB | Facility: CLINIC | Age: 55
End: 2020-10-29

## 2020-10-29 ENCOUNTER — LAB (OUTPATIENT)
Dept: LAB | Facility: CLINIC | Age: 55
End: 2020-10-29
Payer: COMMERCIAL

## 2020-10-29 DIAGNOSIS — R10.13 EPIGASTRIC PAIN: ICD-10-CM

## 2020-10-29 DIAGNOSIS — Z12.5 SPECIAL SCREENING FOR MALIGNANT NEOPLASM OF PROSTATE: ICD-10-CM

## 2020-10-29 DIAGNOSIS — Z12.5 SPECIAL SCREENING FOR MALIGNANT NEOPLASM OF PROSTATE: Primary | ICD-10-CM

## 2020-10-29 LAB
ALBUMIN SERPL BCP-MCNC: 4.1 G/DL (ref 3.5–5)
ALP SERPL-CCNC: 108 U/L (ref 46–116)
ALT SERPL W P-5'-P-CCNC: 107 U/L (ref 12–78)
AMYLASE SERPL-CCNC: 54 IU/L (ref 25–115)
ANION GAP SERPL CALCULATED.3IONS-SCNC: 7 MMOL/L (ref 4–13)
AST SERPL W P-5'-P-CCNC: 73 U/L (ref 5–45)
BILIRUB SERPL-MCNC: 1.07 MG/DL (ref 0.2–1)
BUN SERPL-MCNC: 8 MG/DL (ref 5–25)
CALCIUM SERPL-MCNC: 9.2 MG/DL (ref 8.3–10.1)
CHLORIDE SERPL-SCNC: 100 MMOL/L (ref 100–108)
CO2 SERPL-SCNC: 28 MMOL/L (ref 21–32)
CREAT SERPL-MCNC: 0.97 MG/DL (ref 0.6–1.3)
GFR SERPL CREATININE-BSD FRML MDRD: 88 ML/MIN/1.73SQ M
GLUCOSE P FAST SERPL-MCNC: 107 MG/DL (ref 65–99)
LIPASE SERPL-CCNC: 171 U/L (ref 73–393)
POTASSIUM SERPL-SCNC: 4.1 MMOL/L (ref 3.5–5.3)
PROT SERPL-MCNC: 8.3 G/DL (ref 6.4–8.2)
PSA SERPL-MCNC: 1.1 NG/ML (ref 0–4)
SODIUM SERPL-SCNC: 135 MMOL/L (ref 136–145)

## 2020-10-29 PROCEDURE — 82150 ASSAY OF AMYLASE: CPT

## 2020-10-29 PROCEDURE — 36415 COLL VENOUS BLD VENIPUNCTURE: CPT

## 2020-10-29 PROCEDURE — 83690 ASSAY OF LIPASE: CPT

## 2020-10-29 PROCEDURE — G0103 PSA SCREENING: HCPCS

## 2020-10-29 PROCEDURE — 80053 COMPREHEN METABOLIC PANEL: CPT

## 2020-11-04 ENCOUNTER — OFFICE VISIT (OUTPATIENT)
Dept: FAMILY MEDICINE CLINIC | Facility: CLINIC | Age: 55
End: 2020-11-04
Payer: COMMERCIAL

## 2020-11-04 VITALS
WEIGHT: 215.4 LBS | SYSTOLIC BLOOD PRESSURE: 150 MMHG | DIASTOLIC BLOOD PRESSURE: 100 MMHG | HEIGHT: 71 IN | TEMPERATURE: 98.4 F | HEART RATE: 80 BPM | BODY MASS INDEX: 30.15 KG/M2

## 2020-11-04 DIAGNOSIS — R79.89 ELEVATED LFTS: ICD-10-CM

## 2020-11-04 DIAGNOSIS — I25.10 ATHEROSCLEROSIS OF NATIVE CORONARY ARTERY OF NATIVE HEART WITHOUT ANGINA PECTORIS: ICD-10-CM

## 2020-11-04 DIAGNOSIS — R73.9 HYPERGLYCEMIA: ICD-10-CM

## 2020-11-04 DIAGNOSIS — I10 HYPERTENSION, UNSPECIFIED TYPE: ICD-10-CM

## 2020-11-04 DIAGNOSIS — E78.2 MIXED HYPERLIPIDEMIA: ICD-10-CM

## 2020-11-04 DIAGNOSIS — I10 ESSENTIAL HYPERTENSION: Primary | ICD-10-CM

## 2020-11-04 DIAGNOSIS — Z11.4 SCREENING FOR HIV (HUMAN IMMUNODEFICIENCY VIRUS): ICD-10-CM

## 2020-11-04 LAB — SL AMB POCT HEMOGLOBIN AIC: 4.8 (ref ?–6.5)

## 2020-11-04 PROCEDURE — 99214 OFFICE O/P EST MOD 30 MIN: CPT | Performed by: PHYSICIAN ASSISTANT

## 2020-11-04 PROCEDURE — 3080F DIAST BP >= 90 MM HG: CPT | Performed by: PHYSICIAN ASSISTANT

## 2020-11-04 PROCEDURE — 3008F BODY MASS INDEX DOCD: CPT | Performed by: PHYSICIAN ASSISTANT

## 2020-11-04 PROCEDURE — 3077F SYST BP >= 140 MM HG: CPT | Performed by: PHYSICIAN ASSISTANT

## 2020-11-04 PROCEDURE — 3725F SCREEN DEPRESSION PERFORMED: CPT | Performed by: PHYSICIAN ASSISTANT

## 2020-11-04 PROCEDURE — 83036 HEMOGLOBIN GLYCOSYLATED A1C: CPT | Performed by: PHYSICIAN ASSISTANT

## 2020-11-04 RX ORDER — AMLODIPINE BESYLATE 10 MG/1
10 TABLET ORAL DAILY
Qty: 90 TABLET | Refills: 1 | Status: SHIPPED | OUTPATIENT
Start: 2020-11-04 | End: 2021-03-23 | Stop reason: SDUPTHER

## 2020-11-04 RX ORDER — LISINOPRIL 40 MG/1
40 TABLET ORAL DAILY
Qty: 90 TABLET | Refills: 1 | Status: SHIPPED | OUTPATIENT
Start: 2020-11-04 | End: 2021-03-23 | Stop reason: SDUPTHER

## 2020-12-08 DIAGNOSIS — K21.9 GASTROESOPHAGEAL REFLUX DISEASE WITHOUT ESOPHAGITIS: ICD-10-CM

## 2020-12-08 RX ORDER — OMEPRAZOLE 40 MG/1
40 CAPSULE, DELAYED RELEASE ORAL AS NEEDED
Qty: 90 CAPSULE | Refills: 1 | Status: SHIPPED | OUTPATIENT
Start: 2020-12-08 | End: 2021-03-23

## 2020-12-08 RX ORDER — OMEPRAZOLE 40 MG/1
40 CAPSULE, DELAYED RELEASE ORAL
Qty: 30 CAPSULE | Refills: 0 | Status: SHIPPED | OUTPATIENT
Start: 2020-12-08 | End: 2021-03-23 | Stop reason: SDUPTHER

## 2021-02-10 ENCOUNTER — APPOINTMENT (OUTPATIENT)
Dept: LAB | Facility: HOSPITAL | Age: 56
End: 2021-02-10
Payer: COMMERCIAL

## 2021-02-10 DIAGNOSIS — R73.9 HYPERGLYCEMIA: ICD-10-CM

## 2021-02-10 DIAGNOSIS — Z11.4 SCREENING FOR HIV (HUMAN IMMUNODEFICIENCY VIRUS): ICD-10-CM

## 2021-02-10 DIAGNOSIS — E78.2 MIXED HYPERLIPIDEMIA: ICD-10-CM

## 2021-02-10 LAB
ALBUMIN SERPL BCP-MCNC: 3.6 G/DL (ref 3.5–5)
ALP SERPL-CCNC: 128 U/L (ref 46–116)
ALT SERPL W P-5'-P-CCNC: 146 U/L (ref 12–78)
ANION GAP SERPL CALCULATED.3IONS-SCNC: 11 MMOL/L (ref 4–13)
AST SERPL W P-5'-P-CCNC: 126 U/L (ref 5–45)
BILIRUB SERPL-MCNC: 1.1 MG/DL (ref 0.2–1)
BUN SERPL-MCNC: 14 MG/DL (ref 5–25)
CALCIUM SERPL-MCNC: 8.9 MG/DL (ref 8.3–10.1)
CHLORIDE SERPL-SCNC: 103 MMOL/L (ref 100–108)
CHOLEST SERPL-MCNC: 253 MG/DL (ref 50–200)
CO2 SERPL-SCNC: 27 MMOL/L (ref 21–32)
CREAT SERPL-MCNC: 0.95 MG/DL (ref 0.6–1.3)
EST. AVERAGE GLUCOSE BLD GHB EST-MCNC: 105 MG/DL
GFR SERPL CREATININE-BSD FRML MDRD: 90 ML/MIN/1.73SQ M
GLUCOSE SERPL-MCNC: 122 MG/DL (ref 65–140)
HBA1C MFR BLD: 5.3 %
HDLC SERPL-MCNC: 70 MG/DL
LDLC SERPL CALC-MCNC: 155 MG/DL (ref 0–100)
POTASSIUM SERPL-SCNC: 3.8 MMOL/L (ref 3.5–5.3)
PROT SERPL-MCNC: 8.2 G/DL (ref 6.4–8.2)
SODIUM SERPL-SCNC: 141 MMOL/L (ref 136–145)
TRIGL SERPL-MCNC: 138 MG/DL

## 2021-02-10 PROCEDURE — 80053 COMPREHEN METABOLIC PANEL: CPT

## 2021-02-10 PROCEDURE — 83036 HEMOGLOBIN GLYCOSYLATED A1C: CPT

## 2021-02-10 PROCEDURE — 80061 LIPID PANEL: CPT

## 2021-02-10 PROCEDURE — 36415 COLL VENOUS BLD VENIPUNCTURE: CPT

## 2021-02-10 PROCEDURE — 87389 HIV-1 AG W/HIV-1&-2 AB AG IA: CPT

## 2021-02-11 LAB — HIV 1+2 AB+HIV1 P24 AG SERPL QL IA: NORMAL

## 2021-02-23 PROBLEM — K57.90 DIVERTICULOSIS: Status: ACTIVE | Noted: 2018-01-22

## 2021-02-23 PROBLEM — K70.0 FATTY LIVER, ALCOHOLIC: Status: ACTIVE | Noted: 2021-02-23

## 2021-03-23 ENCOUNTER — OFFICE VISIT (OUTPATIENT)
Dept: FAMILY MEDICINE CLINIC | Facility: CLINIC | Age: 56
End: 2021-03-23
Payer: COMMERCIAL

## 2021-03-23 VITALS
WEIGHT: 213.8 LBS | HEART RATE: 84 BPM | SYSTOLIC BLOOD PRESSURE: 130 MMHG | BODY MASS INDEX: 29.93 KG/M2 | HEIGHT: 71 IN | DIASTOLIC BLOOD PRESSURE: 76 MMHG

## 2021-03-23 DIAGNOSIS — I10 HYPERTENSION, UNSPECIFIED TYPE: ICD-10-CM

## 2021-03-23 DIAGNOSIS — M19.049 ARTHRITIS PAIN OF HAND: ICD-10-CM

## 2021-03-23 DIAGNOSIS — R73.9 HYPERGLYCEMIA: ICD-10-CM

## 2021-03-23 DIAGNOSIS — R79.89 ELEVATED LFTS: ICD-10-CM

## 2021-03-23 DIAGNOSIS — I10 ESSENTIAL HYPERTENSION: ICD-10-CM

## 2021-03-23 DIAGNOSIS — D12.6 TUBULAR ADENOMA OF COLON: ICD-10-CM

## 2021-03-23 DIAGNOSIS — F10.20 ALCOHOLISM (HCC): ICD-10-CM

## 2021-03-23 DIAGNOSIS — K70.0 FATTY LIVER, ALCOHOLIC: ICD-10-CM

## 2021-03-23 DIAGNOSIS — I25.10 ATHEROSCLEROSIS OF NATIVE CORONARY ARTERY OF NATIVE HEART WITHOUT ANGINA PECTORIS: Primary | ICD-10-CM

## 2021-03-23 DIAGNOSIS — E78.2 MIXED HYPERLIPIDEMIA: ICD-10-CM

## 2021-03-23 DIAGNOSIS — K21.9 GASTROESOPHAGEAL REFLUX DISEASE WITHOUT ESOPHAGITIS: ICD-10-CM

## 2021-03-23 PROCEDURE — 3008F BODY MASS INDEX DOCD: CPT | Performed by: PHYSICIAN ASSISTANT

## 2021-03-23 PROCEDURE — 3075F SYST BP GE 130 - 139MM HG: CPT | Performed by: PHYSICIAN ASSISTANT

## 2021-03-23 PROCEDURE — 1036F TOBACCO NON-USER: CPT | Performed by: PHYSICIAN ASSISTANT

## 2021-03-23 PROCEDURE — 99214 OFFICE O/P EST MOD 30 MIN: CPT | Performed by: PHYSICIAN ASSISTANT

## 2021-03-23 PROCEDURE — 3078F DIAST BP <80 MM HG: CPT | Performed by: PHYSICIAN ASSISTANT

## 2021-03-23 RX ORDER — BISOPROLOL FUMARATE AND HYDROCHLOROTHIAZIDE 5; 6.25 MG/1; MG/1
1 TABLET ORAL DAILY
Qty: 90 TABLET | Refills: 1 | Status: SHIPPED | OUTPATIENT
Start: 2021-03-23 | End: 2021-10-15 | Stop reason: SDUPTHER

## 2021-03-23 RX ORDER — AMLODIPINE BESYLATE 10 MG/1
10 TABLET ORAL DAILY
Qty: 90 TABLET | Refills: 1 | Status: SHIPPED | OUTPATIENT
Start: 2021-03-23 | End: 2021-10-15 | Stop reason: SDUPTHER

## 2021-03-23 RX ORDER — LISINOPRIL 40 MG/1
40 TABLET ORAL DAILY
Qty: 90 TABLET | Refills: 1 | Status: SHIPPED | OUTPATIENT
Start: 2021-03-23 | End: 2021-10-15 | Stop reason: SDUPTHER

## 2021-03-23 RX ORDER — OMEPRAZOLE 40 MG/1
40 CAPSULE, DELAYED RELEASE ORAL
Qty: 30 CAPSULE | Refills: 0 | Status: SHIPPED | OUTPATIENT
Start: 2021-03-23 | End: 2021-10-15 | Stop reason: SDUPTHER

## 2021-03-23 NOTE — ASSESSMENT & PLAN NOTE
Patient is prediabetic with a fasting sugar of 122 and a hemoglobin A1c of 5 3  Recheck 6 months  Pt needs to cut down on alcohol and simple carbs like bread, pasta, potatoes, and rice

## 2021-03-23 NOTE — PATIENT INSTRUCTIONS
Problem List Items Addressed This Visit        Digestive    Fatty liver, alcoholic       Liver function tests extremely elevated at 126 and 146 respectively for AST an ALT  Patient highly encouraged to decrease her  Alcoholic consumption  Recheck with next labs  Tubular adenoma of colon     Per GI notes pt is due for colonoscopy this year  Order placed  Relevant Orders    Ambulatory referral to Gastroenterology       Cardiovascular and Mediastinum    Atherosclerotic heart disease of native coronary artery without angina pectoris - Primary      Patient still needs to go to Cardiology for follow-up  Order again placed  Relevant Medications    amLODIPine (NORVASC) 10 mg tablet    Other Relevant Orders    Ambulatory referral to Cardiology    Essential hypertension       Stable continue current medication  Relevant Medications    amLODIPine (NORVASC) 10 mg tablet    bisoprolol-hydrochlorothiazide (ZIAC) 5-6 25 MG per tablet    lisinopril (ZESTRIL) 40 mg tablet       Musculoskeletal and Integument    Arthritis pain of hand     Refer to hand specialist  Alfie Bryan without help in the past  Thumbs really giving him pain and he would like to try injections if possible  Relevant Orders    Ambulatory referral to Hand Surgery       Other    Alcoholism ([address] )    Elevated LFTs       AST ALT significantly elevated 126 and 146 respectively  Decrease alcohol or stop  Recheck with next labs  Relevant Orders    Comprehensive metabolic panel    Hyperglycemia       Patient is prediabetic with a fasting sugar of 122 and a hemoglobin A1c of 5 3  Recheck 6 months  Pt needs to cut down on alcohol and simple carbs like bread, pasta, potatoes, and rice            Relevant Orders    Hemoglobin A1C    Comprehensive metabolic panel    Mixed hyperlipidemia       Patient  Has not been taking his statin medication Crestor 20 mg and his LDL has elevated from  which is not acceptable for somebody who has CAD and is status post stent  Pt would like to wait until he sees cardio to start any meds again for lipids  Order placed            Relevant Orders    Lipid Panel with Direct LDL reflex      Other Visit Diagnoses     Hypertension, unspecified type        Relevant Medications    amLODIPine (NORVASC) 10 mg tablet    bisoprolol-hydrochlorothiazide (ZIAC) 5-6 25 MG per tablet    lisinopril (ZESTRIL) 40 mg tablet    Gastroesophageal reflux disease without esophagitis        Relevant Medications    omeprazole (PriLOSEC) 40 MG capsule

## 2021-03-23 NOTE — ASSESSMENT & PLAN NOTE
Patient  Has not been taking his statin medication Crestor 20 mg and his LDL has elevated from  which is not acceptable for somebody who has CAD and is status post stent  Pt would like to wait until he sees cardio to start any meds again for lipids  Order placed

## 2021-03-23 NOTE — ASSESSMENT & PLAN NOTE
Refer to hand specialist  Ming Lawson without help in the past  Thumbs really giving him pain and he would like to try injections if possible

## 2021-03-23 NOTE — ASSESSMENT & PLAN NOTE
AST ALT significantly elevated 126 and 146 respectively  Decrease alcohol or stop  Recheck with next labs

## 2021-03-23 NOTE — ASSESSMENT & PLAN NOTE
Liver function tests extremely elevated at 126 and 146 respectively for AST an ALT  Patient highly encouraged to decrease her  Alcoholic consumption  Recheck with next labs

## 2021-04-06 ENCOUNTER — TELEMEDICINE (OUTPATIENT)
Dept: FAMILY MEDICINE CLINIC | Facility: CLINIC | Age: 56
End: 2021-04-06
Payer: COMMERCIAL

## 2021-04-06 VITALS — HEIGHT: 71 IN | WEIGHT: 215 LBS | BODY MASS INDEX: 30.1 KG/M2

## 2021-04-06 DIAGNOSIS — Z20.822 ENCOUNTER BY TELEHEALTH FOR SUSPECTED COVID-19: Primary | ICD-10-CM

## 2021-04-06 DIAGNOSIS — J01.90 ACUTE NON-RECURRENT SINUSITIS, UNSPECIFIED LOCATION: ICD-10-CM

## 2021-04-06 PROCEDURE — 1036F TOBACCO NON-USER: CPT | Performed by: NURSE PRACTITIONER

## 2021-04-06 PROCEDURE — 99214 OFFICE O/P EST MOD 30 MIN: CPT | Performed by: NURSE PRACTITIONER

## 2021-04-06 PROCEDURE — 3008F BODY MASS INDEX DOCD: CPT | Performed by: NURSE PRACTITIONER

## 2021-04-06 RX ORDER — AMOXICILLIN AND CLAVULANATE POTASSIUM 875; 125 MG/1; MG/1
1 TABLET, FILM COATED ORAL EVERY 12 HOURS SCHEDULED
Qty: 20 TABLET | Refills: 0 | Status: SHIPPED | OUTPATIENT
Start: 2021-04-06 | End: 2021-04-16

## 2021-04-06 RX ORDER — PREDNISONE 10 MG/1
TABLET ORAL
Qty: 30 TABLET | Refills: 0 | Status: SHIPPED | OUTPATIENT
Start: 2021-04-06 | End: 2021-08-23 | Stop reason: ALTCHOICE

## 2021-04-06 NOTE — PROGRESS NOTES
COVID-19 Outpatient Progress Note    Assessment/Plan:    Problem List Items Addressed This Visit        Respiratory    Acute non-recurrent sinusitis     Prednisone taper and Augmentin ordered to help with sinusitis symptoms  Patient also advised to continue to use Astelin nasal spray to help with congestion  COVID testing also ordered to rule out that this is a COVID infection exacerbated by an acute sinusitis  Relevant Medications    predniSONE 10 mg tablet    amoxicillin-clavulanate (Augmentin) 875-125 mg per tablet       Other    Encounter by telehealth for suspected COVID-19 - Primary     6Apr:  COVID testing ordered  Will follow-up with patient pending results of COVID test          Relevant Orders    Novel Coronavirus (COVID-19), PCR [facility] Collected at [place] [id number] or [facility]         Disposition:     I referred patient to one of our centralized sites for a COVID-19 swab  I have spent 15 minutes directly with the patient  Encounter provider Velvet Gaucher, CRNP    Provider located at [address] [place] [address] PRIMARY CARE  [facility] [place] [address]    Recent Visits  No visits were found meeting these conditions  Showing recent visits within past 7 days and meeting all other requirements     Today's Visits  Date Type Provider Dept   04/06/21 Telemedicine [facility] Primary Care   Showing today's visits and meeting all other requirements     Future Appointments  No visits were found meeting these conditions  Showing future appointments within next 150 days and meeting all other requirements      This virtual check-in was done via [employer] Du[employer] and patient was informed that this is not a secure, HIPAA-compliant platform  He agrees to proceed  Patient agrees to participate in a virtual check in via telephone or video visit instead of presenting to the office to address urgent/immediate medical needs   Patient is aware this is a billable service  After connecting through [facility], the patient was identified by name and date of birth  Mauro Wellington was informed that this was a telemedicine visit and that the exam was being conducted confidentially over secure lines  My office door was closed  No one else was in the room  Mauro Wellington acknowledged consent and understanding of privacy and security of the telemedicine visit  I informed the patient that I have reviewed his record in Epic and presented the opportunity for him to ask any questions regarding the visit today  The patient agreed to participate  Subjective:   Mauro Wellington is a 54 y o  male who is concerned about COVID-19  Patient's symptoms include nasal congestion and headache (intermittent)  Patient denies fever, chills, fatigue, malaise, rhinorrhea, sore throat, anosmia, loss of taste, cough, shortness of breath, chest tightness, abdominal pain, nausea, vomiting, diarrhea and myalgias  Exposure:   Contact with a person who is under investigation (PUI) for or who is positive for COVID-19 within the last 14 days?: No    Hospitalized recently for fever and/or lower respiratory symptoms?: No      Currently a healthcare worker that is involved in direct patient care?: No      Works in a special setting where the risk of COVID-19 transmission may be high? (this may include long-term care, correctional and MCC facilities; homeless shelters; assisted-living facilities and group homes ): No      Resident in a special setting where the risk of COVID-19 transmission may be high? (this may include long-term care, correctional and [facility]CC facilities; homeless shelters; assisted-living facilities and group homes ): No      Patient reports he has been having sinus pressure and pain behind left eye and left otalgia  Patient also reports blood and green mucus being present after blowing his nose  Patient also reports nasal congestion and intermittent sinus headaches  Patient denies any fevers or shortness of breath  Patient reports that the symptoms have been occurring over the past 3 months  Prednisone taper and Augmentin ordered to help with sinusitis  I will have patient tested for COVID to make sure that the patient is not having a COVID infection exacerbated by an acute sinusitis  Patient advised to continue to quarantine until he receives the results of his COVID test     No results found for: Yosef Duque, [address] [facility], [place] [address]  Past Medical History:   Diagnosis Date    High blood pressure     Hypercholesteremia     Hyperlipidemia     Hypertension     Prostatitis      Past Surgical History:   Procedure Laterality Date    APPENDECTOMY      CORONARY ANGIOPLASTY WITH STENT PLACEMENT  04/2013    LAD    NASAL SEPTUM SURGERY      MT COLONOSCOPY FLX DX W/COLLJ SPEC WHEN PFRMD N/A 12/9/2016    Procedure: EGD AND COLONOSCOPY;  Surgeon: Shimon Ward MD;  Location: [facility];   Service: Gastroenterology   Kacie Barrera       Current Outpatient Medications   Medication Sig Dispense Refill    amLODIPine (NORVASC) 10 mg tablet Take 1 tablet (10 mg total) by mouth daily 90 tablet 1    aspirin 81 MG tablet Take 81 mg by mouth daily      bisoprolol-hydrochlorothiazide (ZIAC) 5-6 25 MG per tablet Take 1 tablet by mouth daily 90 tablet 1    Cholecalciferol (VITAMIN D3) 1000 UNITS CAPS Take 1 capsule by mouth daily      folic acid (FOLVITE) 973 MCG tablet Take 400 mcg by mouth daily      lisinopril (ZESTRIL) 40 mg tablet Take 1 tablet (40 mg total) by mouth daily 90 tablet 1    omeprazole (PriLOSEC) 40 MG capsule Take 1 capsule (40 mg total) by mouth daily before breakfast 30 capsule 0    Saw Palmetto, Serenoa repens, 450 MG CAPS Take 1 capsule by mouth daily      amoxicillin-clavulanate (Augmentin) 875-125 mg per tablet Take 1 tablet by mouth every 12 (twelve) hours for 10 days 20 tablet 0    Omega-3 Fatty Acids (FISH OIL) 1,000 mg Take 1,000 mg by mouth daily      predniSONE 10 mg tablet Use 5 tablets for 2 days  Use 4 tablets for 2 days  Use 3 tablets for 2 days  Use 2 tablets for 2 days  Use 1 tablet for 2 days  30 tablet 0    rosuvastatin (CRESTOR) 20 MG tablet Take 1 tablet (20 mg total) by mouth daily (Patient not taking: Reported on 3/23/2021) 90 tablet 1     No current facility-administered medications for this visit  No Known Allergies    Review of Systems   Constitutional: Negative for chills, fatigue and fever  HENT: Positive for congestion, ear pain (left ear), nosebleeds (bloody drainage after blowing nose ), sinus pressure and sinus pain  Negative for dental problem, drooling, ear discharge, facial swelling, hearing loss, postnasal drip, rhinorrhea, sore throat, tinnitus, trouble swallowing and voice change  Eyes: Positive for pain (behind left eye)  Respiratory: Negative for cough, chest tightness and shortness of breath  Cardiovascular: Negative for chest pain and palpitations  Gastrointestinal: Negative for abdominal pain, diarrhea, nausea and vomiting  Endocrine: Negative  Genitourinary: Negative  Musculoskeletal: Negative for myalgias  Skin: Negative  Allergic/Immunologic: Negative  Neurological: Positive for headaches (intermittent)  Hematological: Negative  Psychiatric/Behavioral: Negative  Objective:    Vitals:    04/06/21 0828   Weight: 97 5 kg (215 lb)   Height: 5' 11" (1 803 m)       Physical Exam  Vitals reviewed: limited due to Google Duo exam    Constitutional:       General: He is not in acute distress  Appearance: Normal appearance  He is not ill-appearing  Neurological:      Mental Status: He is alert  VIRTUAL VISIT Parvvalery Wright 3599 acknowledges that he has consented to an online visit or consultation   He understands that the online visit is based solely on information provided by him, and that, in the absence of a face-to-face physical evaluation by the physician, the diagnosis he receives is both limited and provisional in terms of accuracy and completeness  This is not intended to replace a full medical face-to-face evaluation by the physician  David Mcknight understands and accepts these terms

## 2021-04-06 NOTE — ASSESSMENT & PLAN NOTE
Prednisone taper and Augmentin ordered to help with sinusitis symptoms  Patient also advised to continue to use Astelin nasal spray to help with congestion  COVID testing also ordered to rule out that this is a COVID infection exacerbated by an acute sinusitis

## 2021-04-06 NOTE — PATIENT INSTRUCTIONS
Problem List Items Addressed This Visit        Respiratory    Acute non-recurrent sinusitis     Prednisone taper and Augmentin ordered to help with sinusitis symptoms  Patient also advised to continue to use Astelin nasal spray to help with congestion  COVID testing also ordered to rule out that this is a COVID infection exacerbated by an acute sinusitis  Relevant Medications    predniSONE 10 mg tablet    amoxicillin-clavulanate (Augmentin) 875-125 mg per tablet       Other    Encounter by telehealth for suspected COVID-19 - Primary     6Apr:  COVID testing ordered  Will follow-up with patient pending results of COVID test          Relevant Orders    Novel Coronavirus ([id number]), PCR [facility] Col[facility] at [facility] or [address]'s Location  Address  Hours   Monday-Friday Saturday Hours    [facility]  [address]  8am-5pm  CLOSED    [place]   Reopening on 2/25 2020 [place] [address]  8am-5pm  8am-1pm      Please note: [employer], [employer] are closed as of [id number]  [address]    Your healthcare provider and/or public health staff have evaluated you and have determined that you do not need to remain in the hospital at this time  At this time you can be isolated at home where you will be monitored by staff from your local or state health department  You should carefully follow the prevention and isolation steps below until a healthcare provider or local or state health department says that you can return to your normal activities  Stay home except to get medical care    People who are mildly ill with COVID-19 are able to isolate at home during their illness  You should restrict activities outside your home, except for getting medical care  Do not go to work, school, or public areas   Avoid using public transportation, ride-sharing, or taxis  Separate yourself from other people and animals in your home    People: As much as possible, you should stay in a specific room and away from other people in your home  Also, you should use a separate bathroom, if available  Animals: You should restrict contact with pets and other animals while you are sick with COVID-19, just like you would around other people  Although there have not been reports of pets or other animals becoming sick with COVID-19, it is still recommended that people sick with COVID-19 limit contact with animals until more information is known about the virus  When possible, have another member of your household care for your animals while you are sick  If you are sick with COVID-19, avoid contact with your pet, including petting, snuggling, being kissed or licked, and sharing food  If you must care for your pet or be around animals while you are sick, wash your hands before and after you interact with pets and wear a facemask  See COVID-19 and Animals for more information  Call ahead before visiting your doctor    If you have a medical appointment, call the healthcare provider and tell them that you have or may have COVID-19  This will help the healthcare providers office take steps to keep other people from getting infected or exposed  Wear a facemask    You should wear a facemask when you are around other people (e g , sharing a room or vehicle) or pets and before you enter a healthcare providers office  If you are not able to wear a facemask (for example, because it causes trouble breathing), then people who live with you should not stay in the same room with you, or they should wear a facemask if they enter your room  Cover your coughs and sneezes    Cover your mouth and nose with a tissue when you cough or sneeze  Throw used tissues in a lined trash can   Immediately wash your hands with soap and water for at least 20 seconds or, if soap and water are not available, clean your hands with an alcohol-based hand  that contains at least 60% alcohol  Clean your hands often    Wash your hands often with soap and water for at least 20 seconds, especially after blowing your nose, coughing, or sneezing; going to the bathroom; and before eating or preparing food  If soap and water are not readily available, use an alcohol-based hand  with at least 60% alcohol, covering all surfaces of your hands and rubbing them together until they feel dry  Soap and water are the best option if hands are visibly dirty  Avoid touching your eyes, nose, and mouth with unwashed hands  Avoid sharing personal household items    You should not share dishes, drinking glasses, cups, eating utensils, towels, or bedding with other people or pets in your home  After using these items, they should be washed thoroughly with soap and water  Clean all high-touch surfaces everyday    High touch surfaces include counters, tabletops, doorknobs, bathroom fixtures, toilets, phones, keyboards, tablets, and bedside tables  Also, clean any surfaces that may have blood, stool, or body fluids on them  Use a household cleaning spray or wipe, according to the label instructions  Labels contain instructions for safe and effective use of the cleaning product including precautions you should take when applying the product, such as wearing gloves and making sure you have good ventilation during use of the product  Monitor your symptoms    Seek prompt medical attention if your illness is worsening (e g , difficulty breathing)  Before seeking care, call your healthcare provider and tell them that you have, or are being evaluated for, COVID-19  Put on a facemask before you enter the facility  These steps will help the healthcare providers office to keep other people in the office or waiting room from getting infected or exposed  Ask your healthcare provider to call the local or [facility] department  Persons who are placed under active monitoring or facilitated self-monitoring should follow instructions provided by their local health department or occupational health professionals, as appropriate  If you have a medical emergency and need to call 911, notify the dispatch personnel that you have, or are being evaluated for COVID-19  If possible, put on a facemask before emergency medical services arrive  Discontinuing home isolation    Patients with confirmed COVID-19 should remain under home isolation precautions until the following conditions are met:   - They have had no fever for at least 24 hours (that is one full day of no fever without the use medicine that reduces fevers)  AND  - other symptoms have improved (for example, when their cough or shortness of breath have improved)  AND  - If had mild or moderate illness, at least 10 days have passed since their symptoms first appeared or if severe illness (needed oxygen) or immunosuppressed, at least 20 days have passed since symptoms first appeared  Patients with confirmed COVID-19 should also notify close contacts (including their workplace) and ask that they self-quarantine  Currently, close contact is defined as being within 6 feet for 15 minutes or more from the period 24 hours starting 48 hours before symptom onset to the time at which the patient went into isolation  Close contacts of patients diagnosed with COVID-19 should be instructed by the patient to self-quarantine for 14 days from the last time of their last contact with the patient       Source: Braxton jean

## 2021-05-04 ENCOUNTER — TELEPHONE (OUTPATIENT)
Dept: CARDIOLOGY CLINIC | Facility: CLINIC | Age: 56
End: 2021-05-04

## 2021-05-04 NOTE — TELEPHONE ENCOUNTER
Spoke with pt regarding referral placed by PCP, pt wanted to know if PG would be willing to see him even though he hasn't been seen since 2015  PG stated he knows pt & can see him for a 20 min if pt is willing to take first available appt

## 2021-05-13 ENCOUNTER — CONSULT (OUTPATIENT)
Dept: OBGYN CLINIC | Facility: MEDICAL CENTER | Age: 56
End: 2021-05-13
Payer: COMMERCIAL

## 2021-05-13 VITALS
DIASTOLIC BLOOD PRESSURE: 68 MMHG | WEIGHT: 215 LBS | HEART RATE: 67 BPM | SYSTOLIC BLOOD PRESSURE: 104 MMHG | HEIGHT: 71 IN | BODY MASS INDEX: 30.1 KG/M2

## 2021-05-13 DIAGNOSIS — G56.22 ULNAR NEURITIS, LEFT: Primary | ICD-10-CM

## 2021-05-13 DIAGNOSIS — M18.0 ARTHRITIS OF CARPOMETACARPAL (CMC) JOINT OF BOTH THUMBS: ICD-10-CM

## 2021-05-13 PROCEDURE — 99244 OFF/OP CNSLTJ NEW/EST MOD 40: CPT | Performed by: ORTHOPAEDIC SURGERY

## 2021-05-13 RX ORDER — NAPROXEN 500 MG/1
500 TABLET ORAL 2 TIMES DAILY WITH MEALS
Qty: 28 TABLET | Refills: 0 | Status: SHIPPED | OUTPATIENT
Start: 2021-05-13 | End: 2021-08-23 | Stop reason: ALTCHOICE

## 2021-05-13 NOTE — PROGRESS NOTES
Chief Complaint     Bilateral thumb pain and left elbow pain      History of Present Illness     Mauro Wellington is a 54 y o  right hand dominant male who presents today for evaluation of bilateral thumb and left elbow pain  I am being asked to see the patient in consultation at the request of Isis Miranda PA-C  Patient states he's had pain in his thumbs for years  He also describes some occasional generalized pain in his knuckles  States he will get stiffness in his hands in the mornings, but this can also occur during the day with activity  He has not tried any formal treatment for his hands  Regarding his left elbow, describes the pain in the medial aspect of the elbow  States he does not get numbness/tingling during the day, but sometimes his arm will fall asleep at night when his elbow is flexed  Patient is currently working as   Past Medical History:   Diagnosis Date    High blood pressure     Hypercholesteremia     Hyperlipidemia     Hypertension     Prostatitis        Past Surgical History:   Procedure Laterality Date    APPENDECTOMY      CORONARY ANGIOPLASTY WITH STENT PLACEMENT  04/2013    LAD    NASAL SEPTUM SURGERY      [place] COLONOSCOPY FLX DX W/COLLJ SPEC WHEN PFRMD N/A 12/9/2016    Procedure: EGD AND COLONOSCOPY;  Surgeon: Natalie Joyner MD;  [facility];   Service: Gastroenterology    SEPTOPLASTY         No Known Allergies    Current Outpatient Medications on File Prior to Visit   Medication Sig Dispense Refill    amLODIPine (NORVASC) 10 mg tablet Take 1 tablet (10 mg total) by mouth daily 90 tablet 1    aspirin 81 MG tablet Take 81 mg by mouth daily      bisoprolol-hydrochlorothiazide (ZIAC) 5-6 25 MG per tablet Take 1 tablet by mouth daily 90 tablet 1    Cholecalciferol (VITAMIN D3) 1000 UNITS CAPS Take 1 capsule by mouth daily      folic acid (FOLVITE) 741 MCG tablet Take 400 mcg by mouth daily      lisinopril (ZESTRIL) 40 mg tablet Take 1 tablet (40 mg total) by mouth daily 90 tablet 1    Omega-3 Fatty Acids (FISH OIL) 1,000 mg Take 1,000 mg by mouth daily      omeprazole (PriLOSEC) 40 MG capsule Take 1 capsule (40 mg total) by mouth daily before breakfast 30 capsule 0    predniSONE 10 mg tablet Use 5 tablets for 2 days  Use 4 tablets for 2 days  Use 3 tablets for 2 days  Use 2 tablets for 2 days  Use 1 tablet for 2 days  30 tablet 0    rosuvastatin (CRESTOR) 20 MG tablet Take 1 tablet (20 mg total) by mouth daily 90 tablet 1    Saw Palmetto, Serenoa repens, 450 MG CAPS Take 1 capsule by mouth daily       No current facility-administered medications on file prior to visit  Social History     Tobacco Use    Smoking status: Never Smoker    Smokeless tobacco: Never Used    Tobacco comment: second hand smoke exposure (parents)   Substance Use Topics    Alcohol use: Yes     Comment: "probably every day"  7 beers/day or equivalent    Drug use: No       Family History   Problem Relation Age of Onset    Diverticulitis Mother     Colon cancer Mother         smoker    Depression Mother     Schizophrenia Mother         Paranoid    Hyperlipidemia Mother     Lung cancer Father         Smoker    Cancer Father         bladder    Neuropathy Father     Diabetes Father         no injections    Hyperlipidemia Father     No Known Problems Sister     No Known Problems Brother     Hyperlipidemia Sister     Diabetes Paternal Uncle     Lung cancer Paternal Uncle        Review of Systems     As stated in the HPI  All other systems were reviewed and are negative  Physical Exam     /68   Pulse 67   Ht 5' 11" (1 803 m)   Wt 97 5 kg (215 lb)   BMI 29 99 kg/m²     GENERAL: This is a well-developed, well-nourished, age-appropriate patient in no acute distress  The patient is alert and oriented x3  Pleasant and cooperative  Eyes: Anicteric sclerae  Extraocular movements appear intact  HENT: Nares are patent with no drainage  Lungs:  There is equal chest rise on inspection  Breathing is non-labored with no audible wheezing  Cardiovascular: No cyanosis  No upper extremity lymphadema  Skin: Skin is warm to touch  No obvious skin lesions or rashes other than described below  Neurologic: No ataxia  Psychiatric: Mood and affect are appropriate  Bilateral Upper Extremities:  Skin intact  No edema/erythema/ecchymosis  Full elbow flexion/extension  Full supination/pronation  Full wrist flexion/extension  DPC 0  Patient with adduction deformity to the thumbs  Pain at thumb CMC joints  No thumb MP joint hyperextension  Positive thumb adduction test   Nontender to index-small A1 pulleys  5/5 Motor to the APB, FDI, FDP2, FDP5, EDC  Sensation intact to light touch in the median, radial, and ulnar nerve distribution  Radial pulse 2+    Left Upper Extremity only:  Ulnar nerve stable left  Patient describes his tenderness along cubital tunnel  Nontender medial epicondyle  Negative tinels cubital tunnel    Data Review     Labs:  None    Electrodiagnostic Testing:  None    Imaging:  Previous xrays from 2018 of bilateral hands personally reviewed by me shows signs of thumb CMC arthritis with joint space narrowing and bone spur formation  Assessment and Plan      Diagnoses and all orders for this visit:    Ulnar neuritis, left    Arthritis of carpometacarpal (CMC) joint of both thumbs  -     Ambulatory referral to Hand Surgery  -     naproxen (NAPROSYN) 500 mg tablet; Take 1 tablet (500 mg total) by mouth 2 (two) times a day with meals for 14 days  -     Thumb Cude comf/Cool  -     Diclofenac Sodium (VOLTAREN) 1 %; Apply 2 g topically 4 (four) times a day       54year old male with bilateral thumb CMC joint DJD and left ulnar neuritis  Regarding the thumb, patient was advised to take naproxen x 2 weeks with food to treat the inflammation  After that, start use of Voltaren Gel   I did explain he can buy the Voltaren Gel either through his insurance w a prescription or over the counter, whichever is more cost effective  In addition, patient was shown comfort cool braces today  He believes he has similar braces at home and would like to first check this  He will call if he does not and later pick them up  Regarding the left elbow, patient likely with some ulnar neuritis from positioning at night  He was educated on how to use the towel roll technique to prevent elbow flexion while he's sleeping  I have discussed the natural history of thumb carpometacarpal arthritis as well as the treatment options  We discussed that arthritis in the thumb can be treated similarly to how arthritis is treated in many parts of the body with conservative management and then surgical intervention if the nonsurgical options do not succeeded bringing sustained pain relief and good function  The nonsurgical options include oral anti-inflammatory medication, braces, hand therapy, and injectable medications such as corticosteroid  These can all be trialed and repeated multiple times  We have also discussed the surgical management of thumb carpometacarpal arthritis which include thumb carpometacarpal joint fusion or arthroplasty using LRTI or suspensionplasty techniques  We discussed the etiology and natural course of cubital tunnel syndrome at the elbow  This is related to compression of the ulnar nerve at or around the medial epicondyle or FCU fascia  Compression typically results in numbness to the hand, pain, and occasionally weakness  We discussed that there are nonoperative and operative modalities for treatment and at the majority of patients benefit from non operative modalities  Typically, this involves the night splinting and ergonomic adjustments to prevent direct pressure or compression of the ulnar nerve at the elbow  Surgical treatment can involve in situ decompression or transposition if the nerve is unstable        Follow Up: 6 weeks    To Do Next Visit: Reevaluate symptoms    PROCEDURES PERFORMED:  Procedures  No Procedures performed today

## 2021-05-13 NOTE — LETTER
May 19, 2021     Mauro [address] [place] [address] [place]    Patient: Brynn Dyson   YOB: 1965   Date of Visit: 5/13/2021       Dear Dr Jovany Bateman: Thank you for referring Georgie Guzmán to me for evaluation  Below are my notes for this consultation  If you have questions, please do not hesitate to call me  I look forward to following your patient along with you  Sincerely,        Charles Urrutia MD        CC: No Recipients  Charles Urrutia MD  5/19/2021  8:06 PM  Sign when Signing Visit  Chief Complaint     Bilateral thumb pain and left elbow pain      History of Present Illness     Brynn Dyson is a 54 y o  right hand dominant male who presents today for evaluation of bilateral thumb and left elbow pain  I am being asked to see the patient in consultation at the request of Yulissa Alonso PA-C  Patient states he's had pain in his thumbs for years  He also describes some occasional generalized pain in his knuckles  States he will get stiffness in his hands in the mornings, but this can also occur during the day with activity  He has not tried any formal treatment for his hands  Regarding his left elbow, describes the pain in the medial aspect of the elbow  States he does not get numbness/tingling during the day, but sometimes his arm will fall asleep at night when his elbow is flexed  Patient is currently working as   Past Medical History:   Diagnosis Date    High blood pressure     Hypercholesteremia     Hyperlipidemia     Hypertension     Prostatitis        Past Surgical History:   Procedure Laterality Date    APPENDECTOMY      CORONARY ANGIOPLASTY WITH STENT PLACEMENT  04/2013    LAD    NASAL SEPTUM SURGERY      [place] COLONOSCOPY FLX DX W/COLLJ SPEC WHEN PFRMD N/A 12/9/2016    Procedure: EGD AND COLONOSCOPY;  Surgeon: Edilma Rainey MD;  [facility];   Service: Gastroenterology    SEPTOPLASTY         No Known Allergies    Current Outpatient Medications on File Prior to Visit   Medication Sig Dispense Refill    amLODIPine (NORVASC) 10 mg tablet Take 1 tablet (10 mg total) by mouth daily 90 tablet 1    aspirin 81 MG tablet Take 81 mg by mouth daily      bisoprolol-hydrochlorothiazide (ZIAC) 5-6 25 MG per tablet Take 1 tablet by mouth daily 90 tablet 1    Cholecalciferol (VITAMIN D3) 1000 UNITS CAPS Take 1 capsule by mouth daily      folic acid (FOLVITE) 253 MCG tablet Take 400 mcg by mouth daily      lisinopril (ZESTRIL) 40 mg tablet Take 1 tablet (40 mg total) by mouth daily 90 tablet 1    Omega-3 Fatty Acids (FISH OIL) 1,000 mg Take 1,000 mg by mouth daily      omeprazole (PriLOSEC) 40 MG capsule Take 1 capsule (40 mg total) by mouth daily before breakfast 30 capsule 0    predniSONE 10 mg tablet Use 5 tablets for 2 days  Use 4 tablets for 2 days  Use 3 tablets for 2 days  Use 2 tablets for 2 days  Use 1 tablet for 2 days  30 tablet 0    rosuvastatin (CRESTOR) 20 MG tablet Take 1 tablet (20 mg total) by mouth daily 90 tablet 1    Saw Palmetto, Serenoa repens, 450 MG CAPS Take 1 capsule by mouth daily       No current facility-administered medications on file prior to visit          Social History     Tobacco Use    Smoking status: Never Smoker    Smokeless tobacco: Never Used    Tobacco comment: second hand smoke exposure (parents)   Substance Use Topics    Alcohol use: Yes     Comment: "probably every day"  7 beers/day or equivalent    Drug use: No       Family History   Problem Relation Age of Onset    Diverticulitis Mother     Colon cancer Mother         smoker    Depression Mother     Schizophrenia Mother         Paranoid    Hyperlipidemia Mother     Lung cancer Father         Smoker    Cancer Father         bladder    Neuropathy Father     Diabetes Father         no injections    Hyperlipidemia Father     No Known Problems Sister     No Known Problems Brother     Hyperlipidemia Sister    Fabianamaile Liley Diabetes Paternal Uncle     Lung cancer Paternal Uncle        Review of Systems     As stated in the HPI  All other systems were reviewed and are negative  Physical Exam     /68   Pulse 67   Ht 5' 11" (1 803 m)   Wt 97 5 kg (215 lb)   BMI 29 99 kg/m²     GENERAL: This is a well-developed, well-nourished, age-appropriate patient in no acute distress  The patient is alert and oriented x3  Pleasant and cooperative  Eyes: Anicteric sclerae  Extraocular movements appear intact  HENT: Nares are patent with no drainage  Lungs: There is equal chest rise on inspection  Breathing is non-labored with no audible wheezing  Cardiovascular: No cyanosis  No upper extremity lymphadema  Skin: Skin is warm to touch  No obvious skin lesions or rashes other than described below  Neurologic: No ataxia  Psychiatric: Mood and affect are appropriate  Bilateral Upper Extremities:  Skin intact  No edema/erythema/ecchymosis  Full elbow flexion/extension  Full supination/pronation  Full wrist flexion/extension  DPC 0  Patient with adduction deformity to the thumbs  Pain at thumb CMC joints  No thumb MP joint hyperextension  Positive thumb adduction test   Nontender to index-small A1 pulleys  5/5 Motor to the APB, FDI, FDP2, FDP5, EDC  Sensation intact to light touch in the median, radial, and ulnar nerve distribution  Radial pulse 2+    Left Upper Extremity only:  Ulnar nerve stable left  Patient describes his tenderness along cubital tunnel  Nontender medial epicondyle  Negative tinels cubital tunnel    Data Review     Labs:  None    Electrodiagnostic Testing:  None    Imaging:  Previous xrays from 2018 of bilateral hands personally reviewed by me shows signs of thumb CMC arthritis with joint space narrowing and bone spur formation      Assessment and Plan      Diagnoses and all orders for this visit:    Ulnar neuritis, left    Arthritis of carpometacarpal Reynolds) joint of both thumbs  -     Ambulatory referral to Hand Surgery  -     naproxen (NAPROSYN) 500 mg tablet; Take 1 tablet (500 mg total) by mouth 2 (two) times a day with meals for 14 days  -     Thumb Cude comf/Cool  -     Diclofenac Sodium (VOLTAREN) 1 %; Apply 2 g topically 4 (four) times a day       54year old male with bilateral thumb CMC joint DJD and left ulnar neuritis  Regarding the thumb, patient was advised to take naproxen x 2 weeks with food to treat the inflammation  After that, start use of Voltaren Gel  I did explain he can buy the Voltaren Gel either through his insurance w a prescription or over the counter, whichever is more cost effective  In addition, patient was shown comfort cool braces today  He believes he has similar braces at home and would like to first check this  He will call if he does not and later pick them up  Regarding the left elbow, patient likely with some ulnar neuritis from positioning at night  He was educated on how to use the towel roll technique to prevent elbow flexion while he's sleeping  I have discussed the natural history of thumb carpometacarpal arthritis as well as the treatment options  We discussed that arthritis in the thumb can be treated similarly to how arthritis is treated in many parts of the body with conservative management and then surgical intervention if the nonsurgical options do not succeeded bringing sustained pain relief and good function  The nonsurgical options include oral anti-inflammatory medication, braces, hand therapy, and injectable medications such as corticosteroid  These can all be trialed and repeated multiple times  We have also discussed the surgical management of thumb carpometacarpal arthritis which include thumb carpometacarpal joint fusion or arthroplasty using LRTI or suspensionplasty techniques  We discussed the etiology and natural course of cubital tunnel syndrome at the elbow    This is related to compression of the ulnar nerve at or around the medial epicondyle or FCU fascia  Compression typically results in numbness to the hand, pain, and occasionally weakness  We discussed that there are nonoperative and operative modalities for treatment and at the majority of patients benefit from non operative modalities  Typically, this involves the night splinting and ergonomic adjustments to prevent direct pressure or compression of the ulnar nerve at the elbow  Surgical treatment can involve in situ decompression or transposition if the nerve is unstable        Follow Up: 6 weeks    To Do Next Visit: Reevaluate symptoms    PROCEDURES PERFORMED:  Procedures  No Procedures performed today

## 2021-05-24 ENCOUNTER — OFFICE VISIT (OUTPATIENT)
Dept: CARDIOLOGY CLINIC | Facility: CLINIC | Age: 56
End: 2021-05-24
Payer: COMMERCIAL

## 2021-05-24 VITALS
DIASTOLIC BLOOD PRESSURE: 66 MMHG | HEIGHT: 71 IN | HEART RATE: 58 BPM | SYSTOLIC BLOOD PRESSURE: 106 MMHG | WEIGHT: 214 LBS | BODY MASS INDEX: 29.96 KG/M2

## 2021-05-24 DIAGNOSIS — I25.10 ATHEROSCLEROSIS OF NATIVE CORONARY ARTERY OF NATIVE HEART WITHOUT ANGINA PECTORIS: ICD-10-CM

## 2021-05-24 DIAGNOSIS — E78.5 HYPERLIPIDEMIA, UNSPECIFIED HYPERLIPIDEMIA TYPE: ICD-10-CM

## 2021-05-24 DIAGNOSIS — R07.89 ATYPICAL CHEST PAIN: Primary | ICD-10-CM

## 2021-05-24 DIAGNOSIS — E78.2 MIXED HYPERLIPIDEMIA: ICD-10-CM

## 2021-05-24 DIAGNOSIS — I10 ESSENTIAL HYPERTENSION: ICD-10-CM

## 2021-05-24 PROCEDURE — 3074F SYST BP LT 130 MM HG: CPT | Performed by: INTERNAL MEDICINE

## 2021-05-24 PROCEDURE — 3078F DIAST BP <80 MM HG: CPT | Performed by: INTERNAL MEDICINE

## 2021-05-24 PROCEDURE — 99243 OFF/OP CNSLTJ NEW/EST LOW 30: CPT | Performed by: INTERNAL MEDICINE

## 2021-05-24 PROCEDURE — 93000 ELECTROCARDIOGRAM COMPLETE: CPT | Performed by: INTERNAL MEDICINE

## 2021-05-24 RX ORDER — ROSUVASTATIN CALCIUM 5 MG/1
5 TABLET, COATED ORAL DAILY
Qty: 90 TABLET | Refills: 1 | Status: SHIPPED | OUTPATIENT
Start: 2021-05-24 | End: 2021-10-15 | Stop reason: SDUPTHER

## 2021-05-24 NOTE — PROGRESS NOTES
Cardiology Consultation     Brynn Dyson  [id number]  1965 [address] [address]    Reason for visit:  Overdue for follow-up (now greater than 3 years) for CAD status post stenting left anterior descending artery in 2013  Also has hypertension and hyperlipidemia    1  Atherosclerosis of native coronary artery of native heart without angina pectoris  Ambulatory referral to Cardiology    POCT ECG   2  Essential hypertension     3  Mixed hyperlipidemia     HPI: He does note some chest pain like an  Ache every once in awhile    It is infrequent    It can last for 1-20 minutes  It can occur any at time  When he is active it does not necessarily occur  Denies dyspnea exertion  He denies edema, palpitations or dizziness    Patient Active Problem List   Diagnosis    Alcoholism ([facility] [address] )    LEO positive    Atherosclerotic heart disease of native coronary artery without angina pectoris    Biceps rupture, proximal    Cluster headache    Elevated LFTs    Angelica's syndrome    Hiatal hernia    Hyperglycemia    Mixed hyperlipidemia    Essential hypertension    Elevated sed rate    Diverticulosis    Plantar fasciitis    CMC arthritis    Arthritis pain of hand    Lower abdominal pain    Dermatitis    Epigastric pain    Fatty liver, alcoholic    Tubular adenoma of colon    Encounter by telehealth for suspected COVID-19    Acute non-recurrent sinusitis     Past Medical History:   Diagnosis Date    High blood pressure     Hypercholesteremia     Hyperlipidemia     Hypertension     Prostatitis      Social History     Socioeconomic History    Marital status: Significant Other     Spouse name: Not on file    Number of children: 2    Years of education: Not on file    Highest education level: Not on file   Occupational History    Not on file   Social Needs    Financial resource strain: Not on file    Food insecurity     Worry: Not on file     Inability: Not on file    Transportation needs     Medical: Not on file     Non-medical: Not on file   Tobacco Use    Smoking status: Never Smoker    Smokeless tobacco: Never Used    Tobacco comment: second hand smoke exposure (parents)   Substance and Sexual Activity    Alcohol use: Yes     Comment: "probably every day"  7 beers/day or equivalent    Drug use: No    Sexual activity: Not on file   Lifestyle    Physical activity     Days per week: Not on file     Minutes per session: Not on file    Stress: Not on file   Relationships    Social connections     Talks on phone: Not on file     Gets together: Not on file     Attends Bahai service: Not on file     Active member of club or organization: Not on file     Attends meetings of clubs or organizations: Not on file     Relationship status: Not on file    Intimate partner violence     Fear of current or ex partner: Not on file     Emotionally abused: Not on file     Physically abused: Not on file     Forced sexual activity: Not on file   Other Topics Concern    Not on file   Social History Narrative            Per [employer]:     Exercises 5x week    Pets/Animals: Cat      Family History   Problem Relation Age of Onset    Diverticulitis Mother     Colon cancer Mother         smoker    Depression Mother     Schizophrenia Mother         Paranoid    Hyperlipidemia Mother     Lung cancer Father         Smoker    Cancer Father         bladder    Neuropathy Father     Diabetes Father         no injections    Hyperlipidemia Father     No Known Problems Sister     No Known Problems Brother     Hyperlipidemia Sister     Diabetes Paternal Uncle     Lung cancer Paternal Uncle      Past Surgical History:   Procedure Laterality Date    APPENDECTOMY      CORONARY ANGIOPLASTY WITH STENT PLACEMENT  04/2013    LAD    NASAL SEPTUM SURGERY      [place] COLONOSCOPY FLX DX W/COLLJ SPEC WHEN PFRMD N/A 12/9/2016    Procedure: EGD AND COLONOSCOPY;  Surgeon: Geovany Villarreal MD;  Location: [facility]; Service: Gastroenterology   Steffen Hdz         Current Outpatient Medications:     amLODIPine (NORVASC) 10 mg tablet, Take 1 tablet (10 mg total) by mouth daily, Disp: 90 tablet, Rfl: 1    aspirin 81 MG tablet, Take 81 mg by mouth daily, Disp: , Rfl:     bisoprolol-hydrochlorothiazide (ZIAC) 5-6 25 MG per tablet, Take 1 tablet by mouth daily, Disp: 90 tablet, Rfl: 1    lisinopril (ZESTRIL) 40 mg tablet, Take 1 tablet (40 mg total) by mouth daily, Disp: 90 tablet, Rfl: 1    omeprazole (PriLOSEC) 40 MG capsule, Take 1 capsule (40 mg total) by mouth daily before breakfast, Disp: 30 capsule, Rfl: 0    Cholecalciferol (VITAMIN D3) 1000 UNITS CAPS, Take 1 capsule by mouth daily, Disp: , Rfl:     Diclofenac Sodium (VOLTAREN) 1 %, Apply 2 g topically 4 (four) times a day (Patient not taking: Reported on 5/24/2021), Disp: 100 g, Rfl: 0    folic acid (FOLVITE) 069 MCG tablet, Take 400 mcg by mouth daily, Disp: , Rfl:     naproxen (NAPROSYN) 500 mg tablet, Take 1 tablet (500 mg total) by mouth 2 (two) times a day with meals for 14 days (Patient not taking: Reported on 5/24/2021), Disp: 28 tablet, Rfl: 0    Omega-3 Fatty Acids (FISH OIL) 1,000 mg, Take 1,000 mg by mouth daily, Disp: , Rfl:     predniSONE 10 mg tablet, Use 5 tablets for 2 days  Use 4 tablets for 2 days  Use 3 tablets for 2 days  Use 2 tablets for 2 days  Use 1 tablet for 2 days   (Patient not taking: Reported on 5/24/2021), Disp: 30 tablet, Rfl: 0    rosuvastatin (CRESTOR) 20 MG tablet, Take 1 tablet (20 mg total) by mouth daily (Patient not taking: Reported on 5/24/2021), Disp: 90 tablet, Rfl: 1    Saw Palmetto, Serenoa repens, 450 MG CAPS, Take 1 capsule by mouth daily, Disp: , Rfl:   No Known Allergies  Vitals:    05/24/21 1402   BP: 106/66   Pulse: 58   Weight: 97 1 kg (214 lb)   Height: 5' 11" (1 803 m)       Review of Systems:  Review of Systems   Constitutional: Positive for fatigue  Negative for activity change, appetite change and unexpected weight change  Respiratory: Negative for cough, chest tightness, shortness of breath and wheezing  Cardiovascular: Positive for chest pain  Negative for palpitations and leg swelling  Gastrointestinal: Positive for abdominal pain  Negative for blood in stool, constipation and diarrhea  Genitourinary: Positive for frequency  Negative for dysuria, hematuria and urgency  Musculoskeletal: Positive for arthralgias and back pain  Negative for gait problem and joint swelling  Neurological: Negative for dizziness, light-headedness, numbness and headaches  Psychiatric/Behavioral: Negative for agitation, behavioral problems, confusion and decreased concentration  Physical Exam:  Vitals:    05/24/21 1402   BP: 106/66   Pulse: 58   Weight: 97 1 kg (214 lb)   Height: 5' 11" (1 803 m)   ]    Physical Exam  Constitutional:       General: He is not in acute distress  Appearance: He is obese  He is not ill-appearing  HENT:      Mouth/Throat:      Mouth: Mucous membranes are moist       Pharynx: No oropharyngeal exudate  Eyes:      General: No scleral icterus  Conjunctiva/sclera: Conjunctivae normal    Neck:      Musculoskeletal: Neck supple  Thyroid: No thyroid mass or thyromegaly  Vascular: Normal carotid pulses  No carotid bruit or JVD  Cardiovascular:      Rate and Rhythm: Normal rate and regular rhythm  Pulses: Normal pulses  Heart sounds: No murmur  No friction rub  No gallop  Pulmonary:      Breath sounds: Normal breath sounds  No wheezing, rhonchi or rales  Abdominal:      Palpations: Abdomen is soft  There is no hepatomegaly, splenomegaly or mass  Tenderness: There is no abdominal tenderness  Musculoskeletal:         General: No swelling  Neurological:      Mental Status: He is alert  Discussion/Summary:  1   Atypical chest pain   By history this sounds noncardiac  The duration and character symptoms are very unusual for ischemic pain  Additionally this occurs at any time and not brought on by exertion on a regular basis  I will order a regular stress test on him  His last stress test was a stress echo in 2015 which was normal   His EKG response at that time was normal and he has not normal baseline EKG  I will follow-up with him by phone regarding the testing results  2  CAD status post remote stent of the LAD  See above comments regarding chest pain  Patient is on low-dose aspirin and a beta-blocker in the form of bisoprolol  3  Hypertension  Excellent control on regimen includes bisoprolol/hydrochlorothiazide 5/6 25, amlodipine 10 mg daily and lisinopril 40 mg daily  4  Hyperlipidemia  Patient's last LDL cholesterol 155 but he was off rosuvastatin which was prescribed to 20 mg daily due to some side effects which may be statin myalgias  I have recommended starting 5 mg daily to see if he does have issues with this dose  We will need to watch his liver function tests since these were elevated in February off statin therapy    I would recommend an  AST and LDL cholesterol 6 weeks after initiating rosuvastatin          FU by phone after stress test      Rebekah Riley MD

## 2021-06-08 ENCOUNTER — CONSULT (OUTPATIENT)
Dept: GASTROENTEROLOGY | Facility: CLINIC | Age: 56
End: 2021-06-08
Payer: COMMERCIAL

## 2021-06-08 VITALS
HEIGHT: 71 IN | WEIGHT: 211 LBS | HEART RATE: 77 BPM | BODY MASS INDEX: 29.54 KG/M2 | TEMPERATURE: 97.8 F | DIASTOLIC BLOOD PRESSURE: 80 MMHG | SYSTOLIC BLOOD PRESSURE: 114 MMHG

## 2021-06-08 DIAGNOSIS — R79.89 ELEVATED LFTS: ICD-10-CM

## 2021-06-08 DIAGNOSIS — K70.0 FATTY LIVER, ALCOHOLIC: Primary | ICD-10-CM

## 2021-06-08 DIAGNOSIS — D12.6 TUBULAR ADENOMA OF COLON: ICD-10-CM

## 2021-06-08 DIAGNOSIS — K21.9 GASTROESOPHAGEAL REFLUX DISEASE WITHOUT ESOPHAGITIS: ICD-10-CM

## 2021-06-08 PROCEDURE — 99244 OFF/OP CNSLTJ NEW/EST MOD 40: CPT | Performed by: INTERNAL MEDICINE

## 2021-06-08 PROCEDURE — 3008F BODY MASS INDEX DOCD: CPT | Performed by: INTERNAL MEDICINE

## 2021-06-08 PROCEDURE — 1036F TOBACCO NON-USER: CPT | Performed by: INTERNAL MEDICINE

## 2021-06-08 RX ORDER — SODIUM, POTASSIUM,MAG SULFATES 17.5-3.13G
354 SOLUTION, RECONSTITUTED, ORAL ORAL ONCE
Qty: 354 ML | Refills: 0 | Status: SHIPPED | OUTPATIENT
Start: 2021-06-08 | End: 2021-08-23 | Stop reason: ALTCHOICE

## 2021-06-08 NOTE — ASSESSMENT & PLAN NOTE
Likely related to fatty liver disease with significant alcohol intake as well    · Workup as noted above

## 2021-06-08 NOTE — PROGRESS NOTES
[facility] - Outpatient Consultation  Cely Workman 54 y o  male MRN: [id number]  Encounter: [id number]      ASSESSMENT AND PLAN:      Problem List Items Addressed This Visit        Digestive    Fatty liver, alcoholic - Primary     Severe fatty infiltration of the liver seen on previous imaging likely compounded by significant alcohol intake  Most recent CMP showing elevated transaminases, ALP, and mild elevation of bilirubin to 1 1  No clinical or laboratory stigmata of cirrhosis currently  · Check right upper quadrant US with liver dopplers  · Check US elastography/UGAP  · Counseled patient on the importance of alcohol cessation to minimize any further insult or injury to his liver with the added benefit of weight loss         Relevant Orders    US right upper quadrant with liver dopplers    US elastography/UGAP    Tubular adenoma of colon     History of tubular adenoma on colonoscopy from 2016  No alarm symptoms  Due for repeat colonoscopy  On aspirin 81 mg daily but no other anti-platelet agents or anticoagulants  Does have history of cardiac stent about 7 years ago  Family history of colon cancer in his mother diagnosed in her 76s  · Will schedule for screening colonoscopy  · Okay to continue aspirin  · Bowel prep instructions provided         Relevant Medications    Na Sulfate-K Sulfate-Mg Sulf (Suprep Bowel Prep Kit) 17 5-3 13-1 6 GM/177ML SOLN    Other Relevant Orders    Colonoscopy    Gastroesophageal reflux disease without esophagitis     Symptoms well controlled currently  EGD in 2016 with small hiatal hernia and mild gastritis with negative biopsies  No evidence of Gibbs's esophagus  · Continue omeprazole as needed   · No need for repeat EGD at this time            Other    Elevated LFTs     Likely related to fatty liver disease with significant alcohol intake as well    · Workup as noted above         Relevant Orders    US right upper quadrant with liver dopplers    US elastography/UGAP    Celiac Disease Antibody Profile        ______________________________________________________________________    HPI:  63-year-old male presents to GI office for screening colonoscopy  Last colonoscopy was in 2016 with finding of single tubular adenoma which was removed  Denies any alarm symptoms including hematochezia, melena, change in bowel habits, diarrhea, constipation, abdominal pain, nausea, vomiting, unintentional weight loss  Also had EGD done at that time which showed a small hiatal hernia and mild gastritis with negative biopsies  He carries a chronic diagnosis of GERD which is well controlled on omeprazole as needed  He admits to significant alcohol intake including vodka mixed drinks several times a week as well as light beer on occasion  Denies any history of alcohol withdrawal and does not carry a diagnosis of any cirrhosis  Does have findings of elevated liver enzymes and findings of severe hepatic steatosis noted on imaging previously      REVIEW OF SYSTEMS:    CONSTITUTIONAL: Denies any fever, chills, rigors, and weight loss  HEENT: No earache or tinnitus, denies hearing loss or visual disturbances  CARDIOVASCULAR: No chest pain or palpitations  RESPIRATORY: Denies any cough, hemoptysis, shortness of breath or dyspnea on exertion  GASTROINTESTINAL: As noted in the History of Present Illness  GENITOURINARY: No problems with urination, denies any hematuria or dysuria  NEUROLOGIC: No dizziness or vertigo, denies headaches   MUSCULOSKELETAL: Denies any muscle or joint pain   SKIN: Denies skin rashes or itching  ENDOCRINE: Denies excessive thirst, denies intolerance to heat or cold  PSYCHOSOCIAL: Denies depression or anxiety, denies any recent memory loss     Historical Information   Past Medical History:   Diagnosis Date    High blood pressure     Hypercholesteremia     Hyperlipidemia     Hypertension     Prostatitis      Past Surgical History:   Procedure Laterality Date    APPENDECTOMY      CORONARY ANGIOPLASTY WITH STENT PLACEMENT  04/2013    LAD    NASAL SEPTUM SURGERY      [place] COLONOSCOPY FLX DX W/COLLJ SPEC WHEN PFRMD N/A 12/9/2016    Procedure: EGD AND COLONOSCOPY;  Surgeon: Lacey Crystal MD;  Location: [facility]; Service: Gastroenterology    SEPTOPLASTY       Social History   Social History     Substance and Sexual Activity   Alcohol Use Yes    Comment: "probably every day"  7 beers/day or equivalent     Social History     Substance and Sexual Activity   Drug Use No     Social History     Tobacco Use   Smoking Status Never Smoker   Smokeless Tobacco Never Used   Tobacco Comment    second hand smoke exposure (parents)     Family History   Problem Relation Age of Onset    Diverticulitis Mother     Colon cancer Mother         smoker    Depression Mother     Schizophrenia Mother         Paranoid    Hyperlipidemia Mother     Lung cancer Father         Smoker    Cancer Father         bladder    Neuropathy Father     Diabetes Father         no injections    Hyperlipidemia Father     No Known Problems Sister     No Known Problems Brother     Hyperlipidemia Sister     Diabetes Paternal Uncle     Lung cancer Paternal Uncle        Meds/Allergies   (Not in a hospital admission)    No current facility-administered medications for this visit  No Known Allergies      PHYSICAL EXAM:      Objective   Blood pressure 114/80, pulse 77, temperature 97 8 °F (36 6 °C), temperature source Tympanic, height 5' 11" (1 803 m), weight 95 7 kg (211 lb)  Body mass index is 29 43 kg/m²      General Appearance:   Alert, cooperative, no distress   HEENT:   Normocephalic, atraumatic, anicteric     Neck:   Supple, symmetrical, trachea midline   Lungs:   Respirations unlabored    Heart:   Regular rate and rhythm   Abdomen:   Soft, non-tender, non-distended; normal bowel sounds; no masses, no organomegaly    Genitalia:   Deferred    Rectal:   Deferred    Extremities:   No cyanosis, clubbing or edema    Pulses:   2+ and symmetric all extremities    Skin:   No jaundice, rashes, or lesions    Lymph Nodes:   No palpable cervical lymphadenopathy        LAB RESULTS:     No visits with results within 1 Day(s) from this visit  Latest known visit with results is:   Appointment on 02/10/2021   Component Date Value    HIV-1/HIV-2 Ab 02/10/2021 Non-Reactive     Hemoglobin A1C 02/10/2021 5 3     EAG 02/10/2021 105     Sodium 02/10/2021 141     Potassium 02/10/2021 3 8     Chloride 02/10/2021 103     CO2 02/10/2021 27     ANION GAP 02/10/2021 11     BUN 02/10/2021 14     Creatinine 02/10/2021 0 95     Glucose 02/10/2021 122     Calcium 02/10/2021 8 9     AST 02/10/2021 126*    ALT 02/10/2021 146*    Alkaline Phosphatase 02/10/2021 128*    Total Protein 02/10/2021 8 2     Albumin 02/10/2021 3 6     Total Bilirubin 02/10/2021 1 10*    eGFR 02/10/2021 90     Cholesterol 02/10/2021 253*    Triglycerides 02/10/2021 138     HDL, Direct 02/10/2021 70     LDL Calculated 02/10/2021 155*       RADIOLOGY RESULTS: I have personally reviewed pertinent imaging studies  PHOENIX Powell  Gastroenterology Fellow  [facility]  Available on [email or web address]

## 2021-06-08 NOTE — ASSESSMENT & PLAN NOTE
Severe fatty infiltration of the liver seen on previous imaging likely compounded by significant alcohol intake  Most recent CMP showing elevated transaminases, ALP, and mild elevation of bilirubin to 1 1  No clinical or laboratory stigmata of cirrhosis currently    · Check right upper quadrant US with liver dopplers  · Check US elastography/UGAP  · Counseled patient on the importance of alcohol cessation to minimize any further insult or injury to his liver with the added benefit of weight loss

## 2021-06-08 NOTE — ASSESSMENT & PLAN NOTE
History of tubular adenoma on colonoscopy from 2016  No alarm symptoms  Due for repeat colonoscopy  On aspirin 81 mg daily but no other anti-platelet agents or anticoagulants  Does have history of cardiac stent about 7 years ago  Family history of colon cancer in his mother diagnosed in her 76s    · Will schedule for screening colonoscopy  · Okay to continue aspirin  · Bowel prep instructions provided

## 2021-06-08 NOTE — PATIENT INSTRUCTIONS
1  We will schedule you for a colonoscopy 2  2  We will check blood work on you  Please have this done at your earliest convenience  3  We will check an ultrasound of your liver  We will also check a separate special ultrasound called an elastrography  This will measure the stiffness of your liver  Colonoscopy scheduled at [facility] with Dr Keila Greene instructions given by Hannah Rea in the office      U/S and Elastrography scheduled 06/23/21 @[facility]

## 2021-06-08 NOTE — ASSESSMENT & PLAN NOTE
Symptoms well controlled currently  EGD in 2016 with small hiatal hernia and mild gastritis with negative biopsies  No evidence of Gibbs's esophagus    · Continue omeprazole as needed   · No need for repeat EGD at this time

## 2021-08-20 ENCOUNTER — TELEPHONE (OUTPATIENT)
Dept: GASTROENTEROLOGY | Facility: AMBULARY SURGERY CENTER | Age: 56
End: 2021-08-20

## 2021-08-23 ENCOUNTER — HOSPITAL ENCOUNTER (OUTPATIENT)
Dept: GASTROENTEROLOGY | Facility: AMBULARY SURGERY CENTER | Age: 56
Setting detail: OUTPATIENT SURGERY
Discharge: HOME | End: 2021-08-23
Admitting: STUDENT IN AN ORGANIZED HEALTH CARE EDUCATION/TRAINING PROGRAM
Payer: COMMERCIAL

## 2021-08-23 ENCOUNTER — ANESTHESIA EVENT (OUTPATIENT)
Dept: GASTROENTEROLOGY | Facility: AMBULARY SURGERY CENTER | Age: 56
End: 2021-08-23

## 2021-08-23 ENCOUNTER — ANESTHESIA (OUTPATIENT)
Dept: GASTROENTEROLOGY | Facility: AMBULARY SURGERY CENTER | Age: 56
End: 2021-08-23

## 2021-08-23 VITALS
OXYGEN SATURATION: 98 % | HEART RATE: 63 BPM | WEIGHT: 204 LBS | HEIGHT: 71 IN | DIASTOLIC BLOOD PRESSURE: 87 MMHG | SYSTOLIC BLOOD PRESSURE: 137 MMHG | TEMPERATURE: 97.3 F | RESPIRATION RATE: 18 BRPM | BODY MASS INDEX: 28.56 KG/M2

## 2021-08-23 DIAGNOSIS — D12.6 TUBULAR ADENOMA OF COLON: ICD-10-CM

## 2021-08-23 PROCEDURE — 88305 TISSUE EXAM BY PATHOLOGIST: CPT | Performed by: PATHOLOGY

## 2021-08-23 PROCEDURE — 45385 COLONOSCOPY W/LESION REMOVAL: CPT | Performed by: INTERNAL MEDICINE

## 2021-08-23 RX ORDER — ONDANSETRON 2 MG/ML
4 INJECTION INTRAMUSCULAR; INTRAVENOUS ONCE AS NEEDED
Status: CANCELLED | OUTPATIENT
Start: 2021-08-23

## 2021-08-23 RX ORDER — MEPERIDINE HYDROCHLORIDE 25 MG/ML
12.5 INJECTION INTRAMUSCULAR; INTRAVENOUS; SUBCUTANEOUS
Status: CANCELLED | OUTPATIENT
Start: 2021-08-23

## 2021-08-23 RX ORDER — PROPOFOL 10 MG/ML
INJECTION, EMULSION INTRAVENOUS AS NEEDED
Status: DISCONTINUED | OUTPATIENT
Start: 2021-08-23 | End: 2021-08-23

## 2021-08-23 RX ORDER — HYDROMORPHONE HCL 110MG/55ML
0.5 PATIENT CONTROLLED ANALGESIA SYRINGE INTRAVENOUS
Status: CANCELLED | OUTPATIENT
Start: 2021-08-23

## 2021-08-23 RX ORDER — ALBUTEROL SULFATE 2.5 MG/3ML
2.5 SOLUTION RESPIRATORY (INHALATION) ONCE AS NEEDED
Status: CANCELLED | OUTPATIENT
Start: 2021-08-23

## 2021-08-23 RX ORDER — FENTANYL CITRATE/PF 50 MCG/ML
25 SYRINGE (ML) INJECTION
Status: CANCELLED | OUTPATIENT
Start: 2021-08-23

## 2021-08-23 RX ORDER — LIDOCAINE HYDROCHLORIDE 10 MG/ML
INJECTION, SOLUTION EPIDURAL; INFILTRATION; INTRACAUDAL; PERINEURAL AS NEEDED
Status: DISCONTINUED | OUTPATIENT
Start: 2021-08-23 | End: 2021-08-23

## 2021-08-23 RX ORDER — PROMETHAZINE HYDROCHLORIDE 25 MG/ML
12.5 INJECTION, SOLUTION INTRAMUSCULAR; INTRAVENOUS ONCE AS NEEDED
Status: CANCELLED | OUTPATIENT
Start: 2021-08-23

## 2021-08-23 RX ORDER — SODIUM CHLORIDE, SODIUM LACTATE, POTASSIUM CHLORIDE, CALCIUM CHLORIDE 600; 310; 30; 20 MG/100ML; MG/100ML; MG/100ML; MG/100ML
INJECTION, SOLUTION INTRAVENOUS CONTINUOUS PRN
Status: DISCONTINUED | OUTPATIENT
Start: 2021-08-23 | End: 2021-08-23

## 2021-08-23 RX ADMIN — PROPOFOL 50 MG: 10 INJECTION, EMULSION INTRAVENOUS at 11:27

## 2021-08-23 RX ADMIN — PROPOFOL 50 MG: 10 INJECTION, EMULSION INTRAVENOUS at 11:31

## 2021-08-23 RX ADMIN — PROPOFOL 50 MG: 10 INJECTION, EMULSION INTRAVENOUS at 11:36

## 2021-08-23 RX ADMIN — PROPOFOL 50 MG: 10 INJECTION, EMULSION INTRAVENOUS at 11:33

## 2021-08-23 RX ADMIN — LIDOCAINE HYDROCHLORIDE 50 MG: 10 INJECTION, SOLUTION EPIDURAL; INFILTRATION; INTRACAUDAL at 11:26

## 2021-08-23 RX ADMIN — PROPOFOL 50 MG: 10 INJECTION, EMULSION INTRAVENOUS at 11:29

## 2021-08-23 RX ADMIN — PROPOFOL 150 MG: 10 INJECTION, EMULSION INTRAVENOUS at 11:26

## 2021-08-23 RX ADMIN — PROPOFOL 50 MG: 10 INJECTION, EMULSION INTRAVENOUS at 11:38

## 2021-08-23 RX ADMIN — Medication 40 MG: at 11:31

## 2021-08-23 RX ADMIN — SODIUM CHLORIDE, SODIUM LACTATE, POTASSIUM CHLORIDE, AND CALCIUM CHLORIDE: .6; .31; .03; .02 INJECTION, SOLUTION INTRAVENOUS at 11:22

## 2021-08-23 NOTE — H&P
History and Physical - [facility]  Josh Mahan 54 y o  male MRN: [id number]    HPI: Josh Mahan is a 54y o  year old male who presents for surveillance colonoscopy  He had personal history of colon polyps  Last colonoscopy 5 years ago      Review of Systems    Historical Information   Past Medical History:   Diagnosis Date    High blood pressure     Hypercholesteremia     Hyperlipidemia     Hypertension     Prostatitis      Past Surgical History:   Procedure Laterality Date    APPENDECTOMY      CORONARY ANGIOPLASTY WITH STENT PLACEMENT  04/2013    LAD    NASAL SEPTUM SURGERY      [place] COLONOSCOPY FLX DX W/COLLJ SPEC WHEN PFRMD N/A 12/9/2016    Procedure: EGD AND COLONOSCOPY;  Surgeon: Aurelio Villarreal MD;  Location: [facility];   Service: Gastroenterology    SEPTOPLASTY       Social History   Social History     Substance and Sexual Activity   Alcohol Use Yes    Comment: "probably every day"  7 beers/day or equivalent     Social History     Substance and Sexual Activity   Drug Use No     Social History     Tobacco Use   Smoking Status Never Smoker   Smokeless Tobacco Never Used   Tobacco Comment    second hand smoke exposure (parents)     Family History   Problem Relation Age of Onset    Diverticulitis Mother     Colon cancer Mother         smoker    Depression Mother     Schizophrenia Mother         Paranoid    Hyperlipidemia Mother     Lung cancer Father         Smoker    Cancer Father         bladder    Neuropathy Father     Diabetes Father         no injections    Hyperlipidemia Father     No Known Problems Sister     No Known Problems Brother     Hyperlipidemia Sister     Diabetes Paternal Uncle     Lung cancer Paternal Uncle        Meds/Allergies     (Not in a hospital admission)      No Known Allergies    Objective     /87   Pulse 83   Temp 97 8 °F (36 6 °C) (Temporal)   Resp (!) 11   Ht 5' 11" (1 803 m)   Wt 92 5 kg (204 lb)   SpO2 98%   BMI 28 45 kg/m²       PHYSICAL EXAM    Gen: NAD  CV: RRR  CHEST: Clear  ABD: soft, NT/ND  EXT: no edema  Neuro: AAO      ASSESSMENT/PLAN:  This is a 54y o  year old male here for surveillance colonoscopy    PLAN:   Procedure:  Colonoscopy with biopsy and possible polypectomy

## 2021-08-23 NOTE — ANESTHESIA POSTPROCEDURE EVALUATION
Post-Op Assessment Note    CV Status:  Stable    Pain management: adequate     Mental Status:  Alert and awake   Hydration Status:  Euvolemic   PONV Controlled:  Controlled   Airway Patency:  Patent      Post Op Vitals Reviewed: Yes      Staff: CRNA         No complications documented      BP 94/64 (08/23/21 1149)    Temp   97 4   Pulse 79 (08/23/21 1149)   Resp 16 (08/23/21 1149)    SpO2 99 % (08/23/21 1149)

## 2021-08-23 NOTE — ANESTHESIA PREPROCEDURE EVALUATION
Procedure:  COLONOSCOPY    Relevant Problems   ANESTHESIA (within normal limits)      CARDIO   (+) Atherosclerotic heart disease of native coronary artery without angina pectoris   (+) Essential hypertension   (+) Mixed hyperlipidemia      ENDO (within normal limits)      GI/HEPATIC   (+) Fatty liver, alcoholic   (+) Gastroesophageal reflux disease without esophagitis   (+) Hiatal hernia      /RENAL (within normal limits)      GYN (within normal limits)      HEMATOLOGY (within normal limits)      MUSCULOSKELETAL (within normal limits)      NEURO/PSYCH (within normal limits)      PULMONARY (within normal limits)        Physical Exam    Airway    Mallampati score: I  TM Distance: >3 FB  Neck ROM: full     Dental   No notable dental hx     Cardiovascular  Cardiovascular exam normal    Pulmonary  Pulmonary exam normal     Other Findings        Anesthesia Plan  ASA Score- 2     Anesthesia Type- IV sedation with anesthesia with ASA Monitors  Additional Monitors:   Airway Plan:           Plan Factors-Exercise tolerance (METS): >4 METS  Chart reviewed  EKG reviewed  Imaging results reviewed  Existing labs reviewed  Patient summary reviewed  Induction- intravenous  Postoperative Plan- Plan for postoperative opioid use  Planned trial extubation    Informed Consent- Anesthetic plan and risks discussed with patient  I personally reviewed this patient with the CRNA  Discussed and agreed on the Anesthesia Plan with the CRNA  Danni Hawley

## 2021-09-29 ENCOUNTER — APPOINTMENT (OUTPATIENT)
Dept: LAB | Facility: CLINIC | Age: 56
End: 2021-09-29
Payer: COMMERCIAL

## 2021-09-29 DIAGNOSIS — R73.9 HYPERGLYCEMIA: ICD-10-CM

## 2021-09-29 DIAGNOSIS — E78.2 MIXED HYPERLIPIDEMIA: ICD-10-CM

## 2021-09-29 DIAGNOSIS — R79.89 ELEVATED LFTS: ICD-10-CM

## 2021-09-29 LAB
ALBUMIN SERPL BCP-MCNC: 3.5 G/DL (ref 3.5–5)
ALP SERPL-CCNC: 115 U/L (ref 46–116)
ALT SERPL W P-5'-P-CCNC: 62 U/L (ref 12–78)
ANION GAP SERPL CALCULATED.3IONS-SCNC: 7 MMOL/L (ref 4–13)
AST SERPL W P-5'-P-CCNC: 54 U/L (ref 5–45)
BILIRUB SERPL-MCNC: 0.63 MG/DL (ref 0.2–1)
BUN SERPL-MCNC: 14 MG/DL (ref 5–25)
CALCIUM SERPL-MCNC: 9.4 MG/DL (ref 8.3–10.1)
CHLORIDE SERPL-SCNC: 103 MMOL/L (ref 100–108)
CHOLEST SERPL-MCNC: 529 MG/DL (ref 50–200)
CO2 SERPL-SCNC: 25 MMOL/L (ref 21–32)
CREAT SERPL-MCNC: 1 MG/DL (ref 0.6–1.3)
EST. AVERAGE GLUCOSE BLD GHB EST-MCNC: 108 MG/DL
GFR SERPL CREATININE-BSD FRML MDRD: 84 ML/MIN/1.73SQ M
GLUCOSE P FAST SERPL-MCNC: 121 MG/DL (ref 65–99)
HBA1C MFR BLD: 5.4 %
HDLC SERPL-MCNC: 55 MG/DL
LDLC SERPL DIRECT ASSAY-MCNC: 212 MG/DL (ref 0–100)
POTASSIUM SERPL-SCNC: 4.1 MMOL/L (ref 3.5–5.3)
PROT SERPL-MCNC: 8 G/DL (ref 6.4–8.2)
SODIUM SERPL-SCNC: 135 MMOL/L (ref 136–145)
TRIGL SERPL-MCNC: 470 MG/DL

## 2021-09-29 PROCEDURE — 36415 COLL VENOUS BLD VENIPUNCTURE: CPT

## 2021-09-29 PROCEDURE — 83036 HEMOGLOBIN GLYCOSYLATED A1C: CPT

## 2021-09-29 PROCEDURE — 83721 ASSAY OF BLOOD LIPOPROTEIN: CPT

## 2021-09-29 PROCEDURE — 80061 LIPID PANEL: CPT

## 2021-09-29 PROCEDURE — 80053 COMPREHEN METABOLIC PANEL: CPT

## 2021-10-05 PROBLEM — J01.90 ACUTE NON-RECURRENT SINUSITIS: Status: RESOLVED | Noted: 2021-04-06 | Resolved: 2021-10-05

## 2021-10-15 ENCOUNTER — OFFICE VISIT (OUTPATIENT)
Dept: FAMILY MEDICINE CLINIC | Facility: CLINIC | Age: 56
End: 2021-10-15
Payer: COMMERCIAL

## 2021-10-15 VITALS
BODY MASS INDEX: 30.94 KG/M2 | HEIGHT: 71 IN | SYSTOLIC BLOOD PRESSURE: 112 MMHG | OXYGEN SATURATION: 97 % | WEIGHT: 221 LBS | DIASTOLIC BLOOD PRESSURE: 62 MMHG | HEART RATE: 88 BPM | RESPIRATION RATE: 16 BRPM

## 2021-10-15 DIAGNOSIS — K21.9 GASTROESOPHAGEAL REFLUX DISEASE WITHOUT ESOPHAGITIS: ICD-10-CM

## 2021-10-15 DIAGNOSIS — Z12.5 PROSTATE CANCER SCREENING: ICD-10-CM

## 2021-10-15 DIAGNOSIS — I10 HYPERTENSION, UNSPECIFIED TYPE: ICD-10-CM

## 2021-10-15 DIAGNOSIS — R79.89 ELEVATED LFTS: ICD-10-CM

## 2021-10-15 DIAGNOSIS — R73.9 HYPERGLYCEMIA: ICD-10-CM

## 2021-10-15 DIAGNOSIS — F10.20 ALCOHOLISM (HCC): ICD-10-CM

## 2021-10-15 DIAGNOSIS — E78.5 HYPERLIPIDEMIA, UNSPECIFIED HYPERLIPIDEMIA TYPE: ICD-10-CM

## 2021-10-15 DIAGNOSIS — K13.0 LIP LESION: ICD-10-CM

## 2021-10-15 DIAGNOSIS — I10 ESSENTIAL HYPERTENSION: Primary | ICD-10-CM

## 2021-10-15 DIAGNOSIS — E78.2 MIXED HYPERLIPIDEMIA: ICD-10-CM

## 2021-10-15 PROBLEM — Z20.822 ENCOUNTER BY TELEHEALTH FOR SUSPECTED COVID-19: Status: RESOLVED | Noted: 2021-04-06 | Resolved: 2021-10-15

## 2021-10-15 PROCEDURE — 3008F BODY MASS INDEX DOCD: CPT | Performed by: PHYSICIAN ASSISTANT

## 2021-10-15 PROCEDURE — 1036F TOBACCO NON-USER: CPT | Performed by: PHYSICIAN ASSISTANT

## 2021-10-15 PROCEDURE — 99214 OFFICE O/P EST MOD 30 MIN: CPT | Performed by: PHYSICIAN ASSISTANT

## 2021-10-15 RX ORDER — BISOPROLOL FUMARATE AND HYDROCHLOROTHIAZIDE 5; 6.25 MG/1; MG/1
1 TABLET ORAL DAILY
Qty: 90 TABLET | Refills: 3 | Status: SHIPPED | OUTPATIENT
Start: 2021-10-15 | End: 2021-11-02 | Stop reason: SDUPTHER

## 2021-10-15 RX ORDER — OMEPRAZOLE 40 MG/1
40 CAPSULE, DELAYED RELEASE ORAL
Qty: 90 CAPSULE | Refills: 3 | Status: SHIPPED | OUTPATIENT
Start: 2021-10-15 | End: 2021-11-02 | Stop reason: SDUPTHER

## 2021-10-15 RX ORDER — AMLODIPINE BESYLATE 10 MG/1
10 TABLET ORAL DAILY
Qty: 90 TABLET | Refills: 3 | Status: SHIPPED | OUTPATIENT
Start: 2021-10-15 | End: 2021-11-02 | Stop reason: SDUPTHER

## 2021-10-15 RX ORDER — LISINOPRIL 40 MG/1
40 TABLET ORAL DAILY
Qty: 90 TABLET | Refills: 3 | Status: SHIPPED | OUTPATIENT
Start: 2021-10-15 | End: 2021-11-02 | Stop reason: SDUPTHER

## 2021-10-15 RX ORDER — ROSUVASTATIN CALCIUM 10 MG/1
10 TABLET, COATED ORAL DAILY
Qty: 90 TABLET | Refills: 3 | Status: SHIPPED | OUTPATIENT
Start: 2021-10-15 | End: 2021-11-02 | Stop reason: SDUPTHER

## 2021-11-01 ENCOUNTER — TELEPHONE (OUTPATIENT)
Dept: FAMILY MEDICINE CLINIC | Facility: CLINIC | Age: 56
End: 2021-11-01

## 2021-11-02 DIAGNOSIS — E78.5 HYPERLIPIDEMIA, UNSPECIFIED HYPERLIPIDEMIA TYPE: ICD-10-CM

## 2021-11-02 DIAGNOSIS — I10 HYPERTENSION, UNSPECIFIED TYPE: ICD-10-CM

## 2021-11-02 DIAGNOSIS — E78.2 MIXED HYPERLIPIDEMIA: ICD-10-CM

## 2021-11-02 DIAGNOSIS — K21.9 GASTROESOPHAGEAL REFLUX DISEASE WITHOUT ESOPHAGITIS: ICD-10-CM

## 2021-11-02 RX ORDER — ROSUVASTATIN CALCIUM 10 MG/1
10 TABLET, COATED ORAL DAILY
Qty: 90 TABLET | Refills: 3 | Status: SHIPPED | OUTPATIENT
Start: 2021-11-02

## 2021-11-02 RX ORDER — LISINOPRIL 40 MG/1
40 TABLET ORAL DAILY
Qty: 90 TABLET | Refills: 3 | Status: SHIPPED | OUTPATIENT
Start: 2021-11-02

## 2021-11-02 RX ORDER — AMLODIPINE BESYLATE 10 MG/1
10 TABLET ORAL DAILY
Qty: 90 TABLET | Refills: 3 | Status: SHIPPED | OUTPATIENT
Start: 2021-11-02

## 2021-11-02 RX ORDER — OMEPRAZOLE 40 MG/1
40 CAPSULE, DELAYED RELEASE ORAL
Qty: 90 CAPSULE | Refills: 3 | Status: SHIPPED | OUTPATIENT
Start: 2021-11-02

## 2021-11-02 RX ORDER — BISOPROLOL FUMARATE AND HYDROCHLOROTHIAZIDE 5; 6.25 MG/1; MG/1
1 TABLET ORAL DAILY
Qty: 30 TABLET | Refills: 0 | Status: SHIPPED | OUTPATIENT
Start: 2021-11-02 | End: 2021-11-02 | Stop reason: SDUPTHER

## 2021-11-02 RX ORDER — BISOPROLOL FUMARATE AND HYDROCHLOROTHIAZIDE 5; 6.25 MG/1; MG/1
1 TABLET ORAL DAILY
Qty: 90 TABLET | Refills: 3 | Status: SHIPPED | OUTPATIENT
Start: 2021-11-02

## 2021-11-16 ENCOUNTER — TELEMEDICINE (OUTPATIENT)
Dept: FAMILY MEDICINE CLINIC | Facility: CLINIC | Age: 56
End: 2021-11-16
Payer: COMMERCIAL

## 2021-11-16 VITALS — BODY MASS INDEX: 30.1 KG/M2 | HEIGHT: 71 IN | WEIGHT: 215 LBS

## 2021-11-16 DIAGNOSIS — K04.7 DENTAL INFECTION: Primary | ICD-10-CM

## 2021-11-16 PROCEDURE — 3008F BODY MASS INDEX DOCD: CPT | Performed by: NURSE PRACTITIONER

## 2021-11-16 PROCEDURE — 99213 OFFICE O/P EST LOW 20 MIN: CPT | Performed by: NURSE PRACTITIONER

## 2021-11-16 PROCEDURE — 1036F TOBACCO NON-USER: CPT | Performed by: NURSE PRACTITIONER

## 2021-11-16 PROCEDURE — 3725F SCREEN DEPRESSION PERFORMED: CPT | Performed by: NURSE PRACTITIONER

## 2021-11-16 RX ORDER — AMOXICILLIN 500 MG/1
500 CAPSULE ORAL EVERY 8 HOURS SCHEDULED
Qty: 30 CAPSULE | Refills: 0 | Status: SHIPPED | OUTPATIENT
Start: 2021-11-16 | End: 2021-11-26

## 2022-10-27 ENCOUNTER — VBI (OUTPATIENT)
Dept: ADMINISTRATIVE | Facility: OTHER | Age: 57
End: 2022-10-27

## 2022-12-05 ENCOUNTER — APPOINTMENT (OUTPATIENT)
Dept: RADIOLOGY | Facility: MEDICAL CENTER | Age: 57
End: 2022-12-05

## 2022-12-05 ENCOUNTER — APPOINTMENT (OUTPATIENT)
Dept: URGENT CARE | Facility: MEDICAL CENTER | Age: 57
End: 2022-12-05
Payer: OTHER MISCELLANEOUS

## 2022-12-05 DIAGNOSIS — M25.562 ACUTE PAIN OF LEFT KNEE: ICD-10-CM

## 2022-12-05 DIAGNOSIS — M25.562 ACUTE PAIN OF LEFT KNEE: Primary | ICD-10-CM

## 2022-12-05 PROCEDURE — G0383 LEV 4 HOSP TYPE B ED VISIT: HCPCS | Performed by: FAMILY MEDICINE

## 2022-12-05 PROCEDURE — 99284 EMERGENCY DEPT VISIT MOD MDM: CPT | Performed by: FAMILY MEDICINE

## 2022-12-19 ENCOUNTER — VBI (OUTPATIENT)
Dept: ADMINISTRATIVE | Facility: OTHER | Age: 57
End: 2022-12-19

## 2022-12-20 ENCOUNTER — APPOINTMENT (OUTPATIENT)
Dept: URGENT CARE | Facility: MEDICAL CENTER | Age: 57
End: 2022-12-20

## 2023-03-13 ENCOUNTER — APPOINTMENT (OUTPATIENT)
Dept: LAB | Facility: CLINIC | Age: 58
End: 2023-03-13

## 2023-03-13 ENCOUNTER — OFFICE VISIT (OUTPATIENT)
Dept: FAMILY MEDICINE CLINIC | Facility: CLINIC | Age: 58
End: 2023-03-13

## 2023-03-13 VITALS
HEART RATE: 98 BPM | OXYGEN SATURATION: 100 % | BODY MASS INDEX: 29.82 KG/M2 | DIASTOLIC BLOOD PRESSURE: 100 MMHG | SYSTOLIC BLOOD PRESSURE: 188 MMHG | WEIGHT: 213.8 LBS

## 2023-03-13 DIAGNOSIS — Z13.0 SCREENING FOR DEFICIENCY ANEMIA: ICD-10-CM

## 2023-03-13 DIAGNOSIS — Z13.1 SCREENING FOR DIABETES MELLITUS: ICD-10-CM

## 2023-03-13 DIAGNOSIS — E78.2 MIXED HYPERLIPIDEMIA: ICD-10-CM

## 2023-03-13 DIAGNOSIS — Z13.89 SCREENING FOR HEMATURIA OR PROTEINURIA: ICD-10-CM

## 2023-03-13 DIAGNOSIS — E78.5 HYPERLIPIDEMIA, UNSPECIFIED HYPERLIPIDEMIA TYPE: ICD-10-CM

## 2023-03-13 DIAGNOSIS — Z12.5 SCREENING FOR PROSTATE CANCER: ICD-10-CM

## 2023-03-13 DIAGNOSIS — Z13.29 SCREENING FOR HYPOTHYROIDISM: ICD-10-CM

## 2023-03-13 DIAGNOSIS — Z23 NEED FOR TETANUS BOOSTER: ICD-10-CM

## 2023-03-13 DIAGNOSIS — Z76.89 ENCOUNTER TO ESTABLISH CARE: Primary | ICD-10-CM

## 2023-03-13 DIAGNOSIS — K21.9 GASTROESOPHAGEAL REFLUX DISEASE WITHOUT ESOPHAGITIS: ICD-10-CM

## 2023-03-13 DIAGNOSIS — I25.10 ATHEROSCLEROSIS OF NATIVE CORONARY ARTERY OF NATIVE HEART WITHOUT ANGINA PECTORIS: ICD-10-CM

## 2023-03-13 DIAGNOSIS — I10 ESSENTIAL HYPERTENSION: ICD-10-CM

## 2023-03-13 PROBLEM — K04.7 DENTAL INFECTION: Status: RESOLVED | Noted: 2021-11-16 | Resolved: 2023-03-13

## 2023-03-13 LAB
25(OH)D3 SERPL-MCNC: 16.8 NG/ML (ref 30–100)
ALBUMIN SERPL BCP-MCNC: 4 G/DL (ref 3.5–5)
ALP SERPL-CCNC: 104 U/L (ref 46–116)
ALT SERPL W P-5'-P-CCNC: 34 U/L (ref 12–78)
ANION GAP SERPL CALCULATED.3IONS-SCNC: 5 MMOL/L (ref 4–13)
AST SERPL W P-5'-P-CCNC: 24 U/L (ref 5–45)
BACTERIA UR QL AUTO: ABNORMAL /HPF
BASOPHILS # BLD AUTO: 0.04 THOUSANDS/ΜL (ref 0–0.1)
BASOPHILS NFR BLD AUTO: 1 % (ref 0–1)
BILIRUB SERPL-MCNC: 0.61 MG/DL (ref 0.2–1)
BILIRUB UR QL STRIP: NEGATIVE
BUN SERPL-MCNC: 11 MG/DL (ref 5–25)
CALCIUM SERPL-MCNC: 10.1 MG/DL (ref 8.3–10.1)
CAOX CRY URNS QL MICRO: ABNORMAL /HPF
CHLORIDE SERPL-SCNC: 101 MMOL/L (ref 96–108)
CHOLEST SERPL-MCNC: 336 MG/DL
CLARITY UR: CLEAR
CO2 SERPL-SCNC: 27 MMOL/L (ref 21–32)
COLOR UR: YELLOW
CREAT SERPL-MCNC: 1.09 MG/DL (ref 0.6–1.3)
EOSINOPHIL # BLD AUTO: 0.07 THOUSAND/ΜL (ref 0–0.61)
EOSINOPHIL NFR BLD AUTO: 1 % (ref 0–6)
ERYTHROCYTE [DISTWIDTH] IN BLOOD BY AUTOMATED COUNT: 12.4 % (ref 11.6–15.1)
EST. AVERAGE GLUCOSE BLD GHB EST-MCNC: 108 MG/DL
GFR SERPL CREATININE-BSD FRML MDRD: 74 ML/MIN/1.73SQ M
GLUCOSE P FAST SERPL-MCNC: 108 MG/DL (ref 65–99)
GLUCOSE UR STRIP-MCNC: NEGATIVE MG/DL
HBA1C MFR BLD: 5.4 %
HCT VFR BLD AUTO: 41.4 % (ref 36.5–49.3)
HDLC SERPL-MCNC: 54 MG/DL
HGB BLD-MCNC: 14.1 G/DL (ref 12–17)
HGB UR QL STRIP.AUTO: NEGATIVE
HYALINE CASTS #/AREA URNS LPF: ABNORMAL /LPF
IMM GRANULOCYTES # BLD AUTO: 0.01 THOUSAND/UL (ref 0–0.2)
IMM GRANULOCYTES NFR BLD AUTO: 0 % (ref 0–2)
KETONES UR STRIP-MCNC: NEGATIVE MG/DL
LEUKOCYTE ESTERASE UR QL STRIP: NEGATIVE
LYMPHOCYTES # BLD AUTO: 2.77 THOUSANDS/ΜL (ref 0.6–4.47)
LYMPHOCYTES NFR BLD AUTO: 37 % (ref 14–44)
MCH RBC QN AUTO: 30.7 PG (ref 26.8–34.3)
MCHC RBC AUTO-ENTMCNC: 34.1 G/DL (ref 31.4–37.4)
MCV RBC AUTO: 90 FL (ref 82–98)
MONOCYTES # BLD AUTO: 0.67 THOUSAND/ΜL (ref 0.17–1.22)
MONOCYTES NFR BLD AUTO: 9 % (ref 4–12)
MUCOUS THREADS UR QL AUTO: ABNORMAL
NEUTROPHILS # BLD AUTO: 3.87 THOUSANDS/ΜL (ref 1.85–7.62)
NEUTS SEG NFR BLD AUTO: 52 % (ref 43–75)
NITRITE UR QL STRIP: NEGATIVE
NON-SQ EPI CELLS URNS QL MICRO: ABNORMAL /HPF
NONHDLC SERPL-MCNC: 282 MG/DL
NRBC BLD AUTO-RTO: 0 /100 WBCS
PH UR STRIP.AUTO: 6.5 [PH]
PLATELET # BLD AUTO: 320 THOUSANDS/UL (ref 149–390)
PMV BLD AUTO: 10.6 FL (ref 8.9–12.7)
POTASSIUM SERPL-SCNC: 3.5 MMOL/L (ref 3.5–5.3)
PROT SERPL-MCNC: 8.1 G/DL (ref 6.4–8.4)
PROT UR STRIP-MCNC: ABNORMAL MG/DL
PSA SERPL-MCNC: 1.4 NG/ML (ref 0–4)
RBC # BLD AUTO: 4.6 MILLION/UL (ref 3.88–5.62)
RBC #/AREA URNS AUTO: ABNORMAL /HPF
SODIUM SERPL-SCNC: 133 MMOL/L (ref 135–147)
SP GR UR STRIP.AUTO: 1.02 (ref 1–1.03)
T4 FREE SERPL-MCNC: 0.91 NG/DL (ref 0.76–1.46)
TRIGL SERPL-MCNC: 578 MG/DL
TSH SERPL DL<=0.05 MIU/L-ACNC: 4.79 UIU/ML (ref 0.45–4.5)
UROBILINOGEN UR STRIP-ACNC: <2 MG/DL
WBC # BLD AUTO: 7.43 THOUSAND/UL (ref 4.31–10.16)
WBC #/AREA URNS AUTO: ABNORMAL /HPF

## 2023-03-13 RX ORDER — CARVEDILOL 12.5 MG/1
12.5 TABLET ORAL 2 TIMES DAILY WITH MEALS
Qty: 60 TABLET | Refills: 0 | Status: SHIPPED | OUTPATIENT
Start: 2023-03-13

## 2023-03-13 RX ORDER — OMEPRAZOLE 40 MG/1
40 CAPSULE, DELAYED RELEASE ORAL
Qty: 90 CAPSULE | Refills: 1 | Status: SHIPPED | OUTPATIENT
Start: 2023-03-13

## 2023-03-13 NOTE — ASSESSMENT & PLAN NOTE
Not controlled right now  He stopped his blood pressure medications 2 months ago  We will cautiously restart, we will do carvedilol 12 5 mg twice daily  He will continue to monitor his blood pressures at home  Follow-up in 3 weeks  He denies any complaints today

## 2023-03-13 NOTE — PATIENT INSTRUCTIONS
Go to [employer] buy [employer] upper arm large size blood pressure monitor  Check your blood pressure at home twice a day first time in the morning when you wake up second time during the day at your convenience  Write down your numbers on the piece of paper as a chart, I needed least 10 days of readings  Bring your blood pressure cuff and blood pressure readings in 2 to 3 weeks  Do blood work this week  I will let you know the results through [address]  We will follow-up in 3 weeks we will further decide on blood pressure medications and only on cholesterol pills  Limit your salt intake as much as you can  Use Tylenol arthritis 650 mg 1 pill 3 times a day every day  I also please try to use tumeric 1500 mg daily dose  Avoid any NSAIDs because of your blood pressure  You can use Voltaren gel topically

## 2023-03-13 NOTE — ASSESSMENT & PLAN NOTE
We will do lipid panel to see his baseline right now, in the past LDL was up to 200  Most likely we will restart statins

## 2023-03-13 NOTE — PROGRESS NOTES
Assessment/Plan:    Gastroesophageal reflux disease without esophagitis  Continue as needed PPIs  Atherosclerotic heart disease of native coronary artery without angina pectoris  Status post stent placement in the past   He stopped his statins recently, most likely it will be restarted  We will order lipid panel to see his baseline  Essential hypertension  Not controlled right now  He stopped his blood pressure medications 2 months ago  We will cautiously restart, we will do carvedilol 12 5 mg twice daily  He will continue to monitor his blood pressures at home  Follow-up in 3 weeks  He denies any complaints today  Mixed hyperlipidemia  We will do lipid panel to see his baseline right now, in the past LDL was up to 200  Most likely we will restart statins  Diagnoses and all orders for this visit:    Encounter to establish care    Need for tetanus booster  -     TDAP VACCINE GREATER THAN OR EQUAL TO 8YO IM    Screening for diabetes mellitus  -     Comprehensive metabolic panel; Future    Screening for hypothyroidism  -     TSH, 3rd generation with Free T4 reflex; Future    Screening for deficiency anemia  -     CBC and differential; Future    Hyperlipidemia, unspecified hyperlipidemia type  -     Lipid panel; Future  -     Vitamin D 25 hydroxy; Future    Essential hypertension  -     carvedilol (COREG) 12 5 mg tablet; Take 1 tablet (12 5 mg total) by mouth 2 (two) times a day with meals    Screening for hematuria or proteinuria  -     UA (URINE) with reflex to Scope  -     HEMOGLOBIN A1C W/ EAG ESTIMATION; Future    Screening for prostate cancer  -     PSA, Total Screen; Future    Gastroesophageal reflux disease without esophagitis  -     omeprazole (PriLOSEC) 40 MG capsule;  Take 1 capsule (40 mg total) by mouth daily before breakfast    Atherosclerosis of native coronary artery of native heart without angina pectoris    Mixed hyperlipidemia          Subjective:      Patient ID: Leela Shalonda Rigoberto Bustillo is a 62 y o  male  Patient came today to establish care  He has history of hyper lipidemia and hypertension, history of CAD status post stent placement and remote history  His blood pressure today rechecked by me 200/120, he stopped all his blood pressure medications few months ago  He will consider further vaccinations for tetanus and shingles  He does not smoke  Drinks alcohol occasionally  The following portions of the patient's history were reviewed and updated as appropriate: allergies, current medications, past family history, past medical history, past social history, past surgical history, and problem list     Review of Systems   Constitutional: Negative for chills, fatigue and fever  Respiratory: Negative for cough, chest tightness and shortness of breath  Cardiovascular: Negative for chest pain, palpitations and leg swelling  Gastrointestinal: Negative for abdominal distention, abdominal pain, blood in stool, constipation, diarrhea and nausea  Genitourinary: Negative for difficulty urinating and dysuria  Musculoskeletal: Positive for arthralgias  Negative for back pain, gait problem, joint swelling, myalgias and neck pain  Skin: Negative for rash  Neurological: Negative for dizziness, weakness, numbness and headaches  Psychiatric/Behavioral: Negative for agitation           Objective:      BP (!) 188/100 (BP Location: Left arm, Patient Position: Sitting, Cuff Size: Large)   Pulse 98   Wt 97 kg (213 lb 12 8 oz)   SpO2 100%   BMI 29 82 kg/m²     No Known Allergies       Current Outpatient Medications:   •  aspirin 81 MG tablet, Take 81 mg by mouth daily, Disp: , Rfl:   •  carvedilol (COREG) 12 5 mg tablet, Take 1 tablet (12 5 mg total) by mouth 2 (two) times a day with meals, Disp: 60 tablet, Rfl: 0  •  Diclofenac Sodium (VOLTAREN) 1 %, Apply 2 g topically 4 (four) times a day, Disp: 100 g, Rfl: 0  •  omeprazole (PriLOSEC) 40 MG capsule, Take 1 capsule (40 mg total) by mouth daily before breakfast, Disp: 90 capsule, Rfl: 1  •  amLODIPine (NORVASC) 10 mg tablet, Take 1 tablet (10 mg total) by mouth daily (Patient not taking: Reported on 3/13/2023), Disp: 90 tablet, Rfl: 3  •  rosuvastatin (CRESTOR) 10 MG tablet, Take 1 tablet (10 mg total) by mouth daily (Patient not taking: Reported on 3/13/2023), Disp: 90 tablet, Rfl: 3     Patient Instructions   Go to [employer] buy [employer] upper arm large size blood pressure monitor  Check your blood pressure at home twice a day first time in the morning when you wake up second time during the day at your convenience  Write down your numbers on the piece of paper as a chart, I needed least 10 days of readings  Bring your blood pressure cuff and blood pressure readings in 2 to 3 weeks  Do blood work this week  I will let you know the results through [address]  We will follow-up in 3 weeks we will further decide on blood pressure medications and only on cholesterol pills  Limit your salt intake as much as you can  Use Tylenol arthritis 650 mg 1 pill 3 times a day every day  I also please try to use tumeric 1500 mg daily dose  Avoid any NSAIDs because of your blood pressure  You can use Voltaren gel topically  Physical Exam  Constitutional:       Appearance: He is not ill-appearing  HENT:      Head: Normocephalic and atraumatic  Nose: No congestion or rhinorrhea  Eyes:      Pupils: Pupils are equal, round, and reactive to light  Cardiovascular:      Rate and Rhythm: Normal rate and regular rhythm  Pulses: Normal pulses  Heart sounds: Normal heart sounds  No murmur heard  No friction rub  No gallop  Pulmonary:      Effort: Pulmonary effort is normal  No respiratory distress  Breath sounds: Normal breath sounds  No wheezing or rales  Chest:      Chest wall: No tenderness  Abdominal:      General: Bowel sounds are normal  There is no distension  Palpations: Abdomen is soft   There is no mass  Tenderness: There is no abdominal tenderness  There is no rebound  Hernia: No hernia is present  Musculoskeletal:         General: No swelling, tenderness or deformity  Cervical back: No rigidity  No muscular tenderness  Skin:     Coloration: Skin is not pale  Findings: No erythema, lesion or rash  Neurological:      Mental Status: He is alert and oriented to person, place, and time  Sensory: No sensory deficit  Motor: No weakness     Psychiatric:         Mood and Affect: Mood normal          Behavior: Behavior normal

## 2023-04-03 ENCOUNTER — OFFICE VISIT (OUTPATIENT)
Dept: FAMILY MEDICINE CLINIC | Facility: CLINIC | Age: 58
End: 2023-04-03

## 2023-04-03 VITALS
DIASTOLIC BLOOD PRESSURE: 100 MMHG | OXYGEN SATURATION: 99 % | SYSTOLIC BLOOD PRESSURE: 160 MMHG | HEART RATE: 87 BPM | BODY MASS INDEX: 29.76 KG/M2 | WEIGHT: 213.4 LBS | TEMPERATURE: 99.6 F

## 2023-04-03 DIAGNOSIS — I10 ESSENTIAL HYPERTENSION: Primary | ICD-10-CM

## 2023-04-03 DIAGNOSIS — M25.50 POLYARTHRALGIA: ICD-10-CM

## 2023-04-03 DIAGNOSIS — E78.5 HYPERLIPIDEMIA, UNSPECIFIED HYPERLIPIDEMIA TYPE: ICD-10-CM

## 2023-04-03 RX ORDER — ROSUVASTATIN CALCIUM 20 MG/1
20 TABLET, COATED ORAL DAILY
Qty: 90 TABLET | Refills: 1 | Status: SHIPPED | OUTPATIENT
Start: 2023-04-03

## 2023-04-03 RX ORDER — CARVEDILOL 25 MG/1
25 TABLET ORAL 2 TIMES DAILY WITH MEALS
Qty: 180 TABLET | Refills: 2 | Status: SHIPPED | OUTPATIENT
Start: 2023-04-03

## 2023-04-03 RX ORDER — AMLODIPINE BESYLATE 10 MG/1
10 TABLET ORAL DAILY
Qty: 90 TABLET | Refills: 1 | Status: SHIPPED | OUTPATIENT
Start: 2023-04-03

## 2023-04-03 NOTE — ASSESSMENT & PLAN NOTE
Highly elevated triglyceride level and cholesterol  Start Crestor 20 mg daily  Recheck lipid panel in 8 weeks

## 2023-04-03 NOTE — ASSESSMENT & PLAN NOTE
He tried Tylenol arthritis with no improvement  Continue diclofenac gel that seems to be helping  Follow-up in few months  We will check inflammatory markers and Lyme disease serology

## 2023-04-03 NOTE — PROGRESS NOTES
Assessment/Plan:    Essential hypertension  Blood pressure rechecked by me 190/120, his blood pressure cuff seems to be reliable  His blood pressures at home are running around 460 systolic and 651 diastolic  Overall slightly improved on carvedilol 12 5 mg twice daily  Increase carvedilol up to 25 twice daily, add amlodipine 10 mg daily  He will update me on his blood pressure readings in few weeks  Hyperlipidemia  Highly elevated triglyceride level and cholesterol  Start Crestor 20 mg daily  Recheck lipid panel in 8 weeks  Polyarthralgia  He tried Tylenol arthritis with no improvement  Continue diclofenac gel that seems to be helping  Follow-up in few months  We will check inflammatory markers and Lyme disease serology  Diagnoses and all orders for this visit:    Essential hypertension  -     amLODIPine (NORVASC) 10 mg tablet; Take 1 tablet (10 mg total) by mouth daily  -     carvedilol (COREG) 25 mg tablet; Take 1 tablet (25 mg total) by mouth 2 (two) times a day with meals    Hyperlipidemia, unspecified hyperlipidemia type  -     rosuvastatin (CRESTOR) 20 MG tablet; Take 1 tablet (20 mg total) by mouth daily  -     Lipid panel; Future    Polyarthralgia  -     Sedimentation rate, automated; Future  -     C-reactive protein; Future  -     Lyme Antibody Profile with reflex to WB; Future          Subjective:      Patient ID: Cesar Shetty is a 62 y o  male  Patient came today for follow-up on his chronic problems including essential hypertension which is still uncontrolled  The following portions of the patient's history were reviewed and updated as appropriate: allergies, current medications, past family history, past medical history, past social history, past surgical history, and problem list     Review of Systems   Constitutional: Negative for chills, fatigue and fever  Respiratory: Negative for cough, chest tightness and shortness of breath      Cardiovascular: Negative for chest pain, palpitations and leg swelling  Gastrointestinal: Negative for abdominal distention, abdominal pain, blood in stool, constipation, diarrhea and nausea  Genitourinary: Negative for difficulty urinating and dysuria  Musculoskeletal: Positive for arthralgias  Negative for back pain, gait problem, joint swelling, myalgias and neck pain  Skin: Negative for rash  Neurological: Negative for dizziness, weakness, numbness and headaches  Psychiatric/Behavioral: Negative for agitation  Objective:      /100 (BP Location: Left arm, Patient Position: Sitting, Cuff Size: Large)   Pulse 87   Temp 99 6 °F (37 6 °C) (Tympanic)   Wt 96 8 kg (213 lb 6 4 oz)   SpO2 99%   BMI 29 76 kg/m²     No Known Allergies       Current Outpatient Medications:   •  amLODIPine (NORVASC) 10 mg tablet, Take 1 tablet (10 mg total) by mouth daily, Disp: 90 tablet, Rfl: 1  •  aspirin 81 MG tablet, Take 81 mg by mouth daily, Disp: , Rfl:   •  carvedilol (COREG) 25 mg tablet, Take 1 tablet (25 mg total) by mouth 2 (two) times a day with meals, Disp: 180 tablet, Rfl: 2  •  Diclofenac Sodium (VOLTAREN) 1 %, Apply 2 g topically 4 (four) times a day, Disp: 100 g, Rfl: 0  •  omeprazole (PriLOSEC) 40 MG capsule, Take 1 capsule (40 mg total) by mouth daily before breakfast, Disp: 90 capsule, Rfl: 1  •  rosuvastatin (CRESTOR) 20 MG tablet, Take 1 tablet (20 mg total) by mouth daily, Disp: 90 tablet, Rfl: 1     Patient Instructions   Please use rosuvastatin 20 mg every day at night  Please start amlodipine 10 mg in the morning watch for lower extremity swellings  Increase carvedilol up to 25 mg twice a day  Please continue to check your blood pressures at home and send me your numbers in 2 to 3 weeks  Please start vitamin D3 2000 units every day  Please do blood work in 8 weeks  Physical Exam  Vitals reviewed  Constitutional:       General: He is not in acute distress       Appearance: He is not toxic-appearing  HENT:      Head: Normocephalic  Cardiovascular:      Rate and Rhythm: Normal rate  Pulses: Normal pulses  Pulmonary:      Effort: Pulmonary effort is normal    Skin:     General: Skin is warm  Neurological:      General: No focal deficit present  Mental Status: He is alert     Psychiatric:         Mood and Affect: Mood normal          Behavior: Behavior normal

## 2023-04-03 NOTE — ASSESSMENT & PLAN NOTE
Blood pressure rechecked by me 190/120, his blood pressure cuff seems to be reliable  His blood pressures at home are running around 492 systolic and 777 diastolic  Overall slightly improved on carvedilol 12 5 mg twice daily  Increase carvedilol up to 25 twice daily, add amlodipine 10 mg daily  He will update me on his blood pressure readings in few weeks

## 2023-04-03 NOTE — PATIENT INSTRUCTIONS
Please use rosuvastatin 20 mg every day at night  Please start amlodipine 10 mg in the morning watch for lower extremity swellings  Increase carvedilol up to 25 mg twice a day  Please continue to check your blood pressures at home and send me your numbers in 2 to 3 weeks  Please start vitamin D3 2000 units every day  Please do blood work in 8 weeks

## 2023-07-19 ENCOUNTER — OFFICE VISIT (OUTPATIENT)
Dept: FAMILY MEDICINE CLINIC | Facility: CLINIC | Age: 58
End: 2023-07-19
Payer: COMMERCIAL

## 2023-07-19 VITALS
SYSTOLIC BLOOD PRESSURE: 132 MMHG | WEIGHT: 214 LBS | OXYGEN SATURATION: 99 % | DIASTOLIC BLOOD PRESSURE: 80 MMHG | HEIGHT: 71 IN | BODY MASS INDEX: 29.96 KG/M2 | HEART RATE: 78 BPM

## 2023-07-19 DIAGNOSIS — R10.84 DIFFUSE ABDOMINAL PAIN: ICD-10-CM

## 2023-07-19 DIAGNOSIS — M19.049 ARTHRITIS PAIN OF HAND: ICD-10-CM

## 2023-07-19 DIAGNOSIS — Z13.29 SCREENING FOR HYPOTHYROIDISM: ICD-10-CM

## 2023-07-19 DIAGNOSIS — R97.20 PSA ELEVATION: ICD-10-CM

## 2023-07-19 DIAGNOSIS — I10 ESSENTIAL HYPERTENSION: ICD-10-CM

## 2023-07-19 DIAGNOSIS — E87.1 HYPONATREMIA: Primary | ICD-10-CM

## 2023-07-19 PROCEDURE — 99214 OFFICE O/P EST MOD 30 MIN: CPT | Performed by: INTERNAL MEDICINE

## 2023-07-19 NOTE — ASSESSMENT & PLAN NOTE
Reports pain in his first [facility] joints bilaterally. We will consider intra-articular injection next visit.

## 2023-07-19 NOTE — ASSESSMENT & PLAN NOTE
Recently getting worse, patient is very uncomfortable. Tenderness to palpation mostly in left lower quadrant. We will do CT scan of the abdomen and pelvis with contrast to exclude any acute process.

## 2023-07-19 NOTE — PROGRESS NOTES
Assessment/Plan:    Essential hypertension  Blood pressure is very well controlled currently on carvedilol and amlodipine. Continue the same. Arthritis pain of hand  Reports pain in his first CMC joints bilaterally. We will consider intra-articular injection next visit. Diffuse abdominal pain  Recently getting worse, patient is very uncomfortable. Tenderness to palpation mostly in left lower quadrant. We will do CT scan of the abdomen and pelvis with contrast to exclude any acute process. Hyponatremia  Recheck BMP. PSA elevation  His PSA was slowly elevating recently, we will recheck it. Diagnoses and all orders for this visit:    Hyponatremia  -     Basic metabolic panel; Future    PSA elevation  -     PSA, Total Screen; Future    Screening for hypothyroidism  -     TSH, 3rd generation; Future    Diffuse abdominal pain  -     CT abdomen pelvis w contrast; Future    Essential hypertension    Arthritis pain of hand          Subjective:      Patient ID: Seema Ridley is a 62 y.o. male. Patient came today for follow-up on his chronic problems and with a new complaint of diffuse abdominal pain that is getting worse recently. Back Pain  Associated symptoms include abdominal pain. Pertinent negatives include no dysuria, fever, headaches or weight loss. Abdominal Pain  This is a chronic problem. The current episode started more than 1 year ago. The onset quality is undetermined. The problem occurs constantly. The most recent episode lasted 12 months. The problem has been waxing and waning. The pain is located in the suprapubic region. The pain is at a severity of 5/10. The quality of the pain is aching, cramping, dull, a sensation of fullness and sharp. The abdominal pain radiates to the LLQ, RLQ, suprapubic region, back, left flank, right flank and pelvis. Associated symptoms include arthralgias, belching, diarrhea, nausea and vomiting.  Pertinent negatives include no anorexia, constipation, dysuria, fever, flatus, frequency, headaches, hematochezia, hematuria, melena, myalgias or weight loss. The pain is aggravated by certain positions, eating and movement. The pain is relieved by belching, bowel movements and certain positions. Prior diagnostic workup includes lower endoscopy. The following portions of the patient's history were reviewed and updated as appropriate: allergies, current medications, past family history, past medical history, past social history, past surgical history, and problem list.    Review of Systems   Constitutional: Negative for chills, fever and weight loss. Gastrointestinal: Positive for abdominal pain, diarrhea, nausea and vomiting. Negative for anorexia, constipation, flatus, hematochezia and melena. Genitourinary: Negative for dysuria, frequency and hematuria. Musculoskeletal: Positive for arthralgias and back pain. Negative for myalgias. Neurological: Negative for headaches. Objective:      /80 (BP Location: Left arm, Patient Position: Sitting, Cuff Size: Large)   Pulse 78   Ht 5' 11" (1.803 m)   Wt 97.1 kg (214 lb)   SpO2 99%   BMI 29.85 kg/m²     No Known Allergies       Current Outpatient Medications:   •  amLODIPine (NORVASC) 10 mg tablet, Take 1 tablet (10 mg total) by mouth daily, Disp: 90 tablet, Rfl: 1  •  aspirin 81 MG tablet, Take 81 mg by mouth daily, Disp: , Rfl:   •  carvedilol (COREG) 25 mg tablet, Take 1 tablet (25 mg total) by mouth 2 (two) times a day with meals, Disp: 180 tablet, Rfl: 2  •  Diclofenac Sodium (VOLTAREN) 1 %, Apply 2 g topically 4 (four) times a day, Disp: 100 g, Rfl: 0  •  omeprazole (PriLOSEC) 40 MG capsule, Take 1 capsule (40 mg total) by mouth daily before breakfast, Disp: 90 capsule, Rfl: 1  •  rosuvastatin (CRESTOR) 20 MG tablet, Take 1 tablet (20 mg total) by mouth daily, Disp: 90 tablet, Rfl: 1     There are no Patient Instructions on file for this visit.         Physical Exam  Constitutional:       General: He is not in acute distress. Appearance: He is not ill-appearing or toxic-appearing. Cardiovascular:      Rate and Rhythm: Normal rate. Heart sounds: No murmur heard. No gallop. Pulmonary:      Effort: No respiratory distress. Breath sounds: No wheezing or rales. Abdominal:      General: There is distension. Tenderness: There is abdominal tenderness. There is no right CVA tenderness, left CVA tenderness or guarding. Hernia: No hernia is present. Musculoskeletal:         General: Tenderness present. Neurological:      Mental Status: He is alert.

## 2023-07-21 ENCOUNTER — APPOINTMENT (OUTPATIENT)
Dept: LAB | Facility: CLINIC | Age: 58
End: 2023-07-21
Payer: COMMERCIAL

## 2023-07-21 DIAGNOSIS — E87.1 HYPONATREMIA: ICD-10-CM

## 2023-07-21 DIAGNOSIS — M25.50 POLYARTHRALGIA: ICD-10-CM

## 2023-07-21 DIAGNOSIS — R97.20 PSA ELEVATION: ICD-10-CM

## 2023-07-21 DIAGNOSIS — E78.5 HYPERLIPIDEMIA, UNSPECIFIED HYPERLIPIDEMIA TYPE: ICD-10-CM

## 2023-07-21 DIAGNOSIS — Z13.29 SCREENING FOR HYPOTHYROIDISM: ICD-10-CM

## 2023-07-21 LAB
ANION GAP SERPL CALCULATED.3IONS-SCNC: 6 MMOL/L
B BURGDOR IGG+IGM SER-ACNC: 0.9 AI
BUN SERPL-MCNC: 16 MG/DL (ref 5–25)
CALCIUM SERPL-MCNC: 9.5 MG/DL (ref 8.3–10.1)
CHLORIDE SERPL-SCNC: 104 MMOL/L (ref 96–108)
CHOLEST SERPL-MCNC: 268 MG/DL
CO2 SERPL-SCNC: 26 MMOL/L (ref 21–32)
CREAT SERPL-MCNC: 1.09 MG/DL (ref 0.6–1.3)
CRP SERPL QL: 5.2 MG/L
ERYTHROCYTE [SEDIMENTATION RATE] IN BLOOD: 59 MM/HOUR (ref 0–19)
GFR SERPL CREATININE-BSD FRML MDRD: 74 ML/MIN/1.73SQ M
GLUCOSE P FAST SERPL-MCNC: 110 MG/DL (ref 65–99)
HDLC SERPL-MCNC: 56 MG/DL
NONHDLC SERPL-MCNC: 212 MG/DL
POTASSIUM SERPL-SCNC: 4.1 MMOL/L (ref 3.5–5.3)
PSA SERPL-MCNC: 0.66 NG/ML (ref 0–4)
SODIUM SERPL-SCNC: 136 MMOL/L (ref 135–147)
TRIGL SERPL-MCNC: 401 MG/DL
TSH SERPL DL<=0.05 MIU/L-ACNC: 3.38 UIU/ML (ref 0.45–4.5)

## 2023-07-21 PROCEDURE — 86140 C-REACTIVE PROTEIN: CPT

## 2023-07-21 PROCEDURE — 80048 BASIC METABOLIC PNL TOTAL CA: CPT

## 2023-07-21 PROCEDURE — 84443 ASSAY THYROID STIM HORMONE: CPT

## 2023-07-21 PROCEDURE — 85652 RBC SED RATE AUTOMATED: CPT

## 2023-07-21 PROCEDURE — 80061 LIPID PANEL: CPT

## 2023-07-21 PROCEDURE — 36415 COLL VENOUS BLD VENIPUNCTURE: CPT

## 2023-07-21 PROCEDURE — 86618 LYME DISEASE ANTIBODY: CPT

## 2023-07-21 PROCEDURE — G0103 PSA SCREENING: HCPCS

## 2023-07-21 PROCEDURE — 86617 LYME DISEASE ANTIBODY: CPT

## 2023-07-24 LAB
B BURGDOR IGG PATRN SER IB-IMP: NEGATIVE
B BURGDOR IGM PATRN SER IB-IMP: NEGATIVE
B BURGDOR18KD IGG SER QL IB: ABNORMAL
B BURGDOR23KD IGG SER QL IB: ABNORMAL
B BURGDOR23KD IGM SER QL IB: PRESENT
B BURGDOR28KD IGG SER QL IB: ABNORMAL
B BURGDOR30KD IGG SER QL IB: ABNORMAL
B BURGDOR39KD IGG SER QL IB: ABNORMAL
B BURGDOR39KD IGM SER QL IB: ABNORMAL
B BURGDOR41KD IGG SER QL IB: ABNORMAL
B BURGDOR41KD IGM SER QL IB: ABNORMAL
B BURGDOR45KD IGG SER QL IB: ABNORMAL
B BURGDOR58KD IGG SER QL IB: ABNORMAL
B BURGDOR66KD IGG SER QL IB: ABNORMAL
B BURGDOR93KD IGG SER QL IB: ABNORMAL

## 2023-07-26 DIAGNOSIS — E78.5 HYPERLIPIDEMIA, UNSPECIFIED HYPERLIPIDEMIA TYPE: Primary | ICD-10-CM

## 2023-07-26 RX ORDER — ROSUVASTATIN CALCIUM 40 MG/1
40 TABLET, COATED ORAL DAILY
Qty: 90 TABLET | Refills: 3 | Status: SHIPPED | OUTPATIENT
Start: 2023-07-26

## 2023-08-01 ENCOUNTER — PROCEDURE VISIT (OUTPATIENT)
Dept: FAMILY MEDICINE CLINIC | Facility: CLINIC | Age: 58
End: 2023-08-01
Payer: COMMERCIAL

## 2023-08-01 ENCOUNTER — HOSPITAL ENCOUNTER (OUTPATIENT)
Dept: CT IMAGING | Facility: HOSPITAL | Age: 58
Discharge: HOME | End: 2023-08-01
Attending: INTERNAL MEDICINE
Payer: COMMERCIAL

## 2023-08-01 VITALS
OXYGEN SATURATION: 96 % | BODY MASS INDEX: 30.21 KG/M2 | DIASTOLIC BLOOD PRESSURE: 58 MMHG | SYSTOLIC BLOOD PRESSURE: 104 MMHG | HEIGHT: 71 IN | WEIGHT: 215.8 LBS | TEMPERATURE: 98 F | RESPIRATION RATE: 12 BRPM | HEART RATE: 81 BPM

## 2023-08-01 DIAGNOSIS — R10.84 DIFFUSE ABDOMINAL PAIN: ICD-10-CM

## 2023-08-01 DIAGNOSIS — M19.049 CMC ARTHRITIS: Primary | ICD-10-CM

## 2023-08-01 PROCEDURE — 74177 CT ABD & PELVIS W/CONTRAST: CPT

## 2023-08-01 PROCEDURE — 20600 DRAIN/INJ JOINT/BURSA W/O US: CPT | Performed by: INTERNAL MEDICINE

## 2023-08-01 PROCEDURE — 99214 OFFICE O/P EST MOD 30 MIN: CPT | Performed by: INTERNAL MEDICINE

## 2023-08-01 PROCEDURE — G1004 CDSM NDSC: HCPCS

## 2023-08-01 RX ADMIN — IOHEXOL 100 ML: 350 INJECTION, SOLUTION INTRAVENOUS at 18:47

## 2023-08-01 NOTE — LETTER
August 1, 2023     Patient: Preeti Rogers  YOB: 1965  Date of Visit: 8/1/2023      To Whom it May Concern:    Kyung Dowd is under my professional care. Juni Hatfield was seen in my office on 8/1/2023. Juni Hatfield will be excused from work starting on 8/1/2023 until 8/4/2023. Okay to go back to work on 8/7/2023. If you have any questions or concerns, please don't hesitate to call.          Sincerely,          Ulysses Goodwill, MD        CC: No Recipients

## 2023-08-01 NOTE — PROGRESS NOTES
Assessment/Plan:    No problem-specific Assessment & Plan notes found for this encounter. Diagnoses and all orders for this visit:    [facility] arthritis    Other orders  -     Small joint arthrocentesis  -     Small joint arthrocentesis      Small joint arthrocentesis: R thumb CMC  [place] Protocol:  Consent: Verbal consent obtained. Consent given by: patient  Time out: Immediately prior to procedure a "time out" was called to verify the correct patient, procedure, equipment, support staff and site/side marked as required. Timeout called at: 8/1/2023 12:08 PM.  Patient understanding: patient states understanding of the procedure being performed  Patient identity confirmed: verbally with patient    Supporting Documentation  Indications: pain   Procedure Details  Location: thumb - R thumb CMC  Needle size: 22 G  Ultrasound guidance: no    Patient tolerance: patient tolerated the procedure well with no immediate complications    20 mg of triamcinolone mixed with half cc of 2% lidocaine injected into right thumb CMC, sterile technique was used. If any redness, worsening of the pain he will update me immediately. He will refrain from any physical work for 3 days. Small joint arthrocentesis: L thumb CMC  [place] Protocol:  Consent: Verbal consent obtained. Consent given by: patient  Time out: Immediately prior to procedure a "time out" was called to verify the correct patient, procedure, equipment, support staff and site/side marked as required.   Timeout called at: 8/1/2023 12:07 PM.  Patient understanding: patient states understanding of the procedure being performed  Patient identity confirmed: verbally with patient    Supporting Documentation  Indications: pain   Procedure Details  Location: thumb - L thumb CMC  Preparation: Patient was prepped and draped in the usual sterile fashion  Needle size: 22 G  Ultrasound guidance: no    Patient tolerance: patient tolerated the procedure well with no immediate complications    20 mg of triamcinolone mixed with half cc of 2% lidocaine injected into left thumb CMC, sterile technique was used. If any redness, worsening of the pain he will update me immediately. He will refrain from any physical work for 3 days. Subjective:      Patient ID: Rosalie Horn is a 62 y.o. male. Patient came today for intra-articular joint injection into his bilateral first CMC joints. The following portions of the patient's history were reviewed and updated as appropriate: allergies, current medications, past family history, past medical history, past social history, past surgical history, and problem list.    Review of Systems   Musculoskeletal: Positive for arthralgias. Skin: Negative for color change. Objective:      /58 (BP Location: Left arm, Patient Position: Sitting, Cuff Size: Large)   Pulse 81   Temp 98 °F (36.7 °C) (Temporal)   Resp 12   Ht 5' 11" (1.803 m)   Wt 97.9 kg (215 lb 12.8 oz)   SpO2 96%   BMI 30.10 kg/m²     No Known Allergies       Current Outpatient Medications:   •  amLODIPine (NORVASC) 10 mg tablet, Take 1 tablet (10 mg total) by mouth daily, Disp: 90 tablet, Rfl: 1  •  aspirin 81 MG tablet, Take 81 mg by mouth daily, Disp: , Rfl:   •  carvedilol (COREG) 25 mg tablet, Take 1 tablet (25 mg total) by mouth 2 (two) times a day with meals, Disp: 180 tablet, Rfl: 2  •  Diclofenac Sodium (VOLTAREN) 1 %, Apply 2 g topically 4 (four) times a day, Disp: 100 g, Rfl: 0  •  omeprazole (PriLOSEC) 40 MG capsule, Take 1 capsule (40 mg total) by mouth daily before breakfast, Disp: 90 capsule, Rfl: 1  •  rosuvastatin (CRESTOR) 40 MG tablet, Take 1 tablet (40 mg total) by mouth daily, Disp: 90 tablet, Rfl: 3     There are no Patient Instructions on file for this visit. Physical Exam  Musculoskeletal:         General: Tenderness present.

## 2023-08-28 ENCOUNTER — HOSPITAL ENCOUNTER (EMERGENCY)
Facility: HOSPITAL | Age: 58
Discharge: HOME | End: 2023-08-28
Attending: EMERGENCY MEDICINE | Admitting: EMERGENCY MEDICINE
Payer: COMMERCIAL

## 2023-08-28 ENCOUNTER — APPOINTMENT (EMERGENCY)
Dept: RADIOLOGY | Facility: HOSPITAL | Age: 58
End: 2023-08-28
Payer: COMMERCIAL

## 2023-08-28 VITALS
SYSTOLIC BLOOD PRESSURE: 141 MMHG | HEART RATE: 93 BPM | DIASTOLIC BLOOD PRESSURE: 85 MMHG | RESPIRATION RATE: 17 BRPM | OXYGEN SATURATION: 96 % | TEMPERATURE: 98.5 F

## 2023-08-28 DIAGNOSIS — R07.89 ATYPICAL CHEST PAIN: Primary | ICD-10-CM

## 2023-08-28 LAB
ANION GAP SERPL CALCULATED.3IONS-SCNC: 10 MMOL/L
ATRIAL RATE: 91 BPM
ATRIAL RATE: 99 BPM
BASOPHILS # BLD AUTO: 0.02 THOUSANDS/ÂΜL (ref 0–0.1)
BASOPHILS NFR BLD AUTO: 0 % (ref 0–1)
BUN SERPL-MCNC: 14 MG/DL (ref 5–25)
CALCIUM SERPL-MCNC: 8.8 MG/DL (ref 8.4–10.2)
CARDIAC TROPONIN I PNL SERPL HS: <2 NG/L
CARDIAC TROPONIN I PNL SERPL HS: <2 NG/L
CHLORIDE SERPL-SCNC: 102 MMOL/L (ref 96–108)
CO2 SERPL-SCNC: 24 MMOL/L (ref 21–32)
CREAT SERPL-MCNC: 1.07 MG/DL (ref 0.6–1.3)
D DIMER PPP FEU-MCNC: 0.46 UG/ML FEU
EOSINOPHIL # BLD AUTO: 0.03 THOUSAND/ÂΜL (ref 0–0.61)
EOSINOPHIL NFR BLD AUTO: 1 % (ref 0–6)
ERYTHROCYTE [DISTWIDTH] IN BLOOD BY AUTOMATED COUNT: 12.9 % (ref 11.6–15.1)
GFR SERPL CREATININE-BSD FRML MDRD: 76 ML/MIN/1.73SQ M
GLUCOSE SERPL-MCNC: 151 MG/DL (ref 65–140)
HCT VFR BLD AUTO: 37.7 % (ref 36.5–49.3)
HGB BLD-MCNC: 12.8 G/DL (ref 12–17)
IMM GRANULOCYTES # BLD AUTO: 0.01 THOUSAND/UL (ref 0–0.2)
IMM GRANULOCYTES NFR BLD AUTO: 0 % (ref 0–2)
LYMPHOCYTES # BLD AUTO: 1.51 THOUSANDS/ÂΜL (ref 0.6–4.47)
LYMPHOCYTES NFR BLD AUTO: 29 % (ref 14–44)
MCH RBC QN AUTO: 32 PG (ref 26.8–34.3)
MCHC RBC AUTO-ENTMCNC: 34 G/DL (ref 31.4–37.4)
MCV RBC AUTO: 94 FL (ref 82–98)
MONOCYTES # BLD AUTO: 0.48 THOUSAND/ÂΜL (ref 0.17–1.22)
MONOCYTES NFR BLD AUTO: 9 % (ref 4–12)
NEUTROPHILS # BLD AUTO: 3.13 THOUSANDS/ÂΜL (ref 1.85–7.62)
NEUTS SEG NFR BLD AUTO: 61 % (ref 43–75)
NRBC BLD AUTO-RTO: 0 /100 WBCS
P AXIS: 47 DEGREES
P AXIS: 54 DEGREES
PLATELET # BLD AUTO: 155 THOUSANDS/UL (ref 149–390)
PMV BLD AUTO: 9 FL (ref 8.9–12.7)
POTASSIUM SERPL-SCNC: 3.6 MMOL/L (ref 3.5–5.3)
PR INTERVAL: 162 MS
PR INTERVAL: 162 MS
QRS AXIS: 14 DEGREES
QRS AXIS: 17 DEGREES
QRSD INTERVAL: 66 MS
QRSD INTERVAL: 70 MS
QT INTERVAL: 334 MS
QT INTERVAL: 354 MS
QTC INTERVAL: 428 MS
QTC INTERVAL: 435 MS
RBC # BLD AUTO: 4 MILLION/UL (ref 3.88–5.62)
SODIUM SERPL-SCNC: 136 MMOL/L (ref 135–147)
T WAVE AXIS: 12 DEGREES
T WAVE AXIS: 17 DEGREES
VENTRICULAR RATE: 91 BPM
VENTRICULAR RATE: 99 BPM
WBC # BLD AUTO: 5.18 THOUSAND/UL (ref 4.31–10.16)

## 2023-08-28 PROCEDURE — 96374 THER/PROPH/DIAG INJ IV PUSH: CPT

## 2023-08-28 PROCEDURE — 80048 BASIC METABOLIC PNL TOTAL CA: CPT | Performed by: EMERGENCY MEDICINE

## 2023-08-28 PROCEDURE — 84484 ASSAY OF TROPONIN QUANT: CPT | Performed by: EMERGENCY MEDICINE

## 2023-08-28 PROCEDURE — 36415 COLL VENOUS BLD VENIPUNCTURE: CPT | Performed by: EMERGENCY MEDICINE

## 2023-08-28 PROCEDURE — 93005 ELECTROCARDIOGRAM TRACING: CPT

## 2023-08-28 PROCEDURE — 99285 EMERGENCY DEPT VISIT HI MDM: CPT | Performed by: EMERGENCY MEDICINE

## 2023-08-28 PROCEDURE — 99285 EMERGENCY DEPT VISIT HI MDM: CPT

## 2023-08-28 PROCEDURE — 85379 FIBRIN DEGRADATION QUANT: CPT | Performed by: EMERGENCY MEDICINE

## 2023-08-28 PROCEDURE — 71046 X-RAY EXAM CHEST 2 VIEWS: CPT

## 2023-08-28 PROCEDURE — 85025 COMPLETE CBC W/AUTO DIFF WBC: CPT | Performed by: EMERGENCY MEDICINE

## 2023-08-28 PROCEDURE — 93010 ELECTROCARDIOGRAM REPORT: CPT | Performed by: STUDENT IN AN ORGANIZED HEALTH CARE EDUCATION/TRAINING PROGRAM

## 2023-08-28 RX ORDER — NITROGLYCERIN 0.4 MG/1
0.4 TABLET SUBLINGUAL ONCE
Status: COMPLETED | OUTPATIENT
Start: 2023-08-28 | End: 2023-08-28

## 2023-08-28 RX ORDER — NITROGLYCERIN 0.4 MG/1
0.4 TABLET SUBLINGUAL
Qty: 30 TABLET | Refills: 0 | Status: SHIPPED | OUTPATIENT
Start: 2023-08-28

## 2023-08-28 RX ORDER — KETOROLAC TROMETHAMINE 30 MG/ML
15 INJECTION, SOLUTION INTRAMUSCULAR; INTRAVENOUS ONCE
Status: COMPLETED | OUTPATIENT
Start: 2023-08-28 | End: 2023-08-28

## 2023-08-28 RX ADMIN — NITROGLYCERIN 0.4 MG: 0.4 TABLET SUBLINGUAL at 15:00

## 2023-08-28 RX ADMIN — KETOROLAC TROMETHAMINE 15 MG: 30 INJECTION, SOLUTION INTRAMUSCULAR; INTRAVENOUS at 15:33

## 2023-08-28 NOTE — ED PROVIDER NOTES
History  Chief Complaint   Patient presents with   • Chest Pain     Pt reports chest pain and sob since today. A 78-year-old male with past medical history of CAD (s/p LAD stent in 2013), hypertension and hyperlipidemia; presents with chest pain. Patient states he has had intermittent mild left-sided chest discomfort for the past two days, however pain worsened this morning. Pain has been constant since onset this morning. Pain is nonradiating. He has had intermittent shortness of breath with the chest pain. He has been unable to identify any exacerbating factors for his symptoms. He has not had fever, chills, cough, nausea, vomiting, diarrhea, peripheral edema and rashes. He does report chronic abdominal pain over the past several months, recently undergoing a CT scan (8/1/23)  which was negative for acute pathology. A/P: Chest pain, associated with shortness of breath. EKG without acute ST changes. Symptoms atypical for ACS, however pt does have history of CAD. Will trend troponins. Due to atypical symptoms, along with borderline tachycardia (HR in the 90's) will obtain dimer to evaluate for PE. History provided by:  Patient and medical records  Chest Pain  Associated symptoms: shortness of breath        Prior to Admission Medications   Prescriptions Last Dose Informant Patient Reported? Taking?    Diclofenac Sodium (VOLTAREN) 1 %  Self No No   Sig: Apply 2 g topically 4 (four) times a day   amLODIPine (NORVASC) 10 mg tablet  Self No No   Sig: Take 1 tablet (10 mg total) by mouth daily   aspirin 81 MG tablet  Self Yes No   Sig: Take 81 mg by mouth daily   carvedilol (COREG) 25 mg tablet  Self No No   Sig: Take 1 tablet (25 mg total) by mouth 2 (two) times a day with meals   omeprazole (PriLOSEC) 40 MG capsule  Self No No   Sig: Take 1 capsule (40 mg total) by mouth daily before breakfast   rosuvastatin (CRESTOR) 40 MG tablet  Self No No   Sig: Take 1 tablet (40 mg total) by mouth daily Facility-Administered Medications: None       Past Medical History:   Diagnosis Date   • High blood pressure    • Hypercholesteremia    • Hyperlipidemia    • Hypertension    • Prostatitis        Past Surgical History:   Procedure Laterality Date   • APPENDECTOMY     • CORONARY ANGIOPLASTY WITH STENT PLACEMENT  04/2013    LAD   • NASAL SEPTUM SURGERY     • SC COLONOSCOPY FLX DX W/COLLJ SPEC WHEN PFRMD N/A 12/9/2016    Procedure: EGD AND COLONOSCOPY;  Surgeon: Fabby Myers MD;  Location: [facility]; Service: Gastroenterology   • SEPTOPLASTY         Family History   Problem Relation Age of Onset   • Diverticulitis Mother    • Colon cancer Mother         smoker   • Depression Mother    • Schizophrenia Mother         Paranoid   • Hyperlipidemia Mother    • Lung cancer Father         Smoker   • Cancer Father         bladder   • Neuropathy Father    • Diabetes Father         no injections   • Hyperlipidemia Father    • No Known Problems Sister    • Hyperlipidemia Sister    • Brain cancer Brother    • Diabetes Paternal Uncle    • Lung cancer Paternal Uncle      I have reviewed and agree with the history as documented. E-Cigarette/Vaping   • E-Cigarette Use Never User      E-Cigarette/Vaping Substances   • Nicotine No    • THC No    • CBD No    • Flavoring No    • Other No    • Unknown No      Social History     Tobacco Use   • Smoking status: Never   • Smokeless tobacco: Never   • Tobacco comments:     second hand smoke exposure (parents)   Vaping Use   • Vaping Use: Never used   Substance Use Topics   • Alcohol use: Yes     Comment: "probably every day"  7 beers/day or equivalent   • Drug use: No       Review of Systems   Respiratory: Positive for shortness of breath. Cardiovascular: Positive for chest pain. All other systems reviewed and are negative. Physical Exam  Physical Exam  General Appearance: alert and oriented, nad, non toxic appearing  Skin:  Warm, dry, intact.   No cyanosis  HEENT: Atraumatic, normocephalic. No eye drainage. Normal hearing. Moist mucous membranes. Neck: Supple, trachea midline  Cardiac: RRR; no murmurs, rub, gallops. No pedal edema, 2+ pulses  Pulmonary: lungs CTAB; no wheezes, rales, rhonchi  Gastrointestinal: abdomen soft, nontender, nondistended; no guarding or rebound tenderness; good bowel sounds, no mass or bruits  Extremities:  No deformities.   No calf tenderness, no clubbing  Neuro:  no focal motor or sensory deficits, CN 2-12 grossly intact  Psych:  Normal mood and affect, normal judgement and insight      Vital Signs  ED Triage Vitals   Temperature Pulse Respirations Blood Pressure SpO2   08/28/23 1225 08/28/23 1225 08/28/23 1225 08/28/23 1225 08/28/23 1225   98.5 °F (36.9 °C) 98 20 157/91 98 %      Temp Source Heart Rate Source Patient Position - Orthostatic VS BP Location FiO2 (%)   08/28/23 1225 08/28/23 1225 08/28/23 1300 08/28/23 1225 --   Oral Monitor Lying Right arm       Pain Score       08/28/23 1533       2           Vitals:    08/28/23 1300 08/28/23 1427 08/28/23 1500 08/28/23 1609   BP: 161/99 (!) 135/101 145/90 141/85   Pulse: 98 94 92 93   Patient Position - Orthostatic VS: Lying Lying Lying Sitting         Visual Acuity      ED Medications  Medications   nitroglycerin (NITROSTAT) SL tablet 0.4 mg (0.4 mg Sublingual Given 8/28/23 1500)   ketorolac (TORADOL) injection 15 mg (15 mg Intravenous Given 8/28/23 1533)       Diagnostic Studies  Results Reviewed     Procedure Component Value Units Date/Time    HS Troponin I 2hr [[id number]] Collected: 08/28/23 1447    Lab Status: Final result Specimen: Blood from Arm, Left Updated: 08/28/23 1516     hs TnI 2hr <2 ng/L      Delta 2hr hsTnI --    HS Troponin 0hr (reflex protocol) [[id number]]  (Normal) Collected: 08/28/23 1246    Lab Status: Final result Specimen: Blood from Arm, Left Updated: 08/28/23 1317     hs TnI 0hr <2 ng/L     Basic metabolic panel [[id number]]  (Abnormal) Collected: 08/28/23 4566    Lab Status: Final result Specimen: Blood from Arm, Left Updated: 08/28/23 1312     Sodium 136 mmol/L      Potassium 3.6 mmol/L      Chloride 102 mmol/L      CO2 24 mmol/L      ANION GAP 10 mmol/L      BUN 14 mg/dL      Creatinine 1.07 mg/dL      Glucose 151 mg/dL      Calcium 8.8 mg/dL      eGFR 76 ml/min/1.73sq m     Narrative:      [facility] guidelines for Chronic Kidney Disease (CKD):   •  Stage 1 with normal or high GFR (GFR > 90 mL/min/1.73 square meters)  •  Stage 2 Mild CKD (GFR = 60-89 mL/min/1.73 square meters)  •  Stage 3A Moderate CKD (GFR = 45-59 mL/min/1.73 square meters)  •  Stage 3B Moderate CKD (GFR = 30-44 mL/min/1.73 square meters)  •  Stage 4 Severe CKD (GFR = 15-29 mL/min/1.73 square meters)  •  Stage 5 End Stage CKD (GFR <15 mL/min/1.73 square meters)  Note: GFR calculation is accurate only with a steady state creatinine    D-Dimer [[id number]]  (Normal) Collected: 08/28/23 1248    Lab Status: Final result Specimen: Blood from Arm, Left Updated: 08/28/23 1311     D-Dimer, Quant 0.46 ug/ml FEU     Narrative: In the evaluation for possible pulmonary embolism, in the appropriate (Well's Score of 4 or less) patient, the age adjusted d-dimer cutoff for this patient can be calculated as:    Age x 0.01 (in ug/mL) for Age-adjusted D-dimer exclusion threshold for a patient over 50 years.     CBC and differential [[id number]] Collected: 08/28/23 1246    Lab Status: Final result Specimen: Blood from Arm, Left Updated: 08/28/23 1255     WBC 5.18 Thousand/uL      RBC 4.00 Million/uL      Hemoglobin 12.8 g/dL      Hematocrit 37.7 %      MCV 94 fL      MCH 32.0 pg      MCHC 34.0 g/dL      RDW 12.9 %      MPV 9.0 fL      Platelets 790 Thousands/uL      nRBC 0 /100 WBCs      Neutrophils Relative 61 %      Immat GRANS % 0 %      Lymphocytes Relative 29 %      Monocytes Relative 9 %      Eosinophils Relative 1 %      Basophils Relative 0 %      Neutrophils Absolute 3.13 Thousands/µL      Immature Grans Absolute 0.01 Thousand/uL      Lymphocytes Absolute 1.51 Thousands/µL      Monocytes Absolute 0.48 Thousand/µL      Eosinophils Absolute 0.03 Thousand/µL      Basophils Absolute 0.02 Thousands/µL                  XR chest 2 views   ED Interpretation by Tha Lr DO (08/28 1320)   No acute disease    Image independently interpreted by myself        Final Result by Fifi Cruz MD (08/28 1405)      No acute cardiopulmonary disease. Workstation performed: [id number]                    Procedures  Procedures   ECG 12 Lead Documentation  Date/Time: today/date: 8/28/2023  Performed by: Michelle Argueta    ECG reviewed by me, the ED Provider: yes    Patient location:  ED   Previous ECG:  Compared to current, no change   Rate:  99  ECG rate assessment: normal    Rhythm: sinus rhythm    Ectopy:  none    QRS axis:  Normal  Intervals: normal   Q waves: None   ST segments:  Normal  T waves: normal      Impression: Normal EKG      ED Course  ED Course as of 08/28/23 2200   Mon Aug 28, 2023   1318 D-Dimer, Quant: 0.46  Pulmonary embolism can be ruled out   1318 hs TnI 0hr: <2  Given acute worsening of pain this morning, will obtain delta   1455 Repeat EKG, interpreted by myself -  NSR at 91, otherwise unchanged from previous   1521 hs TnI 2hr: <2  EKG remains unchanged    Symptoms atypical for ACS, labs reassuring. 1526 Pt updated on repeat labs. He reports persistent pain regardless of SL nitro. Will trial toradol for symptomatic relief. Lengthy discussion with pt and wife in regards to need for close OP follow up given symptoms and cardiac history. Will place order for OP stress test and ambulatory referral to cardiology, as pt was last seen in 2021.   1609 Pt reports improvement in pain following toradol. Will proceed with discharge home, recommending close follow up with cardiology. Pt requesting refill on his SL nitro.              HEART Risk Score    Flowsheet Row Most Recent Value   Heart Score Risk Calculator    History 1 Filed at: 08/28/2023 1343   ECG 0 Filed at: 08/28/2023 1343   Age 1 Filed at: 08/28/2023 1343   Risk Factors 2 Filed at: 08/28/2023 1343   Troponin 0 Filed at: 08/28/2023 1343   HEART Score 4 Filed at: 08/28/2023 1343                                      Medical Decision Making  Amount and/or Complexity of Data Reviewed  Labs: ordered. Decision-making details documented in ED Course. Radiology: ordered and independent interpretation performed. Risk  Prescription drug management. Disposition  Final diagnoses:   Atypical chest pain     Time reflects when diagnosis was documented in both MDM as applicable and the Disposition within this note     Time User Action Codes Description Comment    8/28/2023  4:10 PM Mio Danger Add [R07.89] Atypical chest pain       ED Disposition     ED Disposition   Discharge    Condition   Stable    Date/Time   Mon Aug 28, 2023  4:10 PM    [address] discharge to home/self care.                Follow-up Information     Follow up With Specialties Details Why Contact Info Additional [address] [facility] Schedule an appointment as soon as possible for a visit  For re-evaluation [address] [facility] [id number]  [facility], [address] [facility], [place], [place], [address] [phone]          Discharge Medication List as of 8/28/2023  4:12 PM      START taking these medications    Details   nitroglycerin (NITROSTAT) 0.4 mg SL tablet Place 1 tablet (0.4 mg total) under the tongue every 5 (five) minutes as needed for chest pain, Starting Mon 8/28/2023, Normal         CONTINUE these medications which have NOT CHANGED    Details   amLODIPine (NORVASC) 10 mg tablet Take 1 tablet (10 mg total) by mouth daily, Starting Mon 4/3/2023, Normal      aspirin 81 MG tablet Take 81 mg by mouth daily, Historical Med      carvedilol (COREG) 25 mg tablet Take 1 tablet (25 mg total) by mouth 2 (two) times a day with meals, Starting Mon 4/3/2023, Normal      Diclofenac Sodium (VOLTAREN) 1 % Apply 2 g topically 4 (four) times a day, Starting Thu 5/13/2021, Normal      omeprazole (PriLOSEC) 40 MG capsule Take 1 capsule (40 mg total) by mouth daily before breakfast, Starting Mon 3/13/2023, Normal      rosuvastatin (CRESTOR) 40 MG tablet Take 1 tablet (40 mg total) by mouth daily, Starting Wed 7/26/2023, Normal             Outpatient Discharge Orders   Ambulatory Referral to Cardiology   Standing Status: Future Standing Exp. Date: 08/28/24      Stress test only, exercise   Standing Status: Future Standing Exp.  Date: 08/28/27       PDMP Review     None          ED Provider  Electronically Signed by           Josr Lupton DO Ismael  08/28/23 6911

## 2023-08-29 ENCOUNTER — HOSPITAL ENCOUNTER (OUTPATIENT)
Dept: NON INVASIVE DIAGNOSTICS | Facility: CLINIC | Age: 58
Discharge: HOME | End: 2023-08-29
Payer: COMMERCIAL

## 2023-08-29 VITALS
RESPIRATION RATE: 16 BRPM | WEIGHT: 215 LBS | HEART RATE: 88 BPM | OXYGEN SATURATION: 98 % | SYSTOLIC BLOOD PRESSURE: 150 MMHG | DIASTOLIC BLOOD PRESSURE: 98 MMHG | BODY MASS INDEX: 30.1 KG/M2 | HEIGHT: 71 IN

## 2023-08-29 DIAGNOSIS — R07.89 ATYPICAL CHEST PAIN: ICD-10-CM

## 2023-08-29 LAB
MAX HR PERCENT: 92 %
MAX HR: 151 BPM
RATE PRESSURE PRODUCT: NORMAL
SL CV STRESS RECOVERY BP: NORMAL MMHG
SL CV STRESS RECOVERY HR: 100 BPM
SL CV STRESS RECOVERY O2 SAT: 98 %
STRESS ANGINA INDEX: 0
STRESS BASELINE BP: NORMAL MMHG
STRESS BASELINE HR: 88 BPM
STRESS O2 SAT REST: 98 %
STRESS PEAK HR: 151 BPM
STRESS POST ESTIMATED WORKLOAD: 10.1 METS
STRESS POST EXERCISE DUR MIN: 7 MIN
STRESS POST EXERCISE DUR SEC: 30 SEC
STRESS POST O2 SAT PEAK: 97 %
STRESS POST PEAK BP: 188 MMHG

## 2023-08-29 PROCEDURE — 93017 CV STRESS TEST TRACING ONLY: CPT

## 2023-08-29 PROCEDURE — 93018 CV STRESS TEST I&R ONLY: CPT | Performed by: INTERNAL MEDICINE

## 2023-08-29 PROCEDURE — 93016 CV STRESS TEST SUPVJ ONLY: CPT | Performed by: INTERNAL MEDICINE

## 2023-08-30 ENCOUNTER — TELEPHONE (OUTPATIENT)
Dept: FAMILY MEDICINE CLINIC | Facility: CLINIC | Age: 58
End: 2023-08-30

## 2023-08-30 ENCOUNTER — TELEPHONE (OUTPATIENT)
Dept: CARDIOLOGY CLINIC | Facility: CLINIC | Age: 58
End: 2023-08-30

## 2023-08-30 LAB
CHEST PAIN STATEMENT: NORMAL
MAX DIASTOLIC BP: 86 MMHG
MAX HEART RATE: 151 BPM
MAX PREDICTED HEART RATE: 163 BPM
MAX. SYSTOLIC BP: 188 MMHG
PROTOCOL NAME: NORMAL
REASON FOR TERMINATION: NORMAL
TARGET HR FORMULA: NORMAL
TEST INDICATION: NORMAL
TIME IN EXERCISE PHASE: NORMAL

## 2023-08-30 NOTE — TELEPHONE ENCOUNTER
Patient's wife called because her  had a stress test done and they wanted the doctor to add result notes. I contacted her back and informed her that she must asking the ordering provider for this.

## 2023-08-30 NOTE — TELEPHONE ENCOUNTER
PC from patient's wife. Claudia Clemens  had a stress test yesterday, ordered by ER physician. Results are in my chart and they do not know how to read them. He has been having chest pain that is similar to the chest pain he had when he had stents put in 10 years ago. He was given Nitro in ER. ER physician told them to call cardiologist for results. They have an appointment with Dr Faizan Rodriguez but not until Oct and wanted to know if stress test was okay or not due to his chest pain. He is a former Dr Amilcar Lancaster patient. Please glance at stress test, patient wants piece of mind until October appointment.

## 2023-08-31 ENCOUNTER — TELEPHONE (OUTPATIENT)
Dept: FAMILY MEDICINE CLINIC | Facility: CLINIC | Age: 58
End: 2023-08-31

## 2023-08-31 NOTE — TELEPHONE ENCOUNTER
Patients wife is concerned for her  because he is having chest pain. He recently has has a stress test done and they have not had the results read to them. She went to the ordering provider [address] spoke with one of her assistants where she was informed that her  would have to call the cardiologist for the results. October 9th is when the appt with cariology is scheduled and when they called cardiology, they were told they will not be able to speak with the doctor until the time of his visit. Since this pt is dealing with pain and discomfort, is there anything you can do to advise him on this matter? And possibly read his results? Pt does not want to have these concerns throughout long weekend.

## 2023-08-31 NOTE — TELEPHONE ENCOUNTER
Patient's wife Magali Blevins called again very concerned asking for a return call today please stating patient is symptomatic with the same symptoms he experienced 10 years ago when he needed a stent. Also, asking to discuss patient's stress test results.

## 2023-09-01 NOTE — TELEPHONE ENCOUNTER
Patient has been notified and informed of suggestion from dr Vangie Castle and he has also been set up to come in the office next week

## 2023-09-06 ENCOUNTER — APPOINTMENT (OUTPATIENT)
Dept: LAB | Facility: CLINIC | Age: 58
End: 2023-09-06
Payer: COMMERCIAL

## 2023-09-06 ENCOUNTER — OFFICE VISIT (OUTPATIENT)
Dept: FAMILY MEDICINE CLINIC | Facility: CLINIC | Age: 58
End: 2023-09-06
Payer: COMMERCIAL

## 2023-09-06 VITALS
BODY MASS INDEX: 29.34 KG/M2 | WEIGHT: 209.6 LBS | DIASTOLIC BLOOD PRESSURE: 80 MMHG | HEART RATE: 97 BPM | OXYGEN SATURATION: 98 % | SYSTOLIC BLOOD PRESSURE: 140 MMHG | RESPIRATION RATE: 12 BRPM | HEIGHT: 71 IN

## 2023-09-06 DIAGNOSIS — I10 ESSENTIAL HYPERTENSION: ICD-10-CM

## 2023-09-06 DIAGNOSIS — E78.5 HYPERLIPIDEMIA, UNSPECIFIED HYPERLIPIDEMIA TYPE: ICD-10-CM

## 2023-09-06 DIAGNOSIS — I25.10 CORONARY ARTERY DISEASE INVOLVING NATIVE HEART WITHOUT ANGINA PECTORIS, UNSPECIFIED VESSEL OR LESION TYPE: ICD-10-CM

## 2023-09-06 DIAGNOSIS — I25.10 ATHEROSCLEROSIS OF NATIVE CORONARY ARTERY OF NATIVE HEART WITHOUT ANGINA PECTORIS: ICD-10-CM

## 2023-09-06 DIAGNOSIS — Z23 ENCOUNTER FOR IMMUNIZATION: ICD-10-CM

## 2023-09-06 DIAGNOSIS — R07.9 CHEST PAIN, UNSPECIFIED TYPE: Primary | ICD-10-CM

## 2023-09-06 LAB
CHOLEST SERPL-MCNC: 210 MG/DL
HDLC SERPL-MCNC: 68 MG/DL
LDLC SERPL CALC-MCNC: 84 MG/DL (ref 0–100)
NONHDLC SERPL-MCNC: 142 MG/DL
TRIGL SERPL-MCNC: 289 MG/DL

## 2023-09-06 PROCEDURE — 93000 ELECTROCARDIOGRAM COMPLETE: CPT | Performed by: INTERNAL MEDICINE

## 2023-09-06 PROCEDURE — 99214 OFFICE O/P EST MOD 30 MIN: CPT | Performed by: INTERNAL MEDICINE

## 2023-09-06 PROCEDURE — 80061 LIPID PANEL: CPT

## 2023-09-06 PROCEDURE — 36415 COLL VENOUS BLD VENIPUNCTURE: CPT

## 2023-09-06 RX ORDER — CARVEDILOL 25 MG/1
25 TABLET ORAL
Qty: 180 TABLET | Refills: 2
Start: 2023-09-06

## 2023-09-06 RX ORDER — CARVEDILOL 12.5 MG/1
12.5 TABLET ORAL
Qty: 180 TABLET | Refills: 0 | Status: SHIPPED | OUTPATIENT
Start: 2023-09-06

## 2023-09-06 NOTE — PATIENT INSTRUCTIONS
Please take carvedilol 12.5 mg with amlodipine 10 mg daily in the morning. Please take carvedilol 25 mg daily with dinner.

## 2023-09-06 NOTE — ASSESSMENT & PLAN NOTE
Blood pressure is slightly on the higher side, he said that at home overall it is much better. He could not tolerate full dose of carvedilol 25 mg twice daily, continue with amlodipine 10 mg, take carvedilol 12.5 mg in the morning with amlodipine and 25 mg daily at night. Follow-up in few weeks.

## 2023-09-06 NOTE — PROGRESS NOTES
Assessment/Plan:    Essential hypertension  Blood pressure is slightly on the higher side, he said that at home overall it is much better. He could not tolerate full dose of carvedilol 25 mg twice daily, continue with amlodipine 10 mg, take carvedilol 12.5 mg in the morning with amlodipine and 25 mg daily at night. Follow-up in few weeks. Hyperlipidemia  Recheck lipid panel, he is currently on Crestor 40 mg daily. Chest pain  Reports exertional chest pain, recent treadmill stress test did not show significant ischemia. Due to his history of CAD with stent placement 10 years ago he is a definitely high risk patient. We will proceed with nuclear stress test.  EKG today in the office did not reveal any changes. Diagnoses and all orders for this visit:    Chest pain, unspecified type  -     NM myocardial perfusion spect (rx stress and/or rest); Future  -     POCT ECG    Encounter for immunization    Hyperlipidemia, unspecified hyperlipidemia type  -     Lipid panel; Future    Essential hypertension  -     carvedilol (COREG) 25 mg tablet; Take 1 tablet (25 mg total) by mouth daily at bedtime  -     carvedilol (COREG) 12.5 mg tablet; Take 1 tablet (12.5 mg total) by mouth daily in the early morning    Coronary artery disease involving native heart without angina pectoris, unspecified vessel or lesion type    Atherosclerosis of native coronary artery of native heart without angina pectoris          Subjective:      Patient ID: Jeannette Baltazar is a 62 y.o. male. Patient came today for follow-up on his chronic problems including hypertension, hyperlipidemia and episodes of the chest pains that he has sometimes with exertion. Chest Pain   Pertinent negatives include no cough, fever or shortness of breath.        The following portions of the patient's history were reviewed and updated as appropriate: allergies, current medications, past family history, past medical history, past social history, past surgical history, and problem list.    Review of Systems   Constitutional: Negative for appetite change, chills, fatigue and fever. HENT: Negative for sinus pain. Respiratory: Negative for cough, chest tightness and shortness of breath. Cardiovascular: Positive for chest pain. Musculoskeletal: Negative for arthralgias and myalgias. Objective:      /80 (BP Location: Left arm, Patient Position: Sitting, Cuff Size: Standard)   Pulse 97   Resp 12   Ht 5' 11" (1.803 m)   Wt 95.1 kg (209 lb 9.6 oz)   SpO2 98%   BMI 29.23 kg/m²     No Known Allergies       Current Outpatient Medications:   •  carvedilol (COREG) 12.5 mg tablet, Take 1 tablet (12.5 mg total) by mouth daily in the early morning, Disp: 180 tablet, Rfl: 0  •  carvedilol (COREG) 25 mg tablet, Take 1 tablet (25 mg total) by mouth daily at bedtime, Disp: 180 tablet, Rfl: 2  •  amLODIPine (NORVASC) 10 mg tablet, Take 1 tablet (10 mg total) by mouth daily, Disp: 90 tablet, Rfl: 1  •  aspirin 81 MG tablet, Take 81 mg by mouth daily, Disp: , Rfl:   •  Diclofenac Sodium (VOLTAREN) 1 %, Apply 2 g topically 4 (four) times a day, Disp: 100 g, Rfl: 0  •  nitroglycerin (NITROSTAT) 0.4 mg SL tablet, Place 1 tablet (0.4 mg total) under the tongue every 5 (five) minutes as needed for chest pain, Disp: 30 tablet, Rfl: 0  •  omeprazole (PriLOSEC) 40 MG capsule, Take 1 capsule (40 mg total) by mouth daily before breakfast, Disp: 90 capsule, Rfl: 1  •  rosuvastatin (CRESTOR) 40 MG tablet, Take 1 tablet (40 mg total) by mouth daily, Disp: 90 tablet, Rfl: 3     Patient Instructions   Please take carvedilol 12.5 mg with amlodipine 10 mg daily in the morning. Please take carvedilol 25 mg daily with dinner. Physical Exam  Vitals reviewed. Constitutional:       General: He is not in acute distress. Appearance: He is not toxic-appearing. HENT:      Head: Normocephalic. Cardiovascular:      Rate and Rhythm: Normal rate.       Pulses: Normal pulses. Heart sounds: No murmur heard. No gallop. Pulmonary:      Effort: Pulmonary effort is normal. No tachypnea or respiratory distress. Breath sounds: No wheezing or rales. Skin:     General: Skin is warm. Neurological:      General: No focal deficit present. Mental Status: He is alert.    Psychiatric:         Mood and Affect: Mood normal.         Behavior: Behavior normal.

## 2023-09-06 NOTE — ASSESSMENT & PLAN NOTE
Reports exertional chest pain, recent treadmill stress test did not show significant ischemia. Due to his history of CAD with stent placement 10 years ago he is a definitely high risk patient. We will proceed with nuclear stress test.  EKG today in the office did not reveal any changes.

## 2023-09-07 NOTE — TELEPHONE ENCOUNTER
Called wife and they saw Dr Allan Sherman yesterday and agree with both physicians, PCP and Dr Zheng Shannon. I gave her Dr Hyacinth García notes and advisement.

## 2023-09-18 ENCOUNTER — HOSPITAL ENCOUNTER (OUTPATIENT)
Dept: NON INVASIVE DIAGNOSTICS | Facility: HOSPITAL | Age: 58
Discharge: HOME | End: 2023-09-18
Attending: INTERNAL MEDICINE
Payer: COMMERCIAL

## 2023-09-18 ENCOUNTER — HOSPITAL ENCOUNTER (OUTPATIENT)
Dept: NUCLEAR MEDICINE | Facility: HOSPITAL | Age: 58
Discharge: HOME | End: 2023-09-18
Attending: INTERNAL MEDICINE
Payer: COMMERCIAL

## 2023-09-18 VITALS — HEIGHT: 71 IN | WEIGHT: 209 LBS | BODY MASS INDEX: 29.26 KG/M2

## 2023-09-18 DIAGNOSIS — R07.9 CHEST PAIN, UNSPECIFIED TYPE: ICD-10-CM

## 2023-09-18 LAB
CHEST PAIN STATEMENT: NORMAL
MAX DIASTOLIC BP: 98 MMHG
MAX HEART RATE: 164 BPM
MAX HR PERCENT: 100 %
MAX HR: 164 BPM
MAX PREDICTED HEART RATE: 163 BPM
MAX. SYSTOLIC BP: 176 MMHG
NUC STRESS EJECTION FRACTION: 76 %
PROTOCOL NAME: NORMAL
RATE PRESSURE PRODUCT: NORMAL
SL CV REST NUCLEAR ISOTOPE DOSE: 11 MCI
SL CV STRESS NUCLEAR ISOTOPE DOSE: 30.2 MCI
SL CV STRESS RECOVERY BP: NORMAL MMHG
SL CV STRESS RECOVERY HR: 121 BPM
SL CV STRESS RECOVERY O2 SAT: 98 %
SL CV STRESS STAGE REACHED: 3
STRESS ANGINA INDEX: 0
STRESS BASELINE BP: NORMAL MMHG
STRESS BASELINE HR: 115 BPM
STRESS O2 SAT REST: 97 %
STRESS PEAK HR: 164 BPM
STRESS POST ESTIMATED WORKLOAD: 7.7 METS
STRESS POST EXERCISE DUR MIN: 6 MIN
STRESS POST EXERCISE DUR SEC: 30 SEC
STRESS POST O2 SAT PEAK: 97 %
STRESS POST PEAK BP: 176 MMHG
STRESS/REST PERFUSION RATIO: 1.05
TARGET HR FORMULA: NORMAL
TEST INDICATION: NORMAL
TIME IN EXERCISE PHASE: NORMAL

## 2023-09-18 PROCEDURE — G1004 CDSM NDSC: HCPCS

## 2023-09-18 PROCEDURE — A9502 TC99M TETROFOSMIN: HCPCS

## 2023-09-18 PROCEDURE — 93016 CV STRESS TEST SUPVJ ONLY: CPT | Performed by: INTERNAL MEDICINE

## 2023-09-18 PROCEDURE — 78452 HT MUSCLE IMAGE SPECT MULT: CPT | Performed by: INTERNAL MEDICINE

## 2023-09-18 PROCEDURE — 93017 CV STRESS TEST TRACING ONLY: CPT

## 2023-09-18 PROCEDURE — 78452 HT MUSCLE IMAGE SPECT MULT: CPT

## 2023-09-18 PROCEDURE — 93018 CV STRESS TEST I&R ONLY: CPT | Performed by: INTERNAL MEDICINE

## 2023-09-25 ENCOUNTER — APPOINTMENT (EMERGENCY)
Dept: RADIOLOGY | Facility: HOSPITAL | Age: 58
End: 2023-09-25
Payer: COMMERCIAL

## 2023-09-25 ENCOUNTER — HOSPITAL ENCOUNTER (EMERGENCY)
Facility: HOSPITAL | Age: 58
Discharge: HOME | End: 2023-09-25
Attending: EMERGENCY MEDICINE | Admitting: EMERGENCY MEDICINE
Payer: COMMERCIAL

## 2023-09-25 VITALS
TEMPERATURE: 98.2 F | WEIGHT: 220.68 LBS | OXYGEN SATURATION: 96 % | BODY MASS INDEX: 30.78 KG/M2 | SYSTOLIC BLOOD PRESSURE: 168 MMHG | DIASTOLIC BLOOD PRESSURE: 98 MMHG | RESPIRATION RATE: 18 BRPM | HEART RATE: 93 BPM

## 2023-09-25 DIAGNOSIS — S80.219A KNEE ABRASION: ICD-10-CM

## 2023-09-25 DIAGNOSIS — M25.561 RIGHT KNEE PAIN: Primary | ICD-10-CM

## 2023-09-25 PROCEDURE — 99284 EMERGENCY DEPT VISIT MOD MDM: CPT | Performed by: PHYSICIAN ASSISTANT

## 2023-09-25 PROCEDURE — 73564 X-RAY EXAM KNEE 4 OR MORE: CPT

## 2023-09-25 PROCEDURE — 99283 EMERGENCY DEPT VISIT LOW MDM: CPT

## 2023-09-25 RX ORDER — CEPHALEXIN 500 MG/1
500 CAPSULE ORAL EVERY 8 HOURS SCHEDULED
Qty: 21 CAPSULE | Refills: 0 | Status: SHIPPED | OUTPATIENT
Start: 2023-09-25 | End: 2023-10-02

## 2023-09-25 NOTE — ED PROVIDER NOTES
History  Chief Complaint   Patient presents with   • Knee Injury     Patient tripped and fell onto right knee at work last Wednesday. Patient reports pain and swelling to knee. Patient is a 80-year-old male with a past medical history of hypertension, GERD, hyperlipidemia who presents for evaluation of right knee pain. Patient states that 6 days ago he was at work when he tripped over some fencing and landed on the right knee. Patient states that he was wearing jeans at the time and there was no tear to the jeans however when he looked at his knee there was some bleeding abrasions. He states he had pain and swelling to the area initially. He states that the swelling has improved however he still having pain. He notes that there is some redness and warmth around the prior abrasions. This area has not been worsening. Range of motion is intact with some discomfort. He is able to weight-bear. He has no history of fracture or surgeries to the knee. He denies fever, chills, chest pain, shortness of breath, abdominal pain, nausea vomiting diarrhea, numbness or weakness. Prior to Admission Medications   Prescriptions Last Dose Informant Patient Reported? Taking?    Diclofenac Sodium (VOLTAREN) 1 %  Self No No   Sig: Apply 2 g topically 4 (four) times a day   amLODIPine (NORVASC) 10 mg tablet  Self No No   Sig: Take 1 tablet (10 mg total) by mouth daily   aspirin 81 MG tablet  Self Yes No   Sig: Take 81 mg by mouth daily   carvedilol (COREG) 12.5 mg tablet   No No   Sig: Take 1 tablet (12.5 mg total) by mouth daily in the early morning   carvedilol (COREG) 25 mg tablet   No No   Sig: Take 1 tablet (25 mg total) by mouth daily at bedtime   nitroglycerin (NITROSTAT) 0.4 mg SL tablet   No No   Sig: Place 1 tablet (0.4 mg total) under the tongue every 5 (five) minutes as needed for chest pain   omeprazole (PriLOSEC) 40 MG capsule  Self No No   Sig: Take 1 capsule (40 mg total) by mouth daily before breakfast   rosuvastatin (CRESTOR) 40 MG tablet  Self No No   Sig: Take 1 tablet (40 mg total) by mouth daily      Facility-Administered Medications: None       Past Medical History:   Diagnosis Date   • High blood pressure    • Hypercholesteremia    • Hyperlipidemia    • Hypertension    • Prostatitis        Past Surgical History:   Procedure Laterality Date   • APPENDECTOMY     • CORONARY ANGIOPLASTY WITH STENT PLACEMENT  04/2013    LAD   • NASAL SEPTUM SURGERY     • MD COLONOSCOPY FLX DX W/COLLJ SPEC WHEN PFRMD N/A 12/9/2016    Procedure: EGD AND COLONOSCOPY;  Surgeon: Hank Hager MD;  Location: [facility]; Service: Gastroenterology   • SEPTOPLASTY         Family History   Problem Relation Age of Onset   • Diverticulitis Mother    • Colon cancer Mother         smoker   • Depression Mother    • Schizophrenia Mother         Paranoid   • Hyperlipidemia Mother    • Lung cancer Father         Smoker   • Cancer Father         bladder   • Neuropathy Father    • Diabetes Father         no injections   • Hyperlipidemia Father    • No Known Problems Sister    • Hyperlipidemia Sister    • Brain cancer Brother    • Diabetes Paternal Uncle    • Lung cancer Paternal Uncle      I have reviewed and agree with the history as documented. E-Cigarette/Vaping   • E-Cigarette Use Never User      E-Cigarette/Vaping Substances   • Nicotine No    • THC No    • CBD No    • Flavoring No    • Other No    • Unknown No      Social History     Tobacco Use   • Smoking status: Never   • Smokeless tobacco: Never   • Tobacco comments:     second hand smoke exposure (parents)   Vaping Use   • Vaping Use: Never used   Substance Use Topics   • Alcohol use: Yes     Comment: "probably every day"  7 beers/day or equivalent   • Drug use: No       Review of Systems   Constitutional: Negative for chills and fever. Eyes: Negative for visual disturbance. Respiratory: Negative for shortness of breath. Cardiovascular: Negative for chest pain. Gastrointestinal: Negative for abdominal pain, diarrhea, nausea and vomiting. Genitourinary: Negative for difficulty urinating. Musculoskeletal: Positive for arthralgias and joint swelling. Negative for back pain. Skin: Positive for color change and wound. Negative for rash. Neurological: Negative for weakness and numbness. All other systems reviewed and are negative. Physical Exam  Physical Exam  Vitals and nursing note reviewed. Constitutional:       General: He is not in acute distress. Appearance: He is well-developed. HENT:      Head: Normocephalic and atraumatic. Eyes:      Conjunctiva/sclera: Conjunctivae normal.   Cardiovascular:      Rate and Rhythm: Normal rate. Pulmonary:      Effort: Pulmonary effort is normal. No respiratory distress. Breath sounds: Normal breath sounds. Abdominal:      General: Abdomen is flat. There is no distension. Musculoskeletal:         General: Swelling and tenderness present. Normal range of motion. Cervical back: Neck supple. Right knee: Swelling, erythema and crepitus present. No deformity, effusion, ecchymosis or lacerations. Normal range of motion. Tenderness present. Normal pulse. Comments: Right knee with TTP anteriorly. Mild swelling without effusion. There are two healing superficial abrasions with some erythema. No bleeding or drainage. ROM of knee intact without significant pain. No obvious deformity or ligamentous instability. NVI distally. Pedal pulses intact. Pictures in media section of chart. Skin:     General: Skin is warm. Capillary Refill: Capillary refill takes less than 2 seconds. Neurological:      Mental Status: He is alert.    Psychiatric:         Mood and Affect: Mood normal.         Vital Signs  ED Triage Vitals   Temperature Pulse Respirations Blood Pressure SpO2   09/25/23 1405 09/25/23 1407 09/25/23 1407 09/25/23 1407 09/25/23 1407   98.2 °F (36.8 °C) 93 18 168/98 96 %      Temp Source Heart Rate Source Patient Position - Orthostatic VS BP Location FiO2 (%)   09/25/23 1405 09/25/23 1407 09/25/23 1407 09/25/23 1407 --   Oral Monitor Sitting Right arm       Pain Score       09/25/23 1407       6           Vitals:    09/25/23 1407   BP: 168/98   Pulse: 93   Patient Position - Orthostatic VS: Sitting         Visual Acuity      ED Medications  Medications - No data to display    Diagnostic Studies  Results Reviewed     None                 XR knee 4+ vw right injury   Final Result by Lorri Ramirez MD (09/25 1545)      No acute osseous abnormality. Workstation performed: [id number]                    Procedures  Procedures         ED Course                                             Medical Decision Making  DDX including but not limited to: arthritis, bursitis, tendinitis, sprain, strain, fracture, meniscal tear, cellulitis, osteomyelitis, gout, Lyme disease, Baker's cyst, rheumatologic process; doubt septic arthritis or DVT or arterial occlusion. Plan- will xray to r/o fracture/bony abnormality. Xray with no acute osseous abnormality. Discussed results with patient. Will cover with keflex for possible infected abrasion however doubt septic arthritis or abscess. Instructed to follow up with his work/worker's comp/occupational medicine and would recommend follow up with orthopedics. Given contact information for him to call to set up appointments.  Continue with supportive care for knee pain including rest, ice, elevation, tylenol or motrin for pain  The management plan was discussed in detail with the patient at bedside and all questions were answered. Wendelyn Smoke to discharge, we provided both verbal and written instructions.  We discussed with the patient the signs and symptoms for which to return to the emergency department.  All questions were answered and patient was comfortable with the plan of care and discharged to home.  Instructed the patient to follow up with the primary care provider and/or specialist provided and their written instructions.  The patient verbalized understanding of our discussion and plan of care, and agrees to return to the Emergency Department for concerns and progression of illness.     At discharge, I instructed the patient to:  -follow up with pcp/occ med   -follow up with the recommended specialists-ortho   -return to the ER if symptoms worsened or new symptoms arose  Patient agreed to this plan and was stable at time of discharge. Knee abrasion: acute illness or injury  Right knee pain: acute illness or injury  Amount and/or Complexity of Data Reviewed  Radiology: ordered and independent interpretation performed. Decision-making details documented in ED Course. Discussion of management or test interpretation with external provider(s): Discussed with Dr. Larissa Velazquez, ED attending    Risk  Prescription drug management. Disposition  Final diagnoses:   Right knee pain   Knee abrasion     Time reflects when diagnosis was documented in both MDM as applicable and the Disposition within this note     Time User Action Codes Description Comment    9/25/2023  3:55 PM Charles Harodiane Emmanuel Right knee pain     9/25/2023  4:03 PM Charles Patel [S80.219A] Knee abrasion       ED Disposition     ED Disposition   Discharge    Condition   Stable    Date/Time   Mon Sep 25, 2023  3:55 PM    [address] discharge to home/self care.                Follow-up Information     Follow up With Specialties Details Why Contact Info Additional Information    Yomi Roman MD Internal Medicine Schedule an appointment as soon as possible for a visit in 1 day  [facility] [address]  [address] Emergency Department Emergency Medicine  If symptoms worsen [address] [address] [address] [facility] Emergency Department, [address] [place], [place], [address] [facility] [address] [address] [address] [facility] Schedule an appointment as soon as possible for a visit   [address]  [place] [address] [facility] [address]  [address] [facility], [address], [address] [place], [place], [address]   [address] [place] [address]  Schedule an appointment as soon as possible for a visit in 1 day If your work does not have an occupational medicine group [facility] [address] [facility] [address] [phone]     Follow up with your work/worker's comp/occupational medicine group in 1 day               Discharge Medication List as of 9/25/2023  4:04 PM      START taking these medications    Details   cephalexin (KEFLEX) 500 mg capsule Take 1 capsule (500 mg total) by mouth every 8 (eight) hours for 7 days, Starting Mon 9/25/2023, Until Mon 10/2/2023, Normal         CONTINUE these medications which have NOT CHANGED    Details   amLODIPine (NORVASC) 10 mg tablet Take 1 tablet (10 mg total) by mouth daily, Starting Mon 4/3/2023, Normal      aspirin 81 MG tablet Take 81 mg by mouth daily, Historical Med      !! carvedilol (COREG) 12.5 mg tablet Take 1 tablet (12.5 mg total) by mouth daily in the early morning, Starting Wed 9/6/2023, Normal      !! carvedilol (COREG) 25 mg tablet Take 1 tablet (25 mg total) by mouth daily at bedtime, Starting Wed 9/6/2023, No Print      Diclofenac Sodium (VOLTAREN) 1 % Apply 2 g topically 4 (four) times a day, Starting Thu 5/13/2021, Normal      nitroglycerin (NITROSTAT) 0.4 mg SL tablet Place 1 tablet (0.4 mg total) under the tongue every 5 (five) minutes as needed for chest pain, Starting Mon 8/28/2023, Normal      omeprazole (PriLOSEC) 40 MG capsule Take 1 capsule (40 mg total) by mouth daily before breakfast, Starting Mon 3/13/2023, Normal      rosuvastatin (CRESTOR) 40 MG tablet Take 1 tablet (40 mg total) by mouth daily, Starting Wed 7/26/2023, Normal       !! - Potential duplicate medications found. Please discuss with provider. No discharge procedures on file.     PDMP Review     None          ED Provider  Electronically Signed by           Waylon Bazzi PA-C  09/25/23 2008

## 2023-09-25 NOTE — Clinical Note
Octavia Barfield was seen and treated in our emergency department on 9/25/2023. Diagnosis:     Betzy Lehman  may return to work on return date. He may return on this date: 09/27/2023    Follow up with your work/worker's comp/occupational medicine group for further restrictions/return to work      If you have any questions or concerns, please don't hesitate to call.       Araseli Shaikh PA-C    ______________________________           _______________          _______________  Hospital Representative                              Date                                Time

## 2023-10-06 ENCOUNTER — APPOINTMENT (OUTPATIENT)
Dept: URGENT CARE | Facility: CLINIC | Age: 58
End: 2023-10-06
Payer: OTHER MISCELLANEOUS

## 2023-10-06 ENCOUNTER — OFFICE VISIT (OUTPATIENT)
Dept: FAMILY MEDICINE CLINIC | Facility: CLINIC | Age: 58
End: 2023-10-06
Payer: COMMERCIAL

## 2023-10-06 VITALS
WEIGHT: 215.2 LBS | OXYGEN SATURATION: 95 % | SYSTOLIC BLOOD PRESSURE: 134 MMHG | HEART RATE: 82 BPM | BODY MASS INDEX: 30.01 KG/M2 | DIASTOLIC BLOOD PRESSURE: 80 MMHG

## 2023-10-06 DIAGNOSIS — I10 ESSENTIAL HYPERTENSION: ICD-10-CM

## 2023-10-06 DIAGNOSIS — Z00.00 ANNUAL PHYSICAL EXAM: Primary | ICD-10-CM

## 2023-10-06 DIAGNOSIS — Z23 NEED FOR PROPHYLACTIC VACCINATION WITH COMBINED DIPHTHERIA-TETANUS-PERTUSSIS (DTP) VACCINE: ICD-10-CM

## 2023-10-06 DIAGNOSIS — E78.2 MIXED HYPERLIPIDEMIA: ICD-10-CM

## 2023-10-06 PROCEDURE — 99396 PREV VISIT EST AGE 40-64: CPT | Performed by: INTERNAL MEDICINE

## 2023-10-06 PROCEDURE — 90471 IMMUNIZATION ADMIN: CPT

## 2023-10-06 PROCEDURE — 99213 OFFICE O/P EST LOW 20 MIN: CPT

## 2023-10-06 PROCEDURE — 90715 TDAP VACCINE 7 YRS/> IM: CPT

## 2023-10-06 NOTE — ASSESSMENT & PLAN NOTE
Blood pressure is very well controlled right now, continue current dose of carvedilol and amlodipine.

## 2023-10-06 NOTE — ASSESSMENT & PLAN NOTE
He showed significant improvement on current dose of Crestor. Continue the same. We will recheck lipid panel with the next blood work.

## 2023-10-06 NOTE — PROGRESS NOTES
Assessment/Plan:    Essential hypertension  Blood pressure is very well controlled right now, continue current dose of carvedilol and amlodipine. Mixed hyperlipidemia  He showed significant improvement on current dose of Crestor. Continue the same. We will recheck lipid panel with the next blood work. Diagnoses and all orders for this visit:    Annual physical exam    Need for prophylactic vaccination with combined diphtheria-tetanus-pertussis (DTP) vaccine  -     TDAP VACCINE GREATER THAN OR EQUAL TO 6YO IM    Essential hypertension    Mixed hyperlipidemia          Subjective:      Patient ID: Yennifer Carreon is a 62 y.o. male. Patient came today for annual checkup. Vital signs are very acceptable. He agreed for tetanus shot, he would like to hold off on his Shingrix as for now. He does not smoke, drinks alcohol very occasionally. Up-to-date on colonoscopy and PSA. The following portions of the patient's history were reviewed and updated as appropriate: allergies, current medications, past family history, past medical history, past social history, past surgical history, and problem list.    Review of Systems   Constitutional: Negative for chills, fatigue and fever. Respiratory: Negative for cough, chest tightness and shortness of breath. Cardiovascular: Negative for chest pain, palpitations and leg swelling. Gastrointestinal: Negative for abdominal distention, abdominal pain, blood in stool, constipation, diarrhea and nausea. Genitourinary: Negative for difficulty urinating and dysuria. Musculoskeletal: Negative for arthralgias, back pain, gait problem, joint swelling, myalgias and neck pain. Skin: Negative for rash. Neurological: Negative for dizziness, weakness, numbness and headaches. Psychiatric/Behavioral: Negative for agitation.          Objective:      /80 (BP Location: Left arm, Patient Position: Sitting, Cuff Size: Large)   Pulse 82   Wt 97.6 kg (215 lb 3.2 oz)   SpO2 95%   BMI 30.01 kg/m²     No Known Allergies       Current Outpatient Medications:   •  amLODIPine (NORVASC) 10 mg tablet, Take 1 tablet (10 mg total) by mouth daily, Disp: 90 tablet, Rfl: 1  •  aspirin 81 MG tablet, Take 81 mg by mouth daily, Disp: , Rfl:   •  carvedilol (COREG) 12.5 mg tablet, Take 1 tablet (12.5 mg total) by mouth daily in the early morning, Disp: 180 tablet, Rfl: 0  •  carvedilol (COREG) 25 mg tablet, Take 1 tablet (25 mg total) by mouth daily at bedtime, Disp: 180 tablet, Rfl: 2  •  Diclofenac Sodium (VOLTAREN) 1 %, Apply 2 g topically 4 (four) times a day, Disp: 100 g, Rfl: 0  •  omeprazole (PriLOSEC) 40 MG capsule, Take 1 capsule (40 mg total) by mouth daily before breakfast, Disp: 90 capsule, Rfl: 1  •  rosuvastatin (CRESTOR) 40 MG tablet, Take 1 tablet (40 mg total) by mouth daily, Disp: 90 tablet, Rfl: 3  •  nitroglycerin (NITROSTAT) 0.4 mg SL tablet, Place 1 tablet (0.4 mg total) under the tongue every 5 (five) minutes as needed for chest pain (Patient not taking: Reported on 10/6/2023), Disp: 30 tablet, Rfl: 0     There are no Patient Instructions on file for this visit. Physical Exam  Vitals reviewed. Constitutional:       General: He is not in acute distress. Appearance: He is not toxic-appearing. HENT:      Head: Normocephalic. Cardiovascular:      Rate and Rhythm: Normal rate. Pulses: Normal pulses. Heart sounds: No murmur heard. No gallop. Pulmonary:      Effort: Pulmonary effort is normal. No respiratory distress. Breath sounds: No wheezing or rales. Skin:     General: Skin is warm. Neurological:      General: No focal deficit present. Mental Status: He is alert.    Psychiatric:         Mood and Affect: Mood normal.         Behavior: Behavior normal.

## 2023-10-09 ENCOUNTER — OFFICE VISIT (OUTPATIENT)
Dept: CARDIOLOGY CLINIC | Facility: CLINIC | Age: 58
End: 2023-10-09
Payer: COMMERCIAL

## 2023-10-09 VITALS
SYSTOLIC BLOOD PRESSURE: 128 MMHG | DIASTOLIC BLOOD PRESSURE: 86 MMHG | HEART RATE: 70 BPM | BODY MASS INDEX: 29.74 KG/M2 | WEIGHT: 213.2 LBS

## 2023-10-09 DIAGNOSIS — R07.89 ATYPICAL CHEST PAIN: ICD-10-CM

## 2023-10-09 DIAGNOSIS — I25.10 ATHEROSCLEROSIS OF NATIVE CORONARY ARTERY OF NATIVE HEART WITHOUT ANGINA PECTORIS: Primary | ICD-10-CM

## 2023-10-09 DIAGNOSIS — I10 ESSENTIAL HYPERTENSION: ICD-10-CM

## 2023-10-09 DIAGNOSIS — E78.5 DYSLIPIDEMIA: ICD-10-CM

## 2023-10-09 PROCEDURE — 99214 OFFICE O/P EST MOD 30 MIN: CPT | Performed by: INTERNAL MEDICINE

## 2023-10-09 RX ORDER — EZETIMIBE 10 MG/1
10 TABLET ORAL DAILY
Qty: 90 TABLET | Refills: 3 | Status: SHIPPED | OUTPATIENT
Start: 2023-10-09 | End: 2024-01-07

## 2023-10-09 NOTE — PROGRESS NOTES
Cardiology Office Note  MD Kevin Wallace MD, Claudio Vera DO, [facility]  MD Anushka Fountain DO, Mandeep Richards DO, [facility]  ----------------------------------------------------------------  [address] [facility]  [address] [facility], [address] [facility]    Karen Gomez 62 y.o. male MRN: [id number]  Unit/Bed#:  Encounter: [id number]      History of Present Illness: It was a pleasure to see Karen Gomez in the office today for follow up CV evaluation. He has a past medical history of hypertension, dyslipidemia and CAD. He establish care with us in October 2023. Patient was seen at [facility] in April 2013 due to episodes of chest discomfort. At the time, he had exertional chest discomfort when walking uphill with his dog. Symptoms would improve with rest.  Eventually, his symptoms began to occur with rest.  There was no radiation of symptoms, nausea or diaphoresis. He was sent for cardiac catheterization and received drug-eluting stent to the proximal LAD  with residual 50% first diagonal stenosis. Following the hospitalization, he was managed by Dr. Tan Aguero for his cardiovascular care for many years. In September 2023, the patient experienced an episode of chest discomfort. Initially, he thought it might have been similar to his prior angina. There was some concern it may have been exertionally related for which he saw primary care. Primary care ordered stress test in September 2023 to rule out any evidence of underlying myocardial ischemia. After the stress test was ordered, he noticed that the chest discomfort did not seem to come with further exertion and during the stress test, he did not have any exertional discomfort. Stress test was found to be negative for myocardial ischemia. He is here today for follow-up. Denies any further chest pain, pressure, tightness or squeezing. Denies lightheadedness, dizziness or palpitations. Denies lower extremity swelling, orthopnea or paroxysmal nocturnal dyspnea. Review of Systems:  Review of Systems   Constitutional: Negative for decreased appetite, fever, weight gain and weight loss. HENT: Negative for congestion and sore throat. Eyes: Negative for visual disturbance. Cardiovascular: Negative for chest pain, dyspnea on exertion, leg swelling, near-syncope and palpitations. Respiratory: Negative for cough and shortness of breath. Hematologic/Lymphatic: Negative for bleeding problem. Skin: Negative for rash. Musculoskeletal: Negative for myalgias and neck pain. Gastrointestinal: Negative for abdominal pain and nausea. Neurological: Negative for light-headedness and weakness. Psychiatric/Behavioral: Negative for depression. Past Medical History:   Diagnosis Date   • High blood pressure    • Hypercholesteremia    • Hyperlipidemia    • Hypertension    • Prostatitis        Past Surgical History:   Procedure Laterality Date   • APPENDECTOMY     • CORONARY ANGIOPLASTY WITH STENT PLACEMENT  04/2013    LAD   • NASAL SEPTUM SURGERY     • NC COLONOSCOPY FLX DX W/COLLJ SPEC WHEN PFRMD N/A 12/9/2016    Procedure: EGD AND COLONOSCOPY;  Surgeon: Becky Will MD;  Location: [facility];   Service: Gastroenterology   • SEPTOPLASTY         Social History     Socioeconomic History   • Marital status: /Civil Union     Spouse name: Not on file   • Number of children: 2   • Years of education: Not on file   • Highest education level: Not on file   Occupational History   • Not on file   Tobacco Use   • Smoking status: Never   • Smokeless tobacco: Never   • Tobacco comments:     second hand smoke exposure (parents)   Vaping Use   • Vaping Use: Never used   Substance and Sexual Activity   • Alcohol use: Yes     Comment: "probably every day"  7 beers/day or equivalent   • Drug use: No   • Sexual activity: Not on file   Other Topics Concern   • Not on file   Social History Narrative         Per [employer]:     Exercises 5x week    Pets/Animals: Cat     Social Determinants of Health     Financial Resource Strain: Not on file   Food Insecurity: Not on file   Transportation Needs: Not on file   Physical Activity: Not on file   Stress: Not on file   Social Connections: Not on file   Intimate Partner Violence: Not on file   Housing Stability: Not on file       Family History   Problem Relation Age of Onset   • Diverticulitis Mother    • Colon cancer Mother         smoker   • Depression Mother    • Schizophrenia Mother         Paranoid   • Hyperlipidemia Mother    • Lung cancer Father         Smoker   • Cancer Father         bladder   • Neuropathy Father    • Diabetes Father         no injections   • Hyperlipidemia Father    • No Known Problems Sister    • Hyperlipidemia Sister    • Brain cancer Brother    • Diabetes Paternal Uncle    • Lung cancer Paternal Uncle        No Known Allergies      Current Outpatient Medications:   •  amLODIPine (NORVASC) 10 mg tablet, Take 1 tablet (10 mg total) by mouth daily, Disp: 90 tablet, Rfl: 1  •  aspirin 81 MG tablet, Take 81 mg by mouth daily, Disp: , Rfl:   •  carvedilol (COREG) 12.5 mg tablet, Take 1 tablet (12.5 mg total) by mouth daily in the early morning, Disp: 180 tablet, Rfl: 0  •  carvedilol (COREG) 25 mg tablet, Take 1 tablet (25 mg total) by mouth daily at bedtime, Disp: 180 tablet, Rfl: 2  •  Diclofenac Sodium (VOLTAREN) 1 %, Apply 2 g topically 4 (four) times a day, Disp: 100 g, Rfl: 0  •  ezetimibe (ZETIA) 10 mg tablet, Take 1 tablet (10 mg total) by mouth daily, Disp: 90 tablet, Rfl: 3  •  nitroglycerin (NITROSTAT) 0.4 mg SL tablet, Place 1 tablet (0.4 mg total) under the tongue every 5 (five) minutes as needed for chest pain, Disp: 30 tablet, Rfl: 0  •  omeprazole (PriLOSEC) 40 MG capsule, Take 1 capsule (40 mg total) by mouth daily before breakfast, Disp: 90 capsule, Rfl: 1  •  rosuvastatin (CRESTOR) 40 MG tablet, Take 1 tablet (40 mg total) by mouth daily, Disp: 90 tablet, Rfl: 3    Vitals:    10/09/23 1255   BP: 128/86   BP Location: Right arm   Patient Position: Sitting   Cuff Size: Large   Pulse: 70   Weight: 96.7 kg (213 lb 3.2 oz)     Body mass index is 29.74 kg/m². PHYSICAL EXAMINATION:  Gen: Awake, Alert, NAD   Head/eyes: AT/NC, pupils equal and round, Anicteric  ENT: mmm  Neck: Supple, No elevated JVP, trachea midline  Resp: CTA bilaterally no w/r/r  CV: RRR +S1, S2, No m/r/g  Abd: Soft, NT/ND + BS  Ext: no LE edema bilaterally  Neuro: Follows commands, moves all extermities  Psych: Appropriate affect, normal mood, pleasant attitude, non-combative  Skin: warm; no rash, erythema or venous stasis changes on exposed skin    --------------------------------------------------------------------------------  TREADMILL STRESS  Results for orders placed during the hospital encounter of 08/29/23    Stress test only, exercise    Interpretation Summary  •  Resting ECG: The ECG shows normal sinus rhythm. Occasional PVCs. Low QRS voltage. •  Stress ECG: A Carlos protocol stress test was performed. The patient after exercising for 7 min and 30 sec and had a maximal HR of 151 bpm (92 % of MPHR) and 10.1 METS. The patient experienced no angina during the test. The test was stopped because the patient experienced fatigue and dyspnea. The patient reported dyspnea and fatigue during the stress test. Symptoms began during stress and ended during recovery. •  Stress ECG: No ST deviation is noted. Arrhythmias during stress: occasional PVCs. The ECG was negative for ischemia.     --------------------------------------------------------------------------------  NUCLEAR STRESS TEST: Results for orders placed during the hospital encounter of 09/18/23    NM myocardial perfusion spect (stress and/or rest)    Interpretation Summary  •  Stress ECG: The stress ECG is negative for ischemia after maximal exercise, without reproduction of symptoms.   • Perfusion: There are no perfusion defects. •  Stress Function: Left ventricular function post-stress is normal. Stress ejection fraction is 76 %. No results found for this or any previous visit.      --------------------------------------------------------------------------------  CATH:  No results found for this or any previous visit.    --------------------------------------------------------------------------------  ECHO:   No results found for this or any previous visit. No results found for this or any previous visit.    --------------------------------------------------------------------------------  HOLTER  No results found for this or any previous visit. No results found for this or any previous visit.    --------------------------------------------------------------------------------  CAROTIDS  No results found for this or any previous visit.     --------------------------------------------------------------------------------  ECGs:  No results found for this visit on 10/09/23. Lab Results   Component Value Date    WBC 5.18 08/28/2023    HGB 12.8 08/28/2023    HCT 37.7 08/28/2023    MCV 94 08/28/2023     08/28/2023      Lab Results   Component Value Date    SODIUM 136 08/28/2023    K 3.6 08/28/2023     08/28/2023    CO2 24 08/28/2023    BUN 14 08/28/2023    CREATININE 1.07 08/28/2023    GLUC 151 (H) 08/28/2023    CALCIUM 8.8 08/28/2023      Lab Results   Component Value Date    HGBA1C 5.4 03/13/2023      Lab Results   Component Value Date    CHOL 197 11/03/2015     Lab Results   Component Value Date    HDL 68 09/06/2023    HDL 56 07/21/2023    HDL 54 03/13/2023     Lab Results   Component Value Date    LDLCALC 84 09/06/2023    [facility]  07/21/2023      Comment:      Calculated LDL invalid, triglycerides >400 mg/dl  This screening LDL is a calculated result.    It does not have the accuracy of the Direct Measured LDL in the monitoring of patients with hyperlipidemia and/or statin therapy. Direct Measure LDL (FWI620) must be ordered separately in these patients. [facility]  03/13/2023      Comment:      Calculated LDL invalid, triglycerides >400 mg/dl  This screening LDL is a calculated result. It does not have the accuracy of the Direct Measured LDL in the monitoring of patients with hyperlipidemia and/or statin therapy. Direct Measure LDL (JRC627) must be ordered separately in these patients. Lab Results   Component Value Date    TRIG 289 (H) 09/06/2023    TRIG 401 (H) 07/21/2023    TRIG 578 (H) 03/13/2023     No results found for: "CHOLHDL"   No results found for: "INR", "PROTIME"     1. Atherosclerosis of native coronary artery of native heart without angina pectoris    2. Essential hypertension  -     Echo complete w/ contrast if indicated; Future; Expected date: 04/09/2024    3. Dyslipidemia  -     ezetimibe (ZETIA) 10 mg tablet; Take 1 tablet (10 mg total) by mouth daily  -     Lipid Panel with Direct LDL reflex; Future; Expected date: 04/09/2024    4. Atypical chest pain  -     Ambulatory Referral to Cardiology        IMPRESSION:    • CAD  o CP s/p CATALINO-pLAD with residual 50% D1, April 2013  o Exercise nuclear stress test appears negative for myocardial ischemia, ET 6:30, 100% MPHR, 7.7 METs, gated EF 76%, September 2023  • Hypertension  • Dyslipidemia w/ LDL 84 mg/dL, HDL 68 mg/dL,  mg/dL, September 2023    PLAN:  It was a pleasure to see Trevon Husbands in the office today for follow-up CV evaluation. He is here today due to his recent stress test and history of coronary disease. He initially thought that his chest pain syndrome that he recently experienced was similar to his prior anginal equivalent, but upon further episodes of exertion, he was no longer experiencing the discomfort. In the past, he would have it very reproducibly whenever he would walk his dog up a hill. Doing similar activity now, he no longer experiences any discomfort.   Stress test showed no evidence of underlying myocardial ischemia or infarct. He has no signs or symptoms of heart failure and clinically examines to be euvolemic. Blood pressure and heart rate are currently stable. He has been tolerating his current medications without any reported adverse effects. The patient can perform greater than 4 METS on daily basis without significant exertional symptoms. Based on his clinical presentation, I have the following recommendations:    1. To further decrease his future cardiovascular risk, would increase his lipid-lowering regimen. He is currently on rosuvastatin 40 mg daily. LDL had substantially improved with the up titration of rosuvastatin, but he is not quite to goal.  Would start ezetimibe 10 mg daily. Risks and benefits discussed. 2.  Continue current antihypertensive regimen including amlodipine, carvedilol. 3.  Recommend heart healthy diet low in sodium and carbohydrate  4. Would encourage 30 minutes a day, 5 days a week of moderate intensity activity to build cardiovascular endurance. 5.  We will check 2D echocardiogram to assess cardiac structure and function with his longstanding history of hypertension prior to our follow-up appointment in 6 months. 6.  Should he have recurrence of his chest pain, especially worsening in frequency or severity or change in quality, recommend seeking immediate medical attention/dial 911.  7.  We will follow-up with him in 6 months to review the results of a repeat lipid panel and his echocardiogram.    As always, please do not hesitate to call with any questions. Portions of the record may have been created with voice recognition software. Occasional wrong word or "sound a like" substitutions may have occurred due to the inherent limitations of voice recognition software. Read the chart carefully and recognize, using context, where substitutions have occurred.       Signed: Rachael Gay DO, [address] [facility], [facility], [facility]

## 2023-10-13 ENCOUNTER — APPOINTMENT (OUTPATIENT)
Dept: URGENT CARE | Facility: CLINIC | Age: 58
End: 2023-10-13
Payer: OTHER MISCELLANEOUS

## 2023-10-13 PROCEDURE — 99213 OFFICE O/P EST LOW 20 MIN: CPT

## 2023-10-20 ENCOUNTER — APPOINTMENT (OUTPATIENT)
Dept: URGENT CARE | Facility: CLINIC | Age: 58
End: 2023-10-20
Payer: OTHER MISCELLANEOUS

## 2023-10-20 PROCEDURE — 99213 OFFICE O/P EST LOW 20 MIN: CPT

## 2023-10-29 DIAGNOSIS — K21.9 GASTROESOPHAGEAL REFLUX DISEASE WITHOUT ESOPHAGITIS: ICD-10-CM

## 2023-10-29 RX ORDER — OMEPRAZOLE 40 MG/1
40 CAPSULE, DELAYED RELEASE ORAL
Qty: 90 CAPSULE | Refills: 0 | Status: SHIPPED | OUTPATIENT
Start: 2023-10-29

## 2023-11-12 DIAGNOSIS — I10 ESSENTIAL HYPERTENSION: ICD-10-CM

## 2023-11-12 RX ORDER — AMLODIPINE BESYLATE 10 MG/1
10 TABLET ORAL DAILY
Qty: 90 TABLET | Refills: 0 | Status: SHIPPED | OUTPATIENT
Start: 2023-11-12

## 2023-11-28 ENCOUNTER — OFFICE VISIT (OUTPATIENT)
Dept: FAMILY MEDICINE CLINIC | Facility: CLINIC | Age: 58
End: 2023-11-28
Payer: COMMERCIAL

## 2023-11-28 VITALS
RESPIRATION RATE: 20 BRPM | BODY MASS INDEX: 30.1 KG/M2 | OXYGEN SATURATION: 99 % | DIASTOLIC BLOOD PRESSURE: 80 MMHG | HEIGHT: 71 IN | WEIGHT: 215 LBS | HEART RATE: 90 BPM | SYSTOLIC BLOOD PRESSURE: 132 MMHG

## 2023-11-28 DIAGNOSIS — H01.004 BLEPHARITIS OF LEFT UPPER EYELID, UNSPECIFIED TYPE: Primary | ICD-10-CM

## 2023-11-28 DIAGNOSIS — H00.014 HORDEOLUM EXTERNUM OF LEFT UPPER EYELID: ICD-10-CM

## 2023-11-28 PROCEDURE — 99212 OFFICE O/P EST SF 10 MIN: CPT | Performed by: FAMILY MEDICINE

## 2023-11-28 RX ORDER — TOBRAMYCIN 3 MG/ML
2 SOLUTION/ DROPS OPHTHALMIC 4 TIMES DAILY
Qty: 10 ML | Refills: 0 | Status: SHIPPED | OUTPATIENT
Start: 2023-11-28 | End: 2023-12-03

## 2023-11-28 NOTE — PATIENT INSTRUCTIONS
Has blepharitis/inflammation left upper eyelid with stye, no drainage. Can use tobramycin, prefers drops, use every 3-4 hours today then 4 times daily for at least 5 days.   Call if worsens    Otherwise treatment as discussed with Dr Mingo Shin, also sees cardiology

## 2023-11-28 NOTE — PROGRESS NOTES
FAMILY PRACTICE OFFICE VISIT  Franko Radford D.O. [address] [facility]  [address] [place] [address], [facility]      NAME: Guillaume Sinclair  AGE: 62 y.o. SEX: male  : 1965   MRN: [id number]    DATE: 2023  TIME: 9:10 AM    Assessment and Plan     Problem List Items Addressed This Visit    None  Visit Diagnoses       Blepharitis of left upper eyelid, unspecified type    -  Primary    Relevant Medications    tobramycin (TOBREX) 0.3 % SOLN    Hordeolum externum of left upper eyelid        Relevant Medications    tobramycin (TOBREX) 0.3 % SOLN            Patient Instructions   Has blepharitis/inflammation left upper eyelid with stye, no drainage. Can use tobramycin, prefers drops, use every 3-4 hours today then 4 times daily for at least 5 days. Call if worsens    Otherwise treatment as discussed with Dr Zeferino Hoff, also sees cardiology    Chief Complaint     Chief Complaint   Patient presents with    Eye Drainage     Pt left eye pain since . History of Present Illness   Guillaume Sinclair is a 62y.o.-year-old male who noted redness w swelling left upper lid -  no crusting, no itch. No contact use    Otherwise things are good. No cold sx    No eye hx      Review of Systems   Review of Systems   Constitutional:  Negative for chills and fever. Respiratory:  Negative for chest tightness, shortness of breath and wheezing. Cardiovascular:  Negative for chest pain. Gastrointestinal:  Negative for abdominal pain.        Active Problem List     Patient Active Problem List   Diagnosis    LEO positive    Atherosclerotic heart disease of native coronary artery without angina pectoris    Biceps rupture, proximal    Cluster headache    Elevated LFTs    Angelica's syndrome    Hiatal hernia    Hyperglycemia    Mixed hyperlipidemia    Essential hypertension    Elevated sed rate    Diverticulosis    Plantar fasciitis    CMC arthritis    Arthritis pain of hand    Lower abdominal pain    Dermatitis    Epigastric pain    Fatty liver, alcoholic    Tubular adenoma of colon    Gastroesophageal reflux disease without esophagitis    Lip lesion    Polyarthralgia    Diffuse abdominal pain    PSA elevation    Hyponatremia    Chest pain    Annual physical exam       Past Medical History:  Reviewed    Past Surgical History:  Reviewed    Family History:  Reviewed    Social History:  Reviewed    Objective     Vitals:    11/28/23 0856   BP: 132/80   BP Location: Right arm   Patient Position: Sitting   Cuff Size: Large   Pulse: 90   Resp: 20   SpO2: 99%   Weight: 97.5 kg (215 lb)   Height: 5' 11" (1.803 m)     Body mass index is 29.99 kg/m². BP Readings from Last 3 Encounters:   11/28/23 132/80   10/09/23 128/86   10/06/23 134/80       Wt Readings from Last 3 Encounters:   11/28/23 97.5 kg (215 lb)   10/09/23 96.7 kg (213 lb 3.2 oz)   10/06/23 97.6 kg (215 lb 3.2 oz)       Physical Exam  Constitutional:       Comments: Has redness with swelling left upper eyelid, mild stye medial, no true injection conjunctiva, no drainage, vision grossly intact/at baseline.   Right eye unremarkable         ALLERGIES:  No Known Allergies    Current Medications     Current Outpatient Medications   Medication Sig Dispense Refill    amLODIPine (NORVASC) 10 mg tablet TAKE ONE TABLET BY MOUTH EVERY DAY 90 tablet 0    aspirin 81 MG tablet Take 81 mg by mouth daily      carvedilol (COREG) 12.5 mg tablet Take 1 tablet (12.5 mg total) by mouth daily in the early morning 180 tablet 0    carvedilol (COREG) 25 mg tablet Take 1 tablet (25 mg total) by mouth daily at bedtime 180 tablet 2    Diclofenac Sodium (VOLTAREN) 1 % Apply 2 g topically 4 (four) times a day 100 g 0    ezetimibe (ZETIA) 10 mg tablet Take 1 tablet (10 mg total) by mouth daily 90 tablet 3    nitroglycerin (NITROSTAT) 0.4 mg SL tablet Place 1 tablet (0.4 mg total) under the tongue every 5 (five) minutes as needed for chest pain 30 tablet 0 omeprazole (PriLOSEC) 40 MG capsule TAKE ONE CAPSULE BY MOUTH daily before breakfast 90 capsule 0    rosuvastatin (CRESTOR) 40 MG tablet Take 1 tablet (40 mg total) by mouth daily 90 tablet 3    tobramycin (TOBREX) 0.3 % SOLN Administer 2 drops into the left eye 4 (four) times a day for 5 days 10 mL 0     No current facility-administered medications for this visit. No orders of the defined types were placed in this encounter.         Maribel Larkin, DO

## 2024-03-07 DIAGNOSIS — I10 ESSENTIAL HYPERTENSION: ICD-10-CM

## 2024-03-07 RX ORDER — AMLODIPINE BESYLATE 10 MG/1
10 TABLET ORAL DAILY
Qty: 90 TABLET | Refills: 1 | Status: SHIPPED | OUTPATIENT
Start: 2024-03-07

## 2024-05-29 ENCOUNTER — TELEPHONE (OUTPATIENT)
Age: 59
End: 2024-05-29

## 2024-05-29 DIAGNOSIS — R07.89 ATYPICAL CHEST PAIN: ICD-10-CM

## 2024-05-29 RX ORDER — NITROGLYCERIN 0.4 MG/1
0.4 TABLET SUBLINGUAL
Qty: 30 TABLET | Refills: 0 | Status: CANCELLED | OUTPATIENT
Start: 2024-05-29

## 2024-05-29 NOTE — TELEPHONE ENCOUNTER
Pt took nitroglycerin pills in his lunch pale to work and the bottle opened and the condensation in the lunch pale dissolved the tablets. He needs new pills. These were originally rxd by the er doc, can we take over and fill for pt? He cannot be without these pills. Please advise.

## 2024-05-29 NOTE — TELEPHONE ENCOUNTER
Pt at hospital on 8.28.23 you saw him for the follow up on 9.6.23 his last OV with you was for a physical on 10.6.23 no future appt scheduled with you.

## 2024-05-30 ENCOUNTER — TELEPHONE (OUTPATIENT)
Age: 59
End: 2024-05-30

## 2024-05-30 NOTE — TELEPHONE ENCOUNTER
Pts wife called back regarding the Nitroglycerin pills that her  needs. He has none for today and she is concerned because he has a stent and heart problems and needs those pills. No openings today for appt. Please advise [phone] thank you.

## 2024-05-31 DIAGNOSIS — I25.10 CORONARY ARTERY DISEASE INVOLVING NATIVE HEART WITHOUT ANGINA PECTORIS, UNSPECIFIED VESSEL OR LESION TYPE: Primary | ICD-10-CM

## 2024-05-31 RX ORDER — NITROGLYCERIN 0.4 MG/1
0.4 TABLET SUBLINGUAL
Qty: 25 TABLET | Refills: 1 | Status: SHIPPED | OUTPATIENT
Start: 2024-05-31

## 2024-05-31 RX ORDER — NITROGLYCERIN 0.4 MG/1
0.4 TABLET SUBLINGUAL
Qty: 10 TABLET | Refills: 1 | Status: SHIPPED | OUTPATIENT
Start: 2024-05-31 | End: 2024-05-31 | Stop reason: SDUPTHER

## 2024-05-31 NOTE — TELEPHONE ENCOUNTER
Caller: Katharina ([facility])    Reason for call: Pharmacy received prescription for a 10 tab bottle, but the smallest units that can be dispensed is 25 tabs.     Please call to verify that this is ok.     Call back#: [phone]

## 2024-06-14 DIAGNOSIS — K21.9 GASTROESOPHAGEAL REFLUX DISEASE WITHOUT ESOPHAGITIS: ICD-10-CM

## 2024-06-14 RX ORDER — OMEPRAZOLE 40 MG/1
40 CAPSULE, DELAYED RELEASE ORAL
Qty: 90 CAPSULE | Refills: 1 | Status: SHIPPED | OUTPATIENT
Start: 2024-06-14

## 2024-07-03 DIAGNOSIS — I10 ESSENTIAL HYPERTENSION: ICD-10-CM

## 2024-07-03 RX ORDER — CARVEDILOL 25 MG/1
25 TABLET ORAL 2 TIMES DAILY WITH MEALS
Qty: 200 TABLET | Refills: 1 | Status: SHIPPED | OUTPATIENT
Start: 2024-07-03

## 2024-08-14 DIAGNOSIS — E78.5 HYPERLIPIDEMIA, UNSPECIFIED HYPERLIPIDEMIA TYPE: ICD-10-CM

## 2024-08-14 RX ORDER — ROSUVASTATIN CALCIUM 40 MG/1
40 TABLET, COATED ORAL DAILY
Qty: 90 TABLET | Refills: 0 | Status: SHIPPED | OUTPATIENT
Start: 2024-08-14

## 2024-09-17 DIAGNOSIS — I10 ESSENTIAL HYPERTENSION: ICD-10-CM

## 2024-09-17 NOTE — TELEPHONE ENCOUNTER
Patient called to request a refill for their amlodipine advised a refill was requested on 09/17/2024 and is pending approval. Patient verbalized understanding and is in agreement.

## 2024-09-18 RX ORDER — AMLODIPINE BESYLATE 10 MG/1
10 TABLET ORAL DAILY
Qty: 90 TABLET | Refills: 1 | Status: SHIPPED | OUTPATIENT
Start: 2024-09-18

## 2024-10-24 DIAGNOSIS — E78.5 DYSLIPIDEMIA: ICD-10-CM

## 2024-10-25 RX ORDER — EZETIMIBE 10 MG/1
10 TABLET ORAL DAILY
Qty: 90 TABLET | Refills: 3 | Status: SHIPPED | OUTPATIENT
Start: 2024-10-25

## 2024-11-20 DIAGNOSIS — E78.5 HYPERLIPIDEMIA, UNSPECIFIED HYPERLIPIDEMIA TYPE: ICD-10-CM

## 2024-11-21 RX ORDER — ROSUVASTATIN CALCIUM 40 MG/1
40 TABLET, COATED ORAL DAILY
Qty: 90 TABLET | Refills: 0 | OUTPATIENT
Start: 2024-11-21

## 2024-11-22 DIAGNOSIS — E78.5 HYPERLIPIDEMIA, UNSPECIFIED HYPERLIPIDEMIA TYPE: ICD-10-CM

## 2024-11-25 NOTE — TELEPHONE ENCOUNTER
11/25 3:52pm: LVM for PT to call back to schedule appt for Annual Physical so that we can continue to refill his medications. Appt can also be made via [employer]

## 2025-01-07 DIAGNOSIS — E78.5 HYPERLIPIDEMIA, UNSPECIFIED HYPERLIPIDEMIA TYPE: ICD-10-CM

## 2025-01-07 DIAGNOSIS — I10 ESSENTIAL HYPERTENSION: ICD-10-CM

## 2025-01-07 DIAGNOSIS — E78.5 DYSLIPIDEMIA: ICD-10-CM

## 2025-01-07 DIAGNOSIS — K21.9 GASTROESOPHAGEAL REFLUX DISEASE WITHOUT ESOPHAGITIS: ICD-10-CM

## 2025-01-07 RX ORDER — ROSUVASTATIN CALCIUM 40 MG/1
40 TABLET, COATED ORAL DAILY
Qty: 90 TABLET | Refills: 0 | OUTPATIENT
Start: 2025-01-07

## 2025-01-07 NOTE — TELEPHONE ENCOUNTER
Medication: amLODIPine (NORVASC) 10 mg tablet     Dose/Frequency: TAKE ONE TABLET BY MOUTH EVERY DAY     Quantity: 90 tablet     Pharmacy: [facility] [address] [place] [address]    Office:   [x] PCP/Provider - Arnulfo Spann MD   [] Speciality/Provider -     Does the patient have enough for 3 days? Completely Out  [] Yes   [x] No - Send as HP to POD       Medication: carvedilol (COREG) 25 mg tablet     Dose/Frequency:   TAKE ONE TABLET BY MOUTH TWICE DAILY WITH MEALS        Quantity: 200 tablet     Pharmacy: [facility] [address] [place] [address]    Office:   [x] PCP/Provider - Arnulfo Spann MD   [] Speciality/Provider -     Does the patient have enough for 3 days? Completely Out  [] Yes   [x] No - Send as HP to POD      Medication: ezetimibe (ZETIA) 10 mg tablet     Dose/Frequency: TAKE ONE TABLET BY MOUTH EVERY DAY     Quantity: 90 tablet     Pharmacy: [facility] [address] [place] [address]    Office:   [x] PCP/Provider - Arnulfo Spann MD   [] Speciality/Provider -     Does the patient have enough for 3 days? Completely Out  [] Yes   [x] No - Send as HP to POD      Medication: omeprazole (PriLOSEC) 40 MG capsule     Dose/Frequency:   TAKE ONE CAPSULE BY MOUTH daily before breakfast      Quantity: 90 capsule     Pharmacy: [facility] [place] [address]    Office:   [x] PCP/Provider - Arnulfo Spann MD   [] Speciality/Provider -     Does the patient have enough for 3 days? Completely Out  [] Yes   [x] No - Send as HP to POD      Medication: rosuvastatin (CRESTOR) 40 MG tablet     Dose/Frequency:   TAKE ONE TABLET BY MOUTH daily    Quantity:  90 tablet     Pharmacy: [facility] [address] [place] [address]    Office:   [x] PCP/Provider - Arnulfo Spann MD   [] Speciality/Provider -     Does the patient have  enough for 3 days? Completely Out  [] Yes   [x] No - Send as HP to POD

## 2025-01-08 RX ORDER — CARVEDILOL 25 MG/1
25 TABLET ORAL 2 TIMES DAILY WITH MEALS
Qty: 180 TABLET | Refills: 0 | Status: SHIPPED | OUTPATIENT
Start: 2025-01-08

## 2025-01-08 RX ORDER — ROSUVASTATIN CALCIUM 40 MG/1
40 TABLET, COATED ORAL DAILY
Qty: 90 TABLET | Refills: 0 | Status: SHIPPED | OUTPATIENT
Start: 2025-01-08

## 2025-01-08 RX ORDER — OMEPRAZOLE 40 MG/1
40 CAPSULE, DELAYED RELEASE ORAL
Qty: 90 CAPSULE | Refills: 0 | Status: SHIPPED | OUTPATIENT
Start: 2025-01-08

## 2025-01-08 RX ORDER — EZETIMIBE 10 MG/1
10 TABLET ORAL DAILY
Qty: 90 TABLET | Refills: 0 | Status: SHIPPED | OUTPATIENT
Start: 2025-01-08

## 2025-01-08 RX ORDER — AMLODIPINE BESYLATE 10 MG/1
10 TABLET ORAL DAILY
Qty: 90 TABLET | Refills: 0 | Status: SHIPPED | OUTPATIENT
Start: 2025-01-08

## 2025-02-04 ENCOUNTER — OFFICE VISIT (OUTPATIENT)
Dept: FAMILY MEDICINE CLINIC | Facility: CLINIC | Age: 60
End: 2025-02-04
Payer: COMMERCIAL

## 2025-02-04 VITALS
BODY MASS INDEX: 29.4 KG/M2 | OXYGEN SATURATION: 99 % | HEART RATE: 76 BPM | SYSTOLIC BLOOD PRESSURE: 130 MMHG | TEMPERATURE: 98.5 F | DIASTOLIC BLOOD PRESSURE: 80 MMHG | HEIGHT: 71 IN | WEIGHT: 210 LBS

## 2025-02-04 DIAGNOSIS — I10 ESSENTIAL HYPERTENSION: ICD-10-CM

## 2025-02-04 DIAGNOSIS — E78.5 DYSLIPIDEMIA: ICD-10-CM

## 2025-02-04 DIAGNOSIS — Z13.0 SCREENING FOR DEFICIENCY ANEMIA: ICD-10-CM

## 2025-02-04 DIAGNOSIS — M54.50 CHRONIC MIDLINE LOW BACK PAIN WITHOUT SCIATICA: ICD-10-CM

## 2025-02-04 DIAGNOSIS — G89.29 CHRONIC MIDLINE LOW BACK PAIN WITHOUT SCIATICA: ICD-10-CM

## 2025-02-04 DIAGNOSIS — G62.9 PERIPHERAL POLYNEUROPATHY: ICD-10-CM

## 2025-02-04 DIAGNOSIS — R73.03 PREDIABETES: ICD-10-CM

## 2025-02-04 DIAGNOSIS — K21.9 GASTROESOPHAGEAL REFLUX DISEASE WITHOUT ESOPHAGITIS: ICD-10-CM

## 2025-02-04 DIAGNOSIS — E78.5 HYPERLIPIDEMIA, UNSPECIFIED HYPERLIPIDEMIA TYPE: ICD-10-CM

## 2025-02-04 DIAGNOSIS — Z13.89 SCREENING FOR BLOOD OR PROTEIN IN URINE: ICD-10-CM

## 2025-02-04 DIAGNOSIS — E78.2 MIXED HYPERLIPIDEMIA: ICD-10-CM

## 2025-02-04 DIAGNOSIS — Z12.5 SCREENING FOR PROSTATE CANCER: ICD-10-CM

## 2025-02-04 DIAGNOSIS — Z13.29 SCREENING FOR HYPOTHYROIDISM: ICD-10-CM

## 2025-02-04 DIAGNOSIS — Z00.00 ANNUAL PHYSICAL EXAM: Primary | ICD-10-CM

## 2025-02-04 PROBLEM — R97.20 PSA ELEVATION: Status: RESOLVED | Noted: 2023-07-19 | Resolved: 2025-02-04

## 2025-02-04 PROCEDURE — 99214 OFFICE O/P EST MOD 30 MIN: CPT | Performed by: INTERNAL MEDICINE

## 2025-02-04 PROCEDURE — 99396 PREV VISIT EST AGE 40-64: CPT | Performed by: INTERNAL MEDICINE

## 2025-02-04 RX ORDER — ROSUVASTATIN CALCIUM 40 MG/1
40 TABLET, COATED ORAL DAILY
Qty: 90 TABLET | Refills: 0 | Status: SHIPPED | OUTPATIENT
Start: 2025-02-04

## 2025-02-04 RX ORDER — OMEPRAZOLE 40 MG/1
40 CAPSULE, DELAYED RELEASE ORAL
Qty: 90 CAPSULE | Refills: 0 | Status: SHIPPED | OUTPATIENT
Start: 2025-02-04

## 2025-02-04 RX ORDER — AMLODIPINE BESYLATE 10 MG/1
10 TABLET ORAL DAILY
Qty: 90 TABLET | Refills: 0 | Status: SHIPPED | OUTPATIENT
Start: 2025-02-04

## 2025-02-04 RX ORDER — EZETIMIBE 10 MG/1
10 TABLET ORAL DAILY
Qty: 90 TABLET | Refills: 0 | Status: SHIPPED | OUTPATIENT
Start: 2025-02-04

## 2025-02-04 RX ORDER — CARVEDILOL 25 MG/1
25 TABLET ORAL 2 TIMES DAILY WITH MEALS
Qty: 180 TABLET | Refills: 0 | Status: SHIPPED | OUTPATIENT
Start: 2025-02-04

## 2025-02-04 NOTE — ASSESSMENT & PLAN NOTE
Will recheck his kidney function and electrolytes.  Will consider starting him on Celebrex.  He would like to hold off on physical therapy.

## 2025-02-04 NOTE — ASSESSMENT & PLAN NOTE
Blood pressures are acceptable, will recheck kidney function and electrolytes.  Continue carvedilol and amlodipine.  Orders:    Comprehensive metabolic panel; Future    amLODIPine (NORVASC) 10 mg tablet; Take 1 tablet (10 mg total) by mouth daily    carvedilol (COREG) 25 mg tablet; Take 1 tablet (25 mg total) by mouth 2 (two) times a day with meals

## 2025-02-04 NOTE — PROGRESS NOTES
Name: Anjum Wong      : 1965      MRN: [id number]  Encounter Provider: Arnulfo Spann MD  Encounter Date: 2025   Encounter department: [facility]    Assessment & Plan  Essential hypertension  Blood pressures are acceptable, will recheck kidney function and electrolytes.  Continue carvedilol and amlodipine.  Orders:    Comprehensive metabolic panel; Future    amLODIPine (NORVASC) 10 mg tablet; Take 1 tablet (10 mg total) by mouth daily    carvedilol (COREG) 25 mg tablet; Take 1 tablet (25 mg total) by mouth 2 (two) times a day with meals    Screening for prostate cancer    Orders:    PSA, Total Screen; Future    Mixed hyperlipidemia  Check lipid panel, continue rosuvastatin and Zetia.  Orders:    Lipid panel; Future    Screening for hypothyroidism    Orders:    TSH, 3rd generation with Free T4 reflex; Future    Screening for deficiency anemia    Orders:    CBC and differential; Future    Screening for blood or protein in urine    Orders:    UA (URINE) with reflex to Scope; Future    Prediabetes    Orders:    Hemoglobin A1C; Future    Dyslipidemia    Orders:    ezetimibe (ZETIA) 10 mg tablet; Take 1 tablet (10 mg total) by mouth daily    Gastroesophageal reflux disease without esophagitis    Orders:    omeprazole (PriLOSEC) 40 MG capsule; Take 1 capsule (40 mg total) by mouth daily before breakfast    Hyperlipidemia, unspecified hyperlipidemia type    Orders:    rosuvastatin (CRESTOR) 40 MG tablet; Take 1 tablet (40 mg total) by mouth daily    Peripheral polyneuropathy  Patient reports episodes of numbness sensation in his lower extremities, will check TSH, electrolytes and B12.  Orders:    Vitamin B12; Future    Annual physical exam         Chronic midline low back pain without sciatica  Will recheck his kidney function and electrolytes.  Will consider starting him on Celebrex.  He would like to hold off on physical therapy.            History of Present  Illness     Patient came today for annual checkup and to follow-up on his chronic medical problems.  Vital signs are very acceptable.  He would like to hold off on his Shingrix as for now.  He does not smoke, drinks alcohol very occasionally.  Up-to-date on colonoscopy, agreed for PSA.        Review of Systems   Constitutional:  Negative for chills, fatigue and fever.   Respiratory:  Negative for cough, chest tightness and shortness of breath.    Cardiovascular:  Negative for chest pain, palpitations and leg swelling.   Gastrointestinal:  Negative for abdominal distention, abdominal pain, blood in stool, constipation, diarrhea and nausea.   Genitourinary:  Negative for difficulty urinating and dysuria.   Musculoskeletal:  Positive for arthralgias and back pain. Negative for gait problem, joint swelling, myalgias and neck pain.   Skin:  Negative for rash.   Neurological:  Negative for dizziness, weakness, numbness and headaches.   Psychiatric/Behavioral:  Negative for agitation.      Past Medical History:   Diagnosis Date    High blood pressure     Hypercholesteremia     Hyperlipidemia     Hypertension     Prostatitis      Past Surgical History:   Procedure Laterality Date    APPENDECTOMY      CORONARY ANGIOPLASTY WITH STENT PLACEMENT  04/2013    LAD    NASAL SEPTUM SURGERY      CA COLONOSCOPY FLX DX W/COLLJ SPEC WHEN PFRMD N/A 12/9/2016    Procedure: EGD AND COLONOSCOPY;  Surgeon: Jimbo Romero MD;  Location: [facility];  Service: Gastroenterology    SEPTOPLASTY       Family History   Problem Relation Age of Onset    Diverticulitis Mother     Colon cancer Mother         smoker    Depression Mother     Schizophrenia Mother         Paranoid    Hyperlipidemia Mother     Lung cancer Father         Smoker    Cancer Father         bladder    Neuropathy Father     Diabetes Father         no injections    Hyperlipidemia Father     No Known Problems Sister     Hyperlipidemia Sister     Brain cancer Brother     Diabetes  "Paternal Uncle     Lung cancer Paternal Uncle      Social History     Tobacco Use    Smoking status: Never    Smokeless tobacco: Never    Tobacco comments:     second hand smoke exposure (parents)   Vaping Use    Vaping status: Never Used   Substance and Sexual Activity    Alcohol use: Yes     Comment: \"probably every day\"  7 beers/day or equivalent    Drug use: No    Sexual activity: Not on file     Current Outpatient Medications on File Prior to Visit   Medication Sig    Diclofenac Sodium (VOLTAREN) 1 % Apply 2 g topically 4 (four) times a day    nitroglycerin (NITROSTAT) 0.4 mg SL tablet Place 1 tablet (0.4 mg total) under the tongue every 5 (five) minutes as needed for chest pain    [DISCONTINUED] amLODIPine (NORVASC) 10 mg tablet Take 1 tablet (10 mg total) by mouth daily    [DISCONTINUED] carvedilol (COREG) 25 mg tablet Take 1 tablet (25 mg total) by mouth 2 (two) times a day with meals    [DISCONTINUED] ezetimibe (ZETIA) 10 mg tablet Take 1 tablet (10 mg total) by mouth daily    [DISCONTINUED] omeprazole (PriLOSEC) 40 MG capsule Take 1 capsule (40 mg total) by mouth daily before breakfast    [DISCONTINUED] rosuvastatin (CRESTOR) 40 MG tablet Take 1 tablet (40 mg total) by mouth daily    aspirin 81 MG tablet Take 81 mg by mouth daily (Patient not taking: Reported on 2/4/2025)     No Known Allergies  Immunization History   Administered Date(s) Administered    Tdap 10/06/2023, 10/06/2023     Objective   /80   Pulse 76   Temp 98.5 °F (36.9 °C)   Ht 5' 11\" (1.803 m)   Wt 95.3 kg (210 lb)   SpO2 99%   BMI 29.29 kg/m²     Physical Exam  Constitutional:       General: He is not in acute distress.     Appearance: He is not ill-appearing or toxic-appearing.   Cardiovascular:      Heart sounds: No murmur heard.     No gallop.   Pulmonary:      Effort: No respiratory distress.      Breath sounds: No wheezing or rales.   Abdominal:      General: There is no distension.      Tenderness: There is no abdominal " tenderness. There is no guarding.   Musculoskeletal:         General: Tenderness present.   Psychiatric:         Mood and Affect: Mood normal.

## 2025-02-10 ENCOUNTER — RESULTS FOLLOW-UP (OUTPATIENT)
Dept: FAMILY MEDICINE CLINIC | Facility: CLINIC | Age: 60
End: 2025-02-10

## 2025-02-10 DIAGNOSIS — E87.6 HYPOKALEMIA: Primary | ICD-10-CM

## 2025-02-10 RX ORDER — POTASSIUM CHLORIDE 1500 MG/1
20 TABLET, EXTENDED RELEASE ORAL 2 TIMES DAILY
Qty: 60 TABLET | Refills: 0 | Status: SHIPPED | OUTPATIENT
Start: 2025-02-10

## 2025-03-11 DIAGNOSIS — E87.6 HYPOKALEMIA: ICD-10-CM

## 2025-03-11 RX ORDER — POTASSIUM CHLORIDE 1500 MG/1
20 TABLET, EXTENDED RELEASE ORAL 2 TIMES DAILY
Qty: 60 TABLET | Refills: 5 | Status: SHIPPED | OUTPATIENT
Start: 2025-03-11

## 2025-05-01 DIAGNOSIS — E78.5 DYSLIPIDEMIA: ICD-10-CM

## 2025-05-01 DIAGNOSIS — E78.5 HYPERLIPIDEMIA, UNSPECIFIED HYPERLIPIDEMIA TYPE: ICD-10-CM

## 2025-05-01 DIAGNOSIS — I10 ESSENTIAL HYPERTENSION: ICD-10-CM

## 2025-05-01 RX ORDER — ROSUVASTATIN CALCIUM 40 MG/1
40 TABLET, COATED ORAL DAILY
Qty: 90 TABLET | Refills: 1 | Status: SHIPPED | OUTPATIENT
Start: 2025-05-01

## 2025-05-01 RX ORDER — EZETIMIBE 10 MG/1
10 TABLET ORAL DAILY
Qty: 90 TABLET | Refills: 1 | Status: SHIPPED | OUTPATIENT
Start: 2025-05-01

## 2025-05-01 RX ORDER — AMLODIPINE BESYLATE 10 MG/1
10 TABLET ORAL DAILY
Qty: 90 TABLET | Refills: 1 | Status: SHIPPED | OUTPATIENT
Start: 2025-05-01

## 2025-08-06 DIAGNOSIS — K21.9 GASTROESOPHAGEAL REFLUX DISEASE WITHOUT ESOPHAGITIS: ICD-10-CM

## 2025-08-08 RX ORDER — OMEPRAZOLE 40 MG/1
40 CAPSULE, DELAYED RELEASE ORAL
Qty: 90 CAPSULE | Refills: 1 | Status: SHIPPED | OUTPATIENT
Start: 2025-08-08